# Patient Record
Sex: FEMALE | Race: WHITE | NOT HISPANIC OR LATINO | Employment: OTHER | URBAN - METROPOLITAN AREA
[De-identification: names, ages, dates, MRNs, and addresses within clinical notes are randomized per-mention and may not be internally consistent; named-entity substitution may affect disease eponyms.]

---

## 2017-10-17 ENCOUNTER — TRANSCRIBE ORDERS (OUTPATIENT)
Dept: ADMINISTRATIVE | Facility: HOSPITAL | Age: 62
End: 2017-10-17

## 2017-10-17 DIAGNOSIS — Z12.31 VISIT FOR SCREENING MAMMOGRAM: Primary | ICD-10-CM

## 2017-10-18 ENCOUNTER — HOSPITAL ENCOUNTER (OUTPATIENT)
Dept: MAMMOGRAPHY | Facility: HOSPITAL | Age: 62
Discharge: HOME | End: 2017-10-18
Attending: OBSTETRICS & GYNECOLOGY | Admitting: OBSTETRICS & GYNECOLOGY

## 2017-10-18 DIAGNOSIS — Z12.31 VISIT FOR SCREENING MAMMOGRAM: ICD-10-CM

## 2017-10-18 PROCEDURE — G0202 SCR MAMMO BI INCL CAD: HCPCS

## 2017-10-18 PROCEDURE — 77063 BREAST TOMOSYNTHESIS BI: CPT

## 2017-10-19 PROCEDURE — 77063 BREAST TOMOSYNTHESIS BI: CPT | Performed by: RADIOLOGY

## 2017-10-19 PROCEDURE — 77067 SCR MAMMO BI INCL CAD: CPT | Performed by: RADIOLOGY

## 2017-10-24 ENCOUNTER — HOSPITAL ENCOUNTER (OUTPATIENT)
Dept: MAMMOGRAPHY | Facility: HOSPITAL | Age: 62
Discharge: HOME | End: 2017-10-24

## 2017-10-24 DIAGNOSIS — Z12.31 VISIT FOR SCREENING MAMMOGRAM: ICD-10-CM

## 2017-10-24 PROCEDURE — G0202 SCR MAMMO BI INCL CAD: HCPCS

## 2018-11-15 ENCOUNTER — OFFICE VISIT (OUTPATIENT)
Dept: FAMILY MEDICINE CLINIC | Facility: CLINIC | Age: 63
End: 2018-11-15

## 2018-11-15 VITALS
DIASTOLIC BLOOD PRESSURE: 66 MMHG | WEIGHT: 217 LBS | HEIGHT: 64 IN | SYSTOLIC BLOOD PRESSURE: 138 MMHG | HEART RATE: 82 BPM | TEMPERATURE: 98.2 F | OXYGEN SATURATION: 99 % | BODY MASS INDEX: 37.05 KG/M2

## 2018-11-15 DIAGNOSIS — Z12.11 COLON CANCER SCREENING: ICD-10-CM

## 2018-11-15 DIAGNOSIS — Z00.00 GENERAL MEDICAL EXAM: Primary | ICD-10-CM

## 2018-11-15 DIAGNOSIS — F41.9 ANXIETY: ICD-10-CM

## 2018-11-15 DIAGNOSIS — E61.1 IRON DEFICIENCY: ICD-10-CM

## 2018-11-15 DIAGNOSIS — D50.9 IRON DEFICIENCY ANEMIA, UNSPECIFIED IRON DEFICIENCY ANEMIA TYPE: ICD-10-CM

## 2018-11-15 DIAGNOSIS — E53.8 B12 DEFICIENCY: ICD-10-CM

## 2018-11-15 DIAGNOSIS — J30.9 ALLERGIC RHINITIS, UNSPECIFIED SEASONALITY, UNSPECIFIED TRIGGER: ICD-10-CM

## 2018-11-15 DIAGNOSIS — I10 ESSENTIAL HYPERTENSION: ICD-10-CM

## 2018-11-15 DIAGNOSIS — E03.9 HYPOTHYROIDISM, UNSPECIFIED TYPE: ICD-10-CM

## 2018-11-15 PROCEDURE — 99386 PREV VISIT NEW AGE 40-64: CPT | Performed by: PHYSICIAN ASSISTANT

## 2018-11-15 RX ORDER — METOPROLOL SUCCINATE 50 MG/1
TABLET, EXTENDED RELEASE ORAL
COMMUNITY
End: 2018-11-15 | Stop reason: SDUPTHER

## 2018-11-15 RX ORDER — LORATADINE 10 MG/1
10 TABLET ORAL DAILY
Qty: 30 TABLET | Refills: 11 | Status: SHIPPED | OUTPATIENT
Start: 2018-11-15 | End: 2019-11-15 | Stop reason: SDUPTHER

## 2018-11-15 RX ORDER — LEVOTHYROXINE SODIUM 112 UG/1
112 TABLET ORAL DAILY
Qty: 30 TABLET | Refills: 11 | Status: SHIPPED | OUTPATIENT
Start: 2018-11-15 | End: 2019-12-02 | Stop reason: SDUPTHER

## 2018-11-15 RX ORDER — CYANOCOBALAMIN 1000 UG/ML
INJECTION, SOLUTION INTRAMUSCULAR; SUBCUTANEOUS
Qty: 1 ML | Refills: 11 | Status: SHIPPED | OUTPATIENT
Start: 2018-11-15 | End: 2020-02-18 | Stop reason: SDUPTHER

## 2018-11-15 RX ORDER — FLUOXETINE HYDROCHLORIDE 20 MG/1
CAPSULE ORAL
COMMUNITY
Start: 2018-11-07 | End: 2019-12-02

## 2018-11-15 RX ORDER — METOPROLOL SUCCINATE 50 MG/1
50 TABLET, EXTENDED RELEASE ORAL DAILY
Qty: 30 TABLET | Refills: 5 | Status: SHIPPED | OUTPATIENT
Start: 2018-11-15 | End: 2019-07-02 | Stop reason: SDUPTHER

## 2018-11-15 RX ORDER — ESTRADIOL 0.1 MG/D
FILM, EXTENDED RELEASE TRANSDERMAL
COMMUNITY
End: 2019-12-02 | Stop reason: SDUPTHER

## 2018-11-15 RX ORDER — LEVOTHYROXINE SODIUM 112 UG/1
TABLET ORAL
COMMUNITY
End: 2018-11-15 | Stop reason: SDUPTHER

## 2018-11-15 RX ORDER — LORATADINE 10 MG/1
TABLET ORAL
COMMUNITY
End: 2018-11-15 | Stop reason: SDUPTHER

## 2018-11-15 RX ORDER — ALPRAZOLAM 0.25 MG/1
0.25 TABLET ORAL NIGHTLY PRN
COMMUNITY
End: 2021-11-15 | Stop reason: SDUPTHER

## 2018-11-15 RX ORDER — FERROUS SULFATE 325(65) MG
TABLET ORAL EVERY 12 HOURS SCHEDULED
COMMUNITY
End: 2022-03-08

## 2018-11-15 RX ORDER — CHOLECALCIFEROL (VITAMIN D3) 125 MCG
CAPSULE ORAL
COMMUNITY
End: 2019-12-02 | Stop reason: SDUPTHER

## 2018-11-15 NOTE — PROGRESS NOTES
Subjective   Jeane Martines is a 63 y.o. female  Establish Care (New patient establish care, previous PCP Zara Steward ); Anemia (concerned about anemia, req referral for colonscopy/EGD ); and Annual Exam      History of Present Illness  Patient presents today to establish care as a new patient in our practice and for a preventive medical visit.  Patient is here to determine screening labs and tests that are due and to determine immunization status as well.  Patient will be counseled regarding preventative medicine issues such as regular exercise and  healthy diet as well.    The following portions of the patient's history were reviewed and updated as appropriate: allergies, current medications, past social history and problem list    Review of Systems   Constitutional: Positive for appetite change ( Craving ice lately). Negative for activity change, chills, diaphoresis, fatigue, fever and unexpected weight change.   HENT: Negative.    Eyes: Negative.    Respiratory: Negative.    Cardiovascular: Negative.    Gastrointestinal: Negative.  Negative for anal bleeding, blood in stool and constipation.   Endocrine: Negative.    Genitourinary: Negative.  Negative for hematuria.        Status post hysterectomy with Dr. Ndiaye   Musculoskeletal: Negative.    Skin: Negative.    Allergic/Immunologic: Negative.    Neurological: Negative.  Negative for dizziness.   Hematological: Negative.    Psychiatric/Behavioral: Negative.    All other systems reviewed and are negative.      Objective     Vitals:    11/15/18 1312   BP: 138/66   Pulse: 82   Temp: 98.2 °F (36.8 °C)   SpO2: 99%       Physical Exam   Constitutional: She is oriented to person, place, and time. She appears well-developed and well-nourished.  Non-toxic appearance. She does not have a sickly appearance. She does not appear ill. No distress.   Obesity noted  Patient appears healthy, pleasant, talkative, laughing, friendly   HENT:   Head: Normocephalic and  atraumatic.   Right Ear: External ear normal.   Left Ear: External ear normal.   Nose: Nose normal.   Mouth/Throat: Oropharynx is clear and moist.   Eyes: Conjunctivae and EOM are normal. Pupils are equal, round, and reactive to light. No scleral icterus.   Neck: Normal range of motion. Neck supple. No JVD present. Carotid bruit is not present. No thyromegaly present.   Cardiovascular: Normal rate, regular rhythm, normal heart sounds and intact distal pulses.   No murmur heard.  Pulmonary/Chest: Effort normal and breath sounds normal. No respiratory distress.   Abdominal: Soft. Bowel sounds are normal. She exhibits no mass. There is no tenderness.   Musculoskeletal: Normal range of motion. She exhibits no edema.   Lymphadenopathy:     She has no cervical adenopathy.   Neurological: She is alert and oriented to person, place, and time. She has normal reflexes. She displays normal reflexes. No cranial nerve deficit or sensory deficit. She exhibits normal muscle tone. Coordination normal.   Skin: Skin is warm and dry. No rash noted. She is not diaphoretic. No erythema. No pallor.   Psychiatric: She has a normal mood and affect. Her behavior is normal. Judgment and thought content normal.   Nursing note and vitals reviewed.  Discussed preventative medicine issues with patient including regular exercise, healthy diet, stress reduction, adequate sleep and recommended age-appropriate screening studies.    Assessment/Plan     Diagnoses and all orders for this visit:    General medical exam  -     Ambulatory Referral For Screening Colonoscopy    Allergic rhinitis, unspecified seasonality, unspecified trigger    Essential hypertension    Hypothyroidism, unspecified type    B12 deficiency    Iron deficiency  -     Ambulatory referral for Screening EGD  -     Ambulatory Referral For Screening Colonoscopy    Anxiety    Colon cancer screening  -     Ambulatory Referral For Screening Colonoscopy    Iron deficiency anemia,  "unspecified iron deficiency anemia type  -     Ambulatory referral for Screening EGD  -     Ambulatory Referral For Screening Colonoscopy    Other orders  -     Discontinue: levothyroxine (SYNTHROID, LEVOTHROID) 112 MCG tablet;  Take 1 tablet every day by oral route.  -     Discontinue: metoprolol succinate XL (TOPROL-XL) 50 MG 24 hr tablet; Take 1 tablet every day by oral route for 90 days  -     Discontinue: loratadine (CLARITIN) 10 MG tablet; Take 1 tablet every day by oral route for 90 days.  -     FLUoxetine (PROzac) 20 MG capsule; ONCE DAILY  -     ALPRAZolam (XANAX) 0.25 MG tablet;  Take 1 tablet every day by oral route as needed.  -     estradiol (MINIVELLE) 0.1 MG/24HR patch;  Apply 1 patch twice a week by transderm. route  -     ferrous sulfate 325 (65 FE) MG tablet; Every 12 (Twelve) Hours. Every 12 (Twelve) Hours.  -     Cholecalciferol (VITAMIN D3) 2000 units tablet; Take  by mouth.  -     metoprolol succinate XL (TOPROL-XL) 50 MG 24 hr tablet; Take 1 tablet by mouth Daily. Take 1 tablet every day by oral route for 90 days  -     levothyroxine (SYNTHROID, LEVOTHROID) 112 MCG tablet; Take 1 tablet by mouth Daily.  Take 1 tablet every day by oral route.  -     loratadine (CLARITIN) 10 MG tablet; Take 1 tablet by mouth Daily. Take 1 tablet every day by oral route for 90 days.  -     cyanocobalamin 1000 MCG/ML injection; Administer 1 ML IM monthly for pernicious anemia.  -     Needle, Disp, (B-D DISP NEEDLE 25GX1\") 25G X 1\" misc; 1 mL Every 30 (Thirty) Days.      "

## 2018-12-03 ENCOUNTER — TELEPHONE (OUTPATIENT)
Dept: FAMILY MEDICINE CLINIC | Facility: CLINIC | Age: 63
End: 2018-12-03

## 2018-12-03 DIAGNOSIS — D64.9 ANEMIA, UNSPECIFIED TYPE: Primary | ICD-10-CM

## 2018-12-03 NOTE — TELEPHONE ENCOUNTER
Nona, can you please confirm/make sure she has been scheduled for EGD and colonoscopy for evaluation of iron deficient anemia.  I was awaiting her lab results from her previous doctor she does have a low iron count of 18 and a hemoglobin of 8.4.

## 2018-12-03 NOTE — TELEPHONE ENCOUNTER
Please call patient as her EGD showed some gastritis this may be the cause of her anemia but I want her to repeat her labs within the next week so that I can compare them to the labs that we received from her previous doctor to determine whether further studies are needed.

## 2018-12-11 ENCOUNTER — LAB (OUTPATIENT)
Dept: LAB | Facility: HOSPITAL | Age: 63
End: 2018-12-11

## 2018-12-11 DIAGNOSIS — D64.9 ANEMIA, UNSPECIFIED TYPE: ICD-10-CM

## 2018-12-11 LAB
DEPRECATED RDW RBC AUTO: 60.1 FL (ref 37–54)
ERYTHROCYTE [DISTWIDTH] IN BLOOD BY AUTOMATED COUNT: 21.1 % (ref 11.3–14.5)
FERRITIN SERPL-MCNC: 18 NG/ML (ref 10–291)
HCT VFR BLD AUTO: 40.7 % (ref 34.5–44)
HGB BLD-MCNC: 12.7 G/DL (ref 11.5–15.5)
IRON 24H UR-MRATE: 242 MCG/DL (ref 50–175)
MCH RBC QN AUTO: 24.4 PG (ref 27–31)
MCHC RBC AUTO-ENTMCNC: 31.2 G/DL (ref 32–36)
MCV RBC AUTO: 78.3 FL (ref 80–99)
PLATELET # BLD AUTO: 354 10*3/MM3 (ref 150–450)
PMV BLD AUTO: 10.8 FL (ref 6–12)
RBC # BLD AUTO: 5.2 10*6/MM3 (ref 3.89–5.14)
VIT B12 BLD-MCNC: 309 PG/ML (ref 211–911)
WBC NRBC COR # BLD: 8.11 10*3/MM3 (ref 3.5–10.8)

## 2018-12-11 PROCEDURE — 83540 ASSAY OF IRON: CPT

## 2018-12-11 PROCEDURE — 36415 COLL VENOUS BLD VENIPUNCTURE: CPT

## 2018-12-11 PROCEDURE — 82607 VITAMIN B-12: CPT

## 2018-12-11 PROCEDURE — 85027 COMPLETE CBC AUTOMATED: CPT

## 2018-12-11 PROCEDURE — 82728 ASSAY OF FERRITIN: CPT

## 2019-01-15 ENCOUNTER — TELEPHONE (OUTPATIENT)
Dept: FAMILY MEDICINE CLINIC | Facility: CLINIC | Age: 64
End: 2019-01-15

## 2019-01-15 NOTE — TELEPHONE ENCOUNTER
----- Message from Gail Floyd sent at 1/15/2019 11:16 AM EST -----  Contact: PATIENT  NEEDS A REFILL CALLED IN ON:    (CLARITIN) 10 MG tablet 30 tablet    Sig - Route: Take 1 tablet by mouth Daily. Take 1 tablet every day by oral route for 90 days. - Oral     [facility]     [place] [address] [facility] [phone]  - [phone]

## 2019-04-23 ENCOUNTER — CLINICAL SUPPORT (OUTPATIENT)
Dept: FAMILY MEDICINE CLINIC | Facility: CLINIC | Age: 64
End: 2019-04-23

## 2019-04-23 DIAGNOSIS — Z23 IMMUNIZATION DUE: Primary | ICD-10-CM

## 2019-07-01 ENCOUNTER — TELEPHONE (OUTPATIENT)
Dept: FAMILY MEDICINE CLINIC | Facility: CLINIC | Age: 64
End: 2019-07-01

## 2019-07-01 NOTE — TELEPHONE ENCOUNTER
----- Message from Mary Mota sent at 7/1/2019 11:21 AM EDT -----  Contact: PT.  PT. SEE'S MICKEY.  PT. IS NEEDING A REFILL ON:  metoprolol succinate XL (TOPROL-XL) 50 MG 24 hr tablet 30 tablet 5 11/15/2018    Sig - Route: Take 1 tablet by mouth Daily. Take 1 tablet every day by oral route for 90 days - Oral   Sent to pharmacy as: Metoprolol Succinate ER 50 MG Oral Tablet Extended Release 24 Hour   Notes to Pharmacy: Keep on file for pt     RX=[facility] [place] [address] [place] AT [facility] - [phone]  - [phone] (Phone)  [phone] (Fax)    PT. CAN BE REACHED @ [facility] #.

## 2019-07-01 NOTE — TELEPHONE ENCOUNTER
Patient is due for follow-up in the office for recheck of blood pressure, please call in a 1 month supply of medication and notify her that she is due to come back in before further refills.

## 2019-07-02 RX ORDER — METOPROLOL SUCCINATE 50 MG/1
50 TABLET, EXTENDED RELEASE ORAL DAILY
Qty: 30 TABLET | Refills: 5 | Status: SHIPPED | OUTPATIENT
Start: 2019-07-02 | End: 2019-12-02 | Stop reason: SDUPTHER

## 2019-07-10 ENCOUNTER — APPOINTMENT (OUTPATIENT)
Dept: LAB | Facility: HOSPITAL | Age: 64
End: 2019-07-10

## 2019-07-10 ENCOUNTER — OFFICE VISIT (OUTPATIENT)
Dept: FAMILY MEDICINE CLINIC | Facility: CLINIC | Age: 64
End: 2019-07-10

## 2019-07-10 VITALS
TEMPERATURE: 98.4 F | HEART RATE: 68 BPM | WEIGHT: 224 LBS | OXYGEN SATURATION: 99 % | DIASTOLIC BLOOD PRESSURE: 70 MMHG | BODY MASS INDEX: 38.24 KG/M2 | HEIGHT: 64 IN | SYSTOLIC BLOOD PRESSURE: 122 MMHG

## 2019-07-10 DIAGNOSIS — I10 ESSENTIAL HYPERTENSION: ICD-10-CM

## 2019-07-10 DIAGNOSIS — D64.9 ANEMIA, UNSPECIFIED TYPE: ICD-10-CM

## 2019-07-10 DIAGNOSIS — E03.9 ACQUIRED HYPOTHYROIDISM: Primary | ICD-10-CM

## 2019-07-10 LAB
DEPRECATED RDW RBC AUTO: 45.5 FL (ref 37–54)
ERYTHROCYTE [DISTWIDTH] IN BLOOD BY AUTOMATED COUNT: 14.7 % (ref 12.3–15.4)
HCT VFR BLD AUTO: 40 % (ref 34–46.6)
HGB BLD-MCNC: 12.6 G/DL (ref 12–15.9)
IRON 24H UR-MRATE: 48 MCG/DL (ref 37–145)
MCH RBC QN AUTO: 26.9 PG (ref 26.6–33)
MCHC RBC AUTO-ENTMCNC: 31.5 G/DL (ref 31.5–35.7)
MCV RBC AUTO: 85.3 FL (ref 79–97)
PLATELET # BLD AUTO: 335 10*3/MM3 (ref 140–450)
PMV BLD AUTO: 11.3 FL (ref 6–12)
RBC # BLD AUTO: 4.69 10*6/MM3 (ref 3.77–5.28)
TSH SERPL DL<=0.05 MIU/L-ACNC: 0.53 MIU/ML (ref 0.27–4.2)
WBC NRBC COR # BLD: 6.39 10*3/MM3 (ref 3.4–10.8)

## 2019-07-10 PROCEDURE — 84443 ASSAY THYROID STIM HORMONE: CPT | Performed by: PHYSICIAN ASSISTANT

## 2019-07-10 PROCEDURE — 85027 COMPLETE CBC AUTOMATED: CPT | Performed by: PHYSICIAN ASSISTANT

## 2019-07-10 PROCEDURE — 83540 ASSAY OF IRON: CPT | Performed by: PHYSICIAN ASSISTANT

## 2019-07-10 PROCEDURE — 99213 OFFICE O/P EST LOW 20 MIN: CPT | Performed by: PHYSICIAN ASSISTANT

## 2019-07-10 PROCEDURE — 36415 COLL VENOUS BLD VENIPUNCTURE: CPT | Performed by: PHYSICIAN ASSISTANT

## 2019-07-10 NOTE — PROGRESS NOTES
Subjective   Jeane Martines is a 63 y.o. female  Hypertension (Follow up on blood pressure, doing well on medication )      History of Present Illness  Patient comes in today for follow-up on hypertension and history of anemia.  She is doing well states she is feeling much better, she had a colonoscopy and EGD in November showed some mild gastritis and took a PPI for 3 months.  She states her energy is good now.  She is also due for TSH to be rechecked on her hypothyroidism.  The following portions of the patient's history were reviewed and updated as appropriate: allergies, current medications, past social history and problem list    Review of Systems   Constitutional: Negative for fatigue and unexpected weight change.   Eyes: Negative for visual disturbance.   Respiratory: Negative for cough, chest tightness and shortness of breath.    Cardiovascular: Negative for chest pain, palpitations and leg swelling.   Gastrointestinal: Negative for constipation, diarrhea and nausea.   Endocrine: Negative for cold intolerance and heat intolerance.   Musculoskeletal: Negative for neck pain.   Skin: Negative for color change and rash.   Neurological: Negative for dizziness, tremors, syncope, weakness and headaches.   Psychiatric/Behavioral: Negative for agitation. The patient is not nervous/anxious.        Objective     Vitals:    07/10/19 1518   BP: 122/70   Pulse: 68   Temp: 98.4 °F (36.9 °C)   SpO2: 99%       Physical Exam   Constitutional: She appears well-developed and well-nourished. No distress.   HENT:   Head: Normocephalic.   No Exopthalmos   Neck: No JVD present. No thyromegaly present.   Cardiovascular: Normal rate, regular rhythm, normal heart sounds and intact distal pulses.   No murmur heard.  Pulmonary/Chest: Effort normal and breath sounds normal. No respiratory distress. She exhibits no tenderness.   Abdominal: Soft. She exhibits no distension. There is no tenderness.   Musculoskeletal: She exhibits no  edema.   Lymphadenopathy:     She has no cervical adenopathy.   Skin: Skin is warm and dry. She is not diaphoretic. No erythema. No pallor.   Psychiatric: She has a normal mood and affect.   Nursing note and vitals reviewed.      Assessment/Plan     Diagnoses and all orders for this visit:    Acquired hypothyroidism  -     TSH    Anemia, unspecified type  -     CBC (No Diff)  -     Iron    Essential hypertension    Continue current medications follow-up in 6 months for recheck and for physical at that time.

## 2019-09-23 ENCOUNTER — TELEPHONE (OUTPATIENT)
Dept: FAMILY MEDICINE CLINIC | Facility: CLINIC | Age: 64
End: 2019-09-23

## 2019-09-23 RX ORDER — AMOXICILLIN 875 MG/1
875 TABLET, COATED ORAL 2 TIMES DAILY
Qty: 20 TABLET | Refills: 0 | Status: SHIPPED | OUTPATIENT
Start: 2019-09-23 | End: 2019-12-02

## 2019-09-23 NOTE — TELEPHONE ENCOUNTER
----- Message from Gail Floyd sent at 9/23/2019 10:28 AM EDT -----  Contact: PATIENT  SHE HAS A SINUS INFECTION, NASAL PRESSURE AND STUFFINESS, COUGH X 2 DAYS. HAS BEEN TAKING ROBITUSSIN AND NO RELIEF. WANTED TO KNOW IF SOMETHING CAN BE CALLED IN FOR HER. LAST OFFICE VISIT 7/10/19    NO KNOWN ALLERGIES    [facility] [address] - [phone]  - [phone]  [phone] (Phone)  [phone] (Fax)

## 2019-11-15 RX ORDER — LORATADINE 10 MG/1
10 TABLET ORAL DAILY
Qty: 30 TABLET | Refills: 11 | Status: SHIPPED | OUTPATIENT
Start: 2019-11-15 | End: 2019-12-02 | Stop reason: SDUPTHER

## 2019-11-15 RX ORDER — LORATADINE 10 MG/1
TABLET ORAL
Qty: 30 TABLET | Refills: 10 | OUTPATIENT
Start: 2019-11-15

## 2019-12-02 ENCOUNTER — OFFICE VISIT (OUTPATIENT)
Dept: FAMILY MEDICINE CLINIC | Facility: CLINIC | Age: 64
End: 2019-12-02

## 2019-12-02 VITALS
OXYGEN SATURATION: 99 % | TEMPERATURE: 98.4 F | SYSTOLIC BLOOD PRESSURE: 142 MMHG | BODY MASS INDEX: 38.14 KG/M2 | DIASTOLIC BLOOD PRESSURE: 82 MMHG | WEIGHT: 223.4 LBS | HEIGHT: 64 IN | HEART RATE: 72 BPM

## 2019-12-02 DIAGNOSIS — F41.9 ANXIETY: ICD-10-CM

## 2019-12-02 DIAGNOSIS — G47.00 INSOMNIA, UNSPECIFIED TYPE: ICD-10-CM

## 2019-12-02 DIAGNOSIS — E53.8 B12 DEFICIENCY: ICD-10-CM

## 2019-12-02 DIAGNOSIS — N95.1 MENOPAUSAL AND FEMALE CLIMACTERIC STATES: ICD-10-CM

## 2019-12-02 DIAGNOSIS — Z12.39 BREAST CANCER SCREENING: ICD-10-CM

## 2019-12-02 DIAGNOSIS — E03.9 HYPOTHYROIDISM, UNSPECIFIED TYPE: ICD-10-CM

## 2019-12-02 DIAGNOSIS — Z23 IMMUNIZATION DUE: ICD-10-CM

## 2019-12-02 DIAGNOSIS — Z00.00 GENERAL MEDICAL EXAM: Primary | ICD-10-CM

## 2019-12-02 PROCEDURE — 99396 PREV VISIT EST AGE 40-64: CPT | Performed by: PHYSICIAN ASSISTANT

## 2019-12-02 RX ORDER — METOPROLOL SUCCINATE 50 MG/1
50 TABLET, EXTENDED RELEASE ORAL DAILY
Qty: 30 TABLET | Refills: 11 | Status: SHIPPED | OUTPATIENT
Start: 2019-12-02 | End: 2020-12-02 | Stop reason: SDUPTHER

## 2019-12-02 RX ORDER — LEVOTHYROXINE SODIUM 112 UG/1
112 TABLET ORAL DAILY
Qty: 30 TABLET | Refills: 11 | Status: SHIPPED | OUTPATIENT
Start: 2019-12-02 | End: 2020-12-02 | Stop reason: SDUPTHER

## 2019-12-02 RX ORDER — LORATADINE 10 MG/1
10 TABLET ORAL DAILY
Qty: 30 TABLET | Refills: 11 | Status: SHIPPED | OUTPATIENT
Start: 2019-12-02 | End: 2020-12-02 | Stop reason: SDUPTHER

## 2019-12-02 RX ORDER — CHOLECALCIFEROL (VITAMIN D3) 125 MCG
1 CAPSULE ORAL DAILY
Qty: 30 TABLET | Refills: 11 | Status: SHIPPED | OUTPATIENT
Start: 2019-12-02

## 2019-12-02 RX ORDER — ESTRADIOL 0.1 MG/D
1 FILM, EXTENDED RELEASE TRANSDERMAL 2 TIMES WEEKLY
Qty: 8 PATCH | Refills: 11 | Status: SHIPPED | OUTPATIENT
Start: 2019-12-02 | End: 2020-12-02 | Stop reason: SDUPTHER

## 2019-12-02 NOTE — PROGRESS NOTES
Subjective   Jeane Martines is a 64 y.o. female  Med Refill (Req refills on all medications, Metoprolol, Levothyroxine, Estradiol patch. Iron Supplement,Vitamin b12); Anxiety (req refills on alprazolam for intermittent anxiety and insomnia ); and Annual Exam      History of Present Illness  Patient presents today for a preventive medical visit.  Patient is here to determine screening labs and tests that are due and to determine immunization status as well.  Patient will be counseled regarding preventative medicine issues such as regular exercise and  healthy diet as well.  The following portions of the patient's history were reviewed and updated as appropriate: allergies, current medications, past social history and problem list    Review of Systems   Constitutional: Negative.    HENT: Negative.    Eyes: Negative.    Respiratory: Negative.    Cardiovascular: Negative.    Gastrointestinal: Negative.    Endocrine: Negative.    Genitourinary: Negative.    Musculoskeletal: Negative.    Skin: Negative.    Allergic/Immunologic: Negative.    Neurological: Negative.    Hematological: Negative.    Psychiatric/Behavioral: The patient is nervous/anxious ( +stable on medication).    All other systems reviewed and are negative.      Objective     Vitals:    12/02/19 1039   BP: 142/82   Pulse: 72   Temp: 98.4 °F (36.9 °C)   SpO2: 99%       Physical Exam   Constitutional: She is oriented to person, place, and time. She appears well-developed and well-nourished. No distress.   HENT:   Head: Normocephalic and atraumatic.   Right Ear: External ear normal.   Left Ear: External ear normal.   Nose: Nose normal.   Mouth/Throat: Oropharynx is clear and moist.   Eyes: Conjunctivae and EOM are normal. Pupils are equal, round, and reactive to light.   Neck: Normal range of motion. Neck supple. No JVD present. Carotid bruit is not present. No thyromegaly present.   Cardiovascular: Normal rate, regular rhythm, normal heart sounds and intact  distal pulses.   No murmur heard.  Pulmonary/Chest: Effort normal and breath sounds normal. No respiratory distress.   Abdominal: Soft. Bowel sounds are normal. She exhibits no mass. There is no tenderness.   Musculoskeletal: Normal range of motion. She exhibits no edema.   Lymphadenopathy:     She has no cervical adenopathy.   Neurological: She is alert and oriented to person, place, and time. She has normal reflexes. No cranial nerve deficit. Coordination normal.   Skin: Skin is warm and dry. She is not diaphoretic.   Psychiatric: She has a normal mood and affect. Her behavior is normal. Judgment and thought content normal.   Nursing note and vitals reviewed.    Discussed preventative medicine issues with patient including regular exercise, healthy diet, stress reduction, adequate sleep and recommended age-appropriate screening studies.  Assessment/Plan     Diagnoses and all orders for this visit:    General medical exam  -     Lipid Panel; Future  -     Cancel: TSH; Future  -     Basic Metabolic Panel; Future  -     CBC & Differential    Immunization due  -     influenza vac split quad (FLUZONE,FLUARIX,AFLURIA) injection 0.5 mL    Hypothyroidism, unspecified type  -     Cancel: TSH; Future    Anxiety    B12 deficiency    Insomnia, unspecified type    Menopausal and female climacteric states    Breast cancer screening  -     Mammo Screening Bilateral With CAD; Future    Other orders  -     metoprolol succinate XL (TOPROL-XL) 50 MG 24 hr tablet; Take 1 tablet by mouth Daily.  -     loratadine (CLARITIN) 10 MG tablet; Take 1 tablet by mouth Daily. Take 1 tablet every day by oral route for 90 days.  -     levothyroxine (SYNTHROID, LEVOTHROID) 112 MCG tablet; Take 1 tablet by mouth Daily.  Take 1 tablet every day by oral route.  -     estradiol (MINIVELLE) 0.1 MG/24HR patch; Place 1 patch on the skin as directed by provider 2 (Two) Times a Week.  Apply 1 patch twice a week by transderm. route  -     Cholecalciferol  (VITAMIN D3) 50 MCG (2000 UT) tablet; Take 1 tablet by mouth Daily.    + Xanax 0.25 mg 1 nightly/daily as needed for anxiety #30 with 5 refills discussed abuse potential medication Terrence reviewed, appropriate, follow-up in office in 6 months and as needed.

## 2019-12-09 ENCOUNTER — TRANSCRIBE ORDERS (OUTPATIENT)
Dept: ADMINISTRATIVE | Facility: HOSPITAL | Age: 64
End: 2019-12-09

## 2019-12-09 DIAGNOSIS — Z12.31 VISIT FOR SCREENING MAMMOGRAM: Primary | ICD-10-CM

## 2020-01-06 ENCOUNTER — LAB (OUTPATIENT)
Dept: LAB | Facility: HOSPITAL | Age: 65
End: 2020-01-06

## 2020-01-06 DIAGNOSIS — Z00.00 GENERAL MEDICAL EXAM: ICD-10-CM

## 2020-01-06 LAB
ANION GAP SERPL CALCULATED.3IONS-SCNC: 11.3 MMOL/L (ref 5–15)
BASOPHILS # BLD AUTO: 0.06 10*3/MM3 (ref 0–0.2)
BASOPHILS NFR BLD AUTO: 0.8 % (ref 0–1.5)
BUN BLD-MCNC: 7 MG/DL (ref 8–23)
BUN/CREAT SERPL: 9.3 (ref 7–25)
CALCIUM SPEC-SCNC: 9 MG/DL (ref 8.6–10.5)
CHLORIDE SERPL-SCNC: 104 MMOL/L (ref 98–107)
CHOLEST SERPL-MCNC: 125 MG/DL (ref 0–200)
CO2 SERPL-SCNC: 24.7 MMOL/L (ref 22–29)
CREAT BLD-MCNC: 0.75 MG/DL (ref 0.57–1)
DEPRECATED RDW RBC AUTO: 47.9 FL (ref 37–54)
EOSINOPHIL # BLD AUTO: 0.22 10*3/MM3 (ref 0–0.4)
EOSINOPHIL NFR BLD AUTO: 3.1 % (ref 0.3–6.2)
ERYTHROCYTE [DISTWIDTH] IN BLOOD BY AUTOMATED COUNT: 17.6 % (ref 12.3–15.4)
GFR SERPL CREATININE-BSD FRML MDRD: 78 ML/MIN/1.73
GLUCOSE BLD-MCNC: 95 MG/DL (ref 65–99)
HCT VFR BLD AUTO: 35.8 % (ref 34–46.6)
HDLC SERPL-MCNC: 35 MG/DL (ref 40–60)
HGB BLD-MCNC: 11.4 G/DL (ref 12–15.9)
IMM GRANULOCYTES # BLD AUTO: 0.05 10*3/MM3 (ref 0–0.05)
IMM GRANULOCYTES NFR BLD AUTO: 0.7 % (ref 0–0.5)
LDLC SERPL CALC-MCNC: 66 MG/DL (ref 0–100)
LDLC/HDLC SERPL: 1.88 {RATIO}
LYMPHOCYTES # BLD AUTO: 1.98 10*3/MM3 (ref 0.7–3.1)
LYMPHOCYTES NFR BLD AUTO: 27.9 % (ref 19.6–45.3)
MCH RBC QN AUTO: 24.4 PG (ref 26.6–33)
MCHC RBC AUTO-ENTMCNC: 31.8 G/DL (ref 31.5–35.7)
MCV RBC AUTO: 76.5 FL (ref 79–97)
MONOCYTES # BLD AUTO: 0.6 10*3/MM3 (ref 0.1–0.9)
MONOCYTES NFR BLD AUTO: 8.5 % (ref 5–12)
NEUTROPHILS # BLD AUTO: 4.19 10*3/MM3 (ref 1.7–7)
NEUTROPHILS NFR BLD AUTO: 59 % (ref 42.7–76)
NRBC BLD AUTO-RTO: 0 /100 WBC (ref 0–0.2)
PLATELET # BLD AUTO: 324 10*3/MM3 (ref 140–450)
PMV BLD AUTO: 11 FL (ref 6–12)
POTASSIUM BLD-SCNC: 4.3 MMOL/L (ref 3.5–5.2)
RBC # BLD AUTO: 4.68 10*6/MM3 (ref 3.77–5.28)
SODIUM BLD-SCNC: 140 MMOL/L (ref 136–145)
TRIGL SERPL-MCNC: 121 MG/DL (ref 0–150)
VLDLC SERPL-MCNC: 24.2 MG/DL (ref 5–40)
WBC NRBC COR # BLD: 7.1 10*3/MM3 (ref 3.4–10.8)

## 2020-01-06 PROCEDURE — 80048 BASIC METABOLIC PNL TOTAL CA: CPT

## 2020-01-06 PROCEDURE — 80061 LIPID PANEL: CPT

## 2020-01-06 PROCEDURE — 85025 COMPLETE CBC W/AUTO DIFF WBC: CPT | Performed by: PHYSICIAN ASSISTANT

## 2020-01-06 PROCEDURE — 36415 COLL VENOUS BLD VENIPUNCTURE: CPT

## 2020-02-14 ENCOUNTER — APPOINTMENT (OUTPATIENT)
Dept: MAMMOGRAPHY | Facility: HOSPITAL | Age: 65
End: 2020-02-14

## 2020-02-21 RX ORDER — CYANOCOBALAMIN 1000 UG/ML
INJECTION, SOLUTION INTRAMUSCULAR; SUBCUTANEOUS
Qty: 1 ML | Refills: 11 | Status: SHIPPED | OUTPATIENT
Start: 2020-02-21 | End: 2021-02-24

## 2020-03-10 ENCOUNTER — APPOINTMENT (OUTPATIENT)
Dept: MAMMOGRAPHY | Facility: HOSPITAL | Age: 65
End: 2020-03-10

## 2020-06-02 ENCOUNTER — OFFICE VISIT (OUTPATIENT)
Dept: FAMILY MEDICINE CLINIC | Facility: CLINIC | Age: 65
End: 2020-06-02

## 2020-06-02 VITALS
WEIGHT: 223 LBS | HEIGHT: 64 IN | TEMPERATURE: 98.2 F | SYSTOLIC BLOOD PRESSURE: 121 MMHG | HEART RATE: 76 BPM | OXYGEN SATURATION: 99 % | BODY MASS INDEX: 38.07 KG/M2 | DIASTOLIC BLOOD PRESSURE: 70 MMHG

## 2020-06-02 DIAGNOSIS — Z87.19 HISTORY OF GASTRITIS: ICD-10-CM

## 2020-06-02 DIAGNOSIS — F41.9 ANXIETY: Primary | ICD-10-CM

## 2020-06-02 DIAGNOSIS — G47.00 INSOMNIA, UNSPECIFIED TYPE: ICD-10-CM

## 2020-06-02 DIAGNOSIS — D50.9 IRON DEFICIENCY ANEMIA, UNSPECIFIED IRON DEFICIENCY ANEMIA TYPE: ICD-10-CM

## 2020-06-02 PROCEDURE — 99214 OFFICE O/P EST MOD 30 MIN: CPT | Performed by: PHYSICIAN ASSISTANT

## 2020-06-02 RX ORDER — FLUOXETINE HYDROCHLORIDE 20 MG/1
20 CAPSULE ORAL DAILY
Qty: 30 CAPSULE | Refills: 11 | Status: SHIPPED | OUTPATIENT
Start: 2020-06-02 | End: 2020-06-19 | Stop reason: SINTOL

## 2020-06-02 NOTE — PROGRESS NOTES
Subjective   Jeane Martines is a 64 y.o. female  Insomnia (Follow up on insomnia, refill on Alprazolam) and Anxiety (increased anxiety x2 months, wants to re-try Prozac)      History of Present Illness  Patient is a pleasant 64-year-old white female who presents today for follow-up on chronic insomnia and anxiety.  She is also following up on iron for anemia which is chronic as well.  Patient states her anxiety is been worse that she discontinued her fluoxetine about 6 months ago.  She was nauseous no significant problems from it she is going to see if she come off some of her medications.  She said she did experience some increased indigestion when she took medication was the only thing she noticed.  She does not stress any current GI symptoms she is on Prilosec daily.  She is tried to take her iron supplement daily but states he does not always remember to take it.  Her insomnia stable on Xanax nightly.  She denies any side effects with medication.  She is able to rest well, full sleep and states that both his medication without any daytime grogginess.  The following portions of the patient's history were reviewed and updated as appropriate: allergies, current medications, past social history and problem list    Review of Systems   Constitutional: Negative for appetite change and fatigue.   Respiratory: Negative.  Negative for chest tightness and shortness of breath.    Cardiovascular: Negative.    Gastrointestinal: Negative for abdominal pain, anal bleeding, blood in stool, diarrhea and nausea.   Genitourinary: Negative for hematuria.   Neurological: Negative for dizziness, tremors, weakness, light-headedness and headaches.   Psychiatric/Behavioral: Positive for sleep disturbance ( Stable on medication). Negative for agitation, behavioral problems, confusion, decreased concentration, dysphoric mood and suicidal ideas. The patient is nervous/anxious.        Objective     Vitals:    06/02/20 1313   BP: 121/70    Pulse: 76   Temp: 98.2 °F (36.8 °C)   SpO2: 99%       Physical Exam   Constitutional: She is oriented to person, place, and time. She appears well-developed and well-nourished. No distress.   Neck: No JVD present.   Cardiovascular: Normal rate, regular rhythm, normal heart sounds and intact distal pulses.   No murmur heard.  Pulmonary/Chest: Effort normal and breath sounds normal. No respiratory distress. She exhibits no tenderness.   Abdominal: Soft. She exhibits no distension. There is no tenderness.   Musculoskeletal: She exhibits no edema.   Neurological: She is alert and oriented to person, place, and time. No cranial nerve deficit.   Skin: Skin is warm and dry. She is not diaphoretic. No erythema. No pallor.   Psychiatric: She has a normal mood and affect. Her speech is normal and behavior is normal. Judgment and thought content normal. Cognition and memory are normal. She is attentive.   Nursing note and vitals reviewed.      Assessment/Plan     Jeane was seen today for insomnia and anxiety.    Diagnoses and all orders for this visit:    Anxiety    Insomnia, unspecified type    Iron deficiency anemia, unspecified iron deficiency anemia type  -     CBC & Differential; Future  -     Iron; Future    History of gastritis    Other orders  -     FLUoxetine (PROzac) 20 MG capsule; Take 1 capsule by mouth Daily.    Refill Xanax 0.25 mg 1 nightly for anxiety/insomnia #30 with 5 refills, discussed abuse potential controlled substance as of this medication.  Follow-up in 6 months and as needed.  I will send letter on labs after I review them.

## 2020-06-02 NOTE — PROGRESS NOTES
I have reviewed the notes, assessments, and/or procedures performed by Vijaya Figueroa PA-C, I concur with her documentation of Jeane Martines.

## 2020-06-19 ENCOUNTER — TELEPHONE (OUTPATIENT)
Dept: FAMILY MEDICINE CLINIC | Facility: CLINIC | Age: 65
End: 2020-06-19

## 2020-06-19 RX ORDER — CITALOPRAM 20 MG/1
20 TABLET ORAL DAILY
Qty: 30 TABLET | Refills: 11 | Status: SHIPPED | OUTPATIENT
Start: 2020-06-19 | End: 2021-04-28 | Stop reason: SDUPTHER

## 2020-06-19 NOTE — TELEPHONE ENCOUNTER
----- Message from Jeane Martines sent at 6/19/2020  9:40 AM EDT -----  Regarding: Prescription Question  Contact: [phone]  I am having some side effects from my fluoxetine if I could have it changed Like we discussed I would appreciate it. I am having heartburn and diarrhea. If you have any questions you can call me at [phone] thank you

## 2020-06-19 NOTE — TELEPHONE ENCOUNTER
NEW MEDICATION SENT TO PHARM, [facility]/C [facility]

## 2020-06-19 NOTE — TELEPHONE ENCOUNTER
Please have patient discontinue fluoxetine and change to Celexa 20 mg 1 daily #30 with 11 refills.  Have her follow-up with me for recheck if her GI symptoms do not resolve with this medication change and if her anxiety symptoms do not resolve with medication change after the next 2 weeks.  Please have the pharmacy discontinue her fluoxetine    ----- Message from Jeane Martines sent at 6/19/2020  9:40 AM EDT -----  Regarding: Prescription Question  Contact: [phone]  I am having some side effects from my fluoxetine if I could have it changed Like we discussed I would appreciate it. I am having heartburn and diarrhea. If you have any questions you can call me at [phone] thank you

## 2020-06-30 ENCOUNTER — LAB (OUTPATIENT)
Dept: LAB | Facility: HOSPITAL | Age: 65
End: 2020-06-30

## 2020-06-30 DIAGNOSIS — D50.9 IRON DEFICIENCY ANEMIA, UNSPECIFIED IRON DEFICIENCY ANEMIA TYPE: ICD-10-CM

## 2020-06-30 LAB
BASOPHILS # BLD AUTO: 0.06 10*3/MM3 (ref 0–0.2)
BASOPHILS NFR BLD AUTO: 0.9 % (ref 0–1.5)
DEPRECATED RDW RBC AUTO: 49.5 FL (ref 37–54)
EOSINOPHIL # BLD AUTO: 0.12 10*3/MM3 (ref 0–0.4)
EOSINOPHIL NFR BLD AUTO: 1.8 % (ref 0.3–6.2)
ERYTHROCYTE [DISTWIDTH] IN BLOOD BY AUTOMATED COUNT: 17.2 % (ref 12.3–15.4)
HCT VFR BLD AUTO: 38 % (ref 34–46.6)
HGB BLD-MCNC: 12.2 G/DL (ref 12–15.9)
IMM GRANULOCYTES # BLD AUTO: 0.02 10*3/MM3 (ref 0–0.05)
IMM GRANULOCYTES NFR BLD AUTO: 0.3 % (ref 0–0.5)
LYMPHOCYTES # BLD AUTO: 1.9 10*3/MM3 (ref 0.7–3.1)
LYMPHOCYTES NFR BLD AUTO: 28.6 % (ref 19.6–45.3)
MCH RBC QN AUTO: 25.4 PG (ref 26.6–33)
MCHC RBC AUTO-ENTMCNC: 32.1 G/DL (ref 31.5–35.7)
MCV RBC AUTO: 79.2 FL (ref 79–97)
MONOCYTES # BLD AUTO: 0.6 10*3/MM3 (ref 0.1–0.9)
MONOCYTES NFR BLD AUTO: 9 % (ref 5–12)
NEUTROPHILS NFR BLD AUTO: 3.95 10*3/MM3 (ref 1.7–7)
NEUTROPHILS NFR BLD AUTO: 59.4 % (ref 42.7–76)
NRBC BLD AUTO-RTO: 0 /100 WBC (ref 0–0.2)
PLATELET # BLD AUTO: 347 10*3/MM3 (ref 140–450)
PMV BLD AUTO: 11.4 FL (ref 6–12)
RBC # BLD AUTO: 4.8 10*6/MM3 (ref 3.77–5.28)
WBC # BLD AUTO: 6.65 10*3/MM3 (ref 3.4–10.8)

## 2020-06-30 PROCEDURE — 85025 COMPLETE CBC W/AUTO DIFF WBC: CPT

## 2020-06-30 PROCEDURE — 83540 ASSAY OF IRON: CPT

## 2020-06-30 PROCEDURE — 36415 COLL VENOUS BLD VENIPUNCTURE: CPT

## 2020-07-01 LAB — IRON 24H UR-MRATE: 39 MCG/DL (ref 37–145)

## 2020-10-21 ENCOUNTER — TRANSCRIBE ORDERS (OUTPATIENT)
Dept: ADMINISTRATIVE | Facility: HOSPITAL | Age: 65
End: 2020-10-21

## 2020-10-21 DIAGNOSIS — Z12.31 VISIT FOR SCREENING MAMMOGRAM: Primary | ICD-10-CM

## 2020-11-06 ENCOUNTER — CLINICAL SUPPORT (OUTPATIENT)
Dept: FAMILY MEDICINE CLINIC | Facility: CLINIC | Age: 65
End: 2020-11-06

## 2020-11-06 DIAGNOSIS — Z23 IMMUNIZATION DUE: Primary | ICD-10-CM

## 2020-11-06 PROCEDURE — 90471 IMMUNIZATION ADMIN: CPT | Performed by: FAMILY MEDICINE

## 2020-11-06 PROCEDURE — 90686 IIV4 VACC NO PRSV 0.5 ML IM: CPT | Performed by: FAMILY MEDICINE

## 2020-11-11 ENCOUNTER — HOSPITAL ENCOUNTER (OUTPATIENT)
Dept: MAMMOGRAPHY | Facility: HOSPITAL | Age: 65
Discharge: HOME | End: 2020-11-11
Admitting: PHYSICIAN ASSISTANT

## 2020-11-11 DIAGNOSIS — Z12.31 VISIT FOR SCREENING MAMMOGRAM: ICD-10-CM

## 2020-11-11 PROCEDURE — 77063 BREAST TOMOSYNTHESIS BI: CPT

## 2020-11-11 PROCEDURE — 77063 BREAST TOMOSYNTHESIS BI: CPT | Performed by: RADIOLOGY

## 2020-11-11 PROCEDURE — 77067 SCR MAMMO BI INCL CAD: CPT

## 2020-11-11 PROCEDURE — 77067 SCR MAMMO BI INCL CAD: CPT | Performed by: RADIOLOGY

## 2020-12-02 ENCOUNTER — OFFICE VISIT (OUTPATIENT)
Dept: FAMILY MEDICINE CLINIC | Facility: CLINIC | Age: 65
End: 2020-12-02

## 2020-12-02 VITALS
HEART RATE: 61 BPM | WEIGHT: 200 LBS | BODY MASS INDEX: 34.15 KG/M2 | TEMPERATURE: 97.8 F | SYSTOLIC BLOOD PRESSURE: 130 MMHG | DIASTOLIC BLOOD PRESSURE: 62 MMHG | OXYGEN SATURATION: 99 % | HEIGHT: 64 IN

## 2020-12-02 DIAGNOSIS — I10 ESSENTIAL HYPERTENSION: ICD-10-CM

## 2020-12-02 DIAGNOSIS — E03.9 HYPOTHYROIDISM, UNSPECIFIED TYPE: ICD-10-CM

## 2020-12-02 DIAGNOSIS — G47.00 INSOMNIA, UNSPECIFIED TYPE: Primary | ICD-10-CM

## 2020-12-02 DIAGNOSIS — H92.02 LEFT EAR PAIN: ICD-10-CM

## 2020-12-02 DIAGNOSIS — N95.1 MENOPAUSAL AND FEMALE CLIMACTERIC STATES: ICD-10-CM

## 2020-12-02 PROCEDURE — 99214 OFFICE O/P EST MOD 30 MIN: CPT | Performed by: PHYSICIAN ASSISTANT

## 2020-12-02 RX ORDER — LORATADINE 10 MG/1
10 TABLET ORAL DAILY
Qty: 30 TABLET | Refills: 11 | Status: SHIPPED | OUTPATIENT
Start: 2020-12-02 | End: 2021-11-23 | Stop reason: SDUPTHER

## 2020-12-02 RX ORDER — ESTRADIOL 0.1 MG/D
1 FILM, EXTENDED RELEASE TRANSDERMAL 2 TIMES WEEKLY
Qty: 8 PATCH | Refills: 11 | Status: SHIPPED | OUTPATIENT
Start: 2020-12-03 | End: 2022-01-05 | Stop reason: SDUPTHER

## 2020-12-02 RX ORDER — METOPROLOL SUCCINATE 50 MG/1
50 TABLET, EXTENDED RELEASE ORAL DAILY
Qty: 30 TABLET | Refills: 11 | Status: SHIPPED | OUTPATIENT
Start: 2020-12-02 | End: 2021-11-23 | Stop reason: SDUPTHER

## 2020-12-02 RX ORDER — LEVOTHYROXINE SODIUM 112 UG/1
112 TABLET ORAL DAILY
Qty: 30 TABLET | Refills: 11 | Status: SHIPPED | OUTPATIENT
Start: 2020-12-02 | End: 2021-11-02

## 2020-12-02 NOTE — PROGRESS NOTES
Subjective   Jeane Martines is a 65 y.o. female  Med Refill (req med refill on Estradiol, levothyroxine, loratadine and metoprolol) and Insomnia (follow up on insomnia, req refill on alprazolam )      History of Present Illness  Patient presents today for follow-up on for chronic medical conditions currently stable medication due for refills and for evaluation treatment 1 new medical problem uncontrolled.  Hypertension stable medication, hypothyroidism stable medication, menopausal symptoms of hot flashes stable on medication, insomnia stable medication.  Labs were checked within the year and are stable, mammogram was done within the last 2 months and was normal.  Patient denies any adverse effects of her medications.  Left ear pain is been bothering her for 2 weeks.  She has not seen her dentist this year but does not have any known problems with her teeth.  She does have seasonal allergies and is currently using lidocaine and Flonase over-the-counter.  The following portions of the patient's history were reviewed and updated as appropriate: allergies, current medications, past social history and problem list    Review of Systems   Constitutional: Positive for appetite change and unexpected weight change ( Weight down 23 pounds through weight watchers). Negative for fatigue.   HENT: Positive for ear pain. Negative for congestion, dental problem, ear discharge and hearing loss.    Eyes: Negative for visual disturbance.   Respiratory: Negative for cough, chest tightness and shortness of breath.    Cardiovascular: Negative for chest pain, palpitations and leg swelling.   Gastrointestinal: Negative for constipation, diarrhea and nausea.   Endocrine: Negative for cold intolerance and heat intolerance.   Skin: Negative for color change and rash.   Neurological: Negative for dizziness, tremors, syncope, weakness, light-headedness and headaches.   Hematological: Negative for adenopathy.   Psychiatric/Behavioral: Positive  for sleep disturbance ( Stable on medication). Negative for agitation, behavioral problems, confusion, decreased concentration, dysphoric mood and hallucinations. The patient is not nervous/anxious and is not hyperactive.        Objective     Vitals:    12/02/20 1130   BP: 130/62   Pulse: 61   Temp: 97.8 °F (36.6 °C)   SpO2: 99%       Physical Exam  Vitals signs and nursing note reviewed.   Constitutional:       General: She is not in acute distress.     Appearance: Normal appearance. She is well-developed. She is not ill-appearing, toxic-appearing or diaphoretic.   HENT:      Head: Normocephalic and atraumatic.      Right Ear: Tympanic membrane and ear canal normal.      Left Ear: Tympanic membrane and ear canal normal.   Eyes:      Conjunctiva/sclera: Conjunctivae normal.   Neck:      Thyroid: No thyromegaly.      Vascular: No JVD.   Cardiovascular:      Rate and Rhythm: Normal rate and regular rhythm.      Heart sounds: Normal heart sounds. No murmur.   Pulmonary:      Effort: Pulmonary effort is normal. No respiratory distress.      Breath sounds: Normal breath sounds.   Chest:      Chest wall: No tenderness.   Abdominal:      General: There is no distension.      Palpations: Abdomen is soft.      Tenderness: There is no abdominal tenderness.   Lymphadenopathy:      Cervical: No cervical adenopathy.   Skin:     General: Skin is warm and dry.      Coloration: Skin is not pale.      Findings: No erythema or rash.   Neurological:      Mental Status: She is alert and oriented to person, place, and time.      Coordination: Coordination normal.   Psychiatric:         Attention and Perception: She is attentive.         Mood and Affect: Mood normal.         Behavior: Behavior normal.         Thought Content: Thought content normal.         Judgment: Judgment normal.         Assessment/Plan     Diagnoses and all orders for this visit:    1. Insomnia, unspecified type (Primary)    2. Menopausal and female climacteric  states    3. Hypothyroidism, unspecified type    4. Essential hypertension    5. Left ear pain    Other orders  -     metoprolol succinate XL (TOPROL-XL) 50 MG 24 hr tablet; Take 1 tablet by mouth Daily.  Dispense: 30 tablet; Refill: 11  -     estradiol (Minivelle) 0.1 MG/24HR patch; Place 1 patch on the skin as directed by provider 2 (Two) Times a Week.  Apply 1 patch twice a week by transderm. route  Dispense: 8 patch; Refill: 11  -     levothyroxine (SYNTHROID, LEVOTHROID) 112 MCG tablet; Take 1 tablet by mouth Daily.  Take 1 tablet every day by oral route.  Dispense: 30 tablet; Refill: 11  -     loratadine (Claritin) 10 MG tablet; Take 1 tablet by mouth Daily. Take 1 tablet every day .  Dispense: 30 tablet; Refill: 11    Recommended patient get an appoint with her dentist for further evaluation of the left ear pain and also recommended continuing with Claritin and Flonase and assessing whether she is clenching, possible TMJ dysfunction.  Refill given of Xanax at current dosage 0.25 mg tablets 1 nightly for anxiety and/or insomnia #30 with 5 refills.  Discussed abuse potential and controlled substance as this medication.  Follow-up in 6 months for recheck.  Answers for HPI/ROS submitted by the patient on 12/1/2020   What is the primary reason for your visit?: Other  Please describe your symptoms.: Just a routine checkup and refills  Have you had these symptoms before?: No  How long have you been having these symptoms?: Greater than 2 weeks  Please list any medications you are currently taking for this condition.: N/A  Please describe any probable cause for these symptoms. : N/A

## 2021-02-15 RX ORDER — SYRINGE WITH NEEDLE, 1 ML 25GX5/8"
SYRINGE, EMPTY DISPOSABLE MISCELLANEOUS
Qty: 1 EACH | Refills: 10 | Status: SHIPPED | OUTPATIENT
Start: 2021-02-15 | End: 2022-03-08 | Stop reason: SDUPTHER

## 2021-02-24 RX ORDER — CYANOCOBALAMIN 1000 UG/ML
INJECTION, SOLUTION INTRAMUSCULAR; SUBCUTANEOUS
Qty: 1 ML | Refills: 11 | Status: SHIPPED | OUTPATIENT
Start: 2021-02-24 | End: 2022-03-08 | Stop reason: SDUPTHER

## 2021-04-15 ENCOUNTER — HOSPITAL ENCOUNTER (EMERGENCY)
Facility: HOSPITAL | Age: 66
Discharge: HOME | End: 2021-04-16
Attending: EMERGENCY MEDICINE | Admitting: EMERGENCY MEDICINE

## 2021-04-15 ENCOUNTER — APPOINTMENT (OUTPATIENT)
Dept: GENERAL RADIOLOGY | Facility: HOSPITAL | Age: 66
End: 2021-04-15

## 2021-04-15 ENCOUNTER — APPOINTMENT (OUTPATIENT)
Dept: MRI IMAGING | Facility: HOSPITAL | Age: 66
End: 2021-04-15

## 2021-04-15 ENCOUNTER — APPOINTMENT (OUTPATIENT)
Dept: CT IMAGING | Facility: HOSPITAL | Age: 66
End: 2021-04-15

## 2021-04-15 VITALS
TEMPERATURE: 98.5 F | HEART RATE: 65 BPM | BODY MASS INDEX: 36.68 KG/M2 | RESPIRATION RATE: 18 BRPM | OXYGEN SATURATION: 100 % | HEIGHT: 63 IN | WEIGHT: 207 LBS | SYSTOLIC BLOOD PRESSURE: 165 MMHG | DIASTOLIC BLOOD PRESSURE: 72 MMHG

## 2021-04-15 DIAGNOSIS — R51.9 NONINTRACTABLE HEADACHE, UNSPECIFIED CHRONICITY PATTERN, UNSPECIFIED HEADACHE TYPE: Primary | ICD-10-CM

## 2021-04-15 DIAGNOSIS — H53.9 VISION CHANGES: ICD-10-CM

## 2021-04-15 DIAGNOSIS — D64.9 ANEMIA, UNSPECIFIED TYPE: ICD-10-CM

## 2021-04-15 DIAGNOSIS — R06.00 DYSPNEA, UNSPECIFIED TYPE: ICD-10-CM

## 2021-04-15 LAB
ALBUMIN SERPL-MCNC: 3.9 G/DL (ref 3.5–5.2)
ALBUMIN/GLOB SERPL: 1.3 G/DL
ALP SERPL-CCNC: 77 U/L (ref 39–117)
ALT SERPL W P-5'-P-CCNC: 7 U/L (ref 1–33)
ANION GAP SERPL CALCULATED.3IONS-SCNC: 8 MMOL/L (ref 5–15)
AST SERPL-CCNC: 27 U/L (ref 1–32)
BASOPHILS # BLD AUTO: 0.07 10*3/MM3 (ref 0–0.2)
BASOPHILS NFR BLD AUTO: 0.8 % (ref 0–1.5)
BILIRUB SERPL-MCNC: 0.7 MG/DL (ref 0–1.2)
BUN SERPL-MCNC: 8 MG/DL (ref 8–23)
BUN/CREAT SERPL: 10.5 (ref 7–25)
CALCIUM SPEC-SCNC: 9 MG/DL (ref 8.6–10.5)
CHLORIDE SERPL-SCNC: 108 MMOL/L (ref 98–107)
CLUMPED PLATELETS: PRESENT
CO2 SERPL-SCNC: 26 MMOL/L (ref 22–29)
CREAT SERPL-MCNC: 0.76 MG/DL (ref 0.57–1)
DEPRECATED RDW RBC AUTO: 72.4 FL (ref 37–54)
EOSINOPHIL # BLD AUTO: 0.26 10*3/MM3 (ref 0–0.4)
EOSINOPHIL NFR BLD AUTO: 2.9 % (ref 0.3–6.2)
ERYTHROCYTE [DISTWIDTH] IN BLOOD BY AUTOMATED COUNT: 30 % (ref 12.3–15.4)
GFR SERPL CREATININE-BSD FRML MDRD: 77 ML/MIN/1.73
GLOBULIN UR ELPH-MCNC: 3 GM/DL
GLUCOSE SERPL-MCNC: 95 MG/DL (ref 65–99)
HCT VFR BLD AUTO: 36.3 % (ref 34–46.6)
HGB BLD-MCNC: 9.1 G/DL (ref 12–15.9)
HOLD SPECIMEN: NORMAL
HYPOCHROMIA BLD QL: NORMAL
IMM GRANULOCYTES # BLD AUTO: 0.08 10*3/MM3 (ref 0–0.05)
IMM GRANULOCYTES NFR BLD AUTO: 0.9 % (ref 0–0.5)
LYMPHOCYTES # BLD AUTO: 2.17 10*3/MM3 (ref 0.7–3.1)
LYMPHOCYTES NFR BLD AUTO: 23.8 % (ref 19.6–45.3)
MCH RBC QN AUTO: 18.1 PG (ref 26.6–33)
MCHC RBC AUTO-ENTMCNC: 25.1 G/DL (ref 31.5–35.7)
MCV RBC AUTO: 72 FL (ref 79–97)
MICROCYTES BLD QL: NORMAL
MONOCYTES # BLD AUTO: 0.8 10*3/MM3 (ref 0.1–0.9)
MONOCYTES NFR BLD AUTO: 8.8 % (ref 5–12)
NEUTROPHILS NFR BLD AUTO: 5.72 10*3/MM3 (ref 1.7–7)
NEUTROPHILS NFR BLD AUTO: 62.8 % (ref 42.7–76)
NRBC BLD AUTO-RTO: 0 /100 WBC (ref 0–0.2)
NT-PROBNP SERPL-MCNC: 177.4 PG/ML (ref 0–900)
OVALOCYTES BLD QL SMEAR: NORMAL
PLATELET # BLD AUTO: 358 10*3/MM3 (ref 140–450)
PMV BLD AUTO: 9.7 FL (ref 6–12)
POLYCHROMASIA BLD QL SMEAR: NORMAL
POTASSIUM SERPL-SCNC: 4.2 MMOL/L (ref 3.5–5.2)
PROT SERPL-MCNC: 6.9 G/DL (ref 6–8.5)
RBC # BLD AUTO: 5.04 10*6/MM3 (ref 3.77–5.28)
SMALL PLATELETS BLD QL SMEAR: ADEQUATE
SODIUM SERPL-SCNC: 142 MMOL/L (ref 136–145)
TROPONIN T SERPL-MCNC: <0.01 NG/ML (ref 0–0.03)
WBC # BLD AUTO: 9.1 10*3/MM3 (ref 3.4–10.8)
WBC MORPH BLD: NORMAL
WHOLE BLOOD HOLD SPECIMEN: NORMAL
WHOLE BLOOD HOLD SPECIMEN: NORMAL

## 2021-04-15 PROCEDURE — 93005 ELECTROCARDIOGRAM TRACING: CPT | Performed by: EMERGENCY MEDICINE

## 2021-04-15 PROCEDURE — 80053 COMPREHEN METABOLIC PANEL: CPT

## 2021-04-15 PROCEDURE — 83880 ASSAY OF NATRIURETIC PEPTIDE: CPT

## 2021-04-15 PROCEDURE — 84484 ASSAY OF TROPONIN QUANT: CPT

## 2021-04-15 PROCEDURE — 71045 X-RAY EXAM CHEST 1 VIEW: CPT

## 2021-04-15 PROCEDURE — 85007 BL SMEAR W/DIFF WBC COUNT: CPT

## 2021-04-15 PROCEDURE — 85025 COMPLETE CBC W/AUTO DIFF WBC: CPT

## 2021-04-15 PROCEDURE — 70450 CT HEAD/BRAIN W/O DYE: CPT

## 2021-04-15 PROCEDURE — 71275 CT ANGIOGRAPHY CHEST: CPT

## 2021-04-15 PROCEDURE — 93005 ELECTROCARDIOGRAM TRACING: CPT

## 2021-04-15 PROCEDURE — 70551 MRI BRAIN STEM W/O DYE: CPT

## 2021-04-15 PROCEDURE — 0 IOPAMIDOL PER 1 ML: Performed by: EMERGENCY MEDICINE

## 2021-04-15 PROCEDURE — 99284 EMERGENCY DEPT VISIT MOD MDM: CPT

## 2021-04-15 RX ORDER — SODIUM CHLORIDE 0.9 % (FLUSH) 0.9 %
10 SYRINGE (ML) INJECTION AS NEEDED
Status: DISCONTINUED | OUTPATIENT
Start: 2021-04-15 | End: 2021-04-16 | Stop reason: HOSPADM

## 2021-04-15 RX ADMIN — IOPAMIDOL 80 ML: 755 INJECTION, SOLUTION INTRAVENOUS at 20:56

## 2021-04-16 NOTE — ED PROVIDER NOTES
Subjective   Ms. Jeane Martines is a 65 y.o. female who presents to the ED with c/o shortness of breath. She reports for the past 3 weeks she has been experiencing shortness of breath which is worse with exertion. She denies swelling, cough, congestion, fever, or chills. She notes she has allergies and gets lung infections on an annual basis. Last night while watching TV she noticed grey blotches in her left field of vision. She also had frontal headache at that time. She did not have focal weakness, slurred speech, or any other neurologic symptoms at that time. She went to sleep and this was still present today. She spoke with her ophthalmologist who she last saw 1 month ago. She was told everything was normal at that time and they could see her tomorrow. She has a history of anemia and does not take her iron supplement regularly, but she has been since she has been short of breath. She receives vitamin B12 injections monthly. She has a history of hypertension but no cardiac or lung problems. She has received COVID immunization and denies known COVID contact. There are no other acute symptoms at this time.      History provided by:  Patient  Shortness of Breath  Severity:  Moderate  Onset quality:  Gradual  Duration:  3 weeks  Timing:  Constant  Progression:  Unchanged  Chronicity:  New  Relieved by:  Nothing  Worsened by:  Exertion  Associated symptoms: headaches    Associated symptoms: no cough, no fever and no vomiting        Review of Systems   Constitutional: Negative for chills and fever.   HENT: Negative for congestion.    Eyes: Positive for visual disturbance.   Respiratory: Positive for shortness of breath. Negative for cough.    Cardiovascular: Negative for leg swelling.   Gastrointestinal: Negative for nausea and vomiting.   Genitourinary: Negative for difficulty urinating and urgency.   Neurological: Positive for headaches. Negative for speech difficulty and weakness.   All other systems reviewed and  are negative.      Past Medical History:   Diagnosis Date   • Allergic 3 yrs   • Anemia    • Depression    • Hypertension    • Thyroid disease        No Known Allergies    Past Surgical History:   Procedure Laterality Date   • COLONOSCOPY     • HYSTERECTOMY     • OOPHORECTOMY     • TUBAL ABDOMINAL LIGATION         Family History   Problem Relation Age of Onset   • Breast cancer Paternal Aunt         IN HER 30'S   • Heart attack Mother    • Hypertension Mother    • Thyroid disease Father    • Hypertension Father    • Hypertension Sister    • Breast cancer Sister 78   • Heart attack Brother    • Diabetes Maternal Grandmother    • Ovarian cancer Neg Hx        Social History     Socioeconomic History   • Marital status:      Spouse name: Not on file   • Number of children: Not on file   • Years of education: Not on file   • Highest education level: Not on file   Tobacco Use   • Smoking status: Never Smoker   • Smokeless tobacco: Never Used   Substance and Sexual Activity   • Alcohol use: No   • Drug use: No   • Sexual activity: Yes     Partners: Male     Birth control/protection: Post-menopausal         Objective   Physical Exam  Vitals and nursing note reviewed.   Constitutional:       General: She is not in acute distress.     Appearance: Normal appearance. She is well-developed. She is not ill-appearing or toxic-appearing.   HENT:      Head: Normocephalic and atraumatic.      Mouth/Throat:      Mouth: Mucous membranes are moist.   Eyes:      General: Lids are normal.      Extraocular Movements: Extraocular movements intact.      Conjunctiva/sclera: Conjunctivae normal.      Pupils: Pupils are equal, round, and reactive to light.   Neck:      Trachea: Trachea normal.   Cardiovascular:      Rate and Rhythm: Normal rate and regular rhythm.      Pulses: Normal pulses.      Heart sounds: Normal heart sounds.   Pulmonary:      Effort: Pulmonary effort is normal. No respiratory distress.      Breath sounds: Normal  breath sounds. No decreased breath sounds, wheezing, rhonchi or rales.   Abdominal:      General: Bowel sounds are normal.      Palpations: Abdomen is soft.      Tenderness: There is no abdominal tenderness.   Musculoskeletal:         General: Normal range of motion.      Cervical back: Full passive range of motion without pain and normal range of motion.   Skin:     General: Skin is warm and dry.      Findings: No rash.   Neurological:      Mental Status: She is alert and oriented to person, place, and time.      Cranial Nerves: No cranial nerve deficit.      Motor: No weakness.   Psychiatric:         Speech: Speech normal.         Behavior: Behavior normal. Behavior is cooperative.         Procedures         ED Course  ED Course as of Apr 18 0419   Thu Apr 15, 2021   6261 Results are discussed with patient at this time.  Patient will be discharged home.  Patient to follow-up with ophthalmology as planned.  Patient to return to the ED as needed.  Patient agrees and verbalized understanding.    [KG]      ED Course User Index  [KG] Ani Levine, APRN     No results found for this or any previous visit (from the past 24 hour(s)).  Note: In addition to lab results from this visit, the labs listed above may include labs taken at another facility or during a different encounter within the last 24 hours. Please correlate lab times with ED admission and discharge times for further clarification of the services performed during this visit.    MRI Brain Without Contrast   Final Result      1. No evidence for recent infarct.   2. Mild generalized atrophy.   3. No obvious abnormality left orbit on routine MRI brain. This examination is insensitive for diagnosis of papilledema or optic neuritis. Please evaluate further clinically and if more imaging information is needed, recommend correlation with a   dedicated MRI of the orbits with and without contrast.      Signer Name: Judith Muniz MD    Signed: 4/15/2021 11:39 PM     Workstation Name: [facility]     Radiology [facility]      CT Angiogram Chest   Final Result   1. Negative for pulmonary embolus.      2. No acute findings in the chest.      Signer Name: Kindra Jack MD    Signed: 4/15/2021 9:10 PM    Workstation Name: [id number]     Radiology [facility]      CT Head Without Contrast   Final Result   Minor probable sequelae of small vessel disease, otherwise negative noncontrast head CT. If there is clinical concern for acute CVA, follow-up imaging is recommended.      Signer Name: Judith Muniz MD    Signed: 4/15/2021 9:04 PM    Workstation Name: [facility]     Radiology [facility]      XR Chest 1 View   Final Result   No acute cardiopulmonary findings.      Signer Name: Judith Muniz MD    Signed: 4/15/2021 7:25 PM    Workstation Name: [facility]        Vitals:    04/15/21 2000 04/15/21 2100 04/15/21 2130 04/15/21 2200   BP: 126/87 148/68 144/67 165/72   BP Location:       Patient Position:       Pulse: 70 68 68 65   Resp:       Temp:       TempSrc:       SpO2: 99% 99% 98% 100%   Weight:       Height:         Medications   iopamidol (ISOVUE-370) 76 % injection 100 mL (80 mL Intravenous Given 4/15/21 2056)     ECG/EMG Results (last 24 hours)     Procedure Component Value Units Date/Time    ECG 12 Lead [[id number]] Collected: 04/15/21 1859     Updated: 04/15/21 1859        ECG 12 Lead                                                    MDM    Final diagnoses:   Nonintractable headache, unspecified chronicity pattern, unspecified headache type   Vision changes   Dyspnea, unspecified type   Anemia, unspecified type       Documentation assistance provided by rekha Buck.  Information recorded by the scribe was done at my direction and has been verified and validated by me.     Asher Buck  04/15/21 7461       Ani Levine, OKSANA  04/18/21 3987

## 2021-04-21 LAB
QT INTERVAL: 374 MS
QTC INTERVAL: 412 MS

## 2021-04-28 ENCOUNTER — OFFICE VISIT (OUTPATIENT)
Dept: FAMILY MEDICINE CLINIC | Facility: CLINIC | Age: 66
End: 2021-04-28

## 2021-04-28 VITALS
SYSTOLIC BLOOD PRESSURE: 136 MMHG | HEART RATE: 78 BPM | DIASTOLIC BLOOD PRESSURE: 72 MMHG | TEMPERATURE: 97.2 F | HEIGHT: 63 IN | BODY MASS INDEX: 37.03 KG/M2 | OXYGEN SATURATION: 100 % | WEIGHT: 209 LBS

## 2021-04-28 DIAGNOSIS — I10 ESSENTIAL HYPERTENSION: ICD-10-CM

## 2021-04-28 DIAGNOSIS — R06.09 DYSPNEA ON EXERTION: ICD-10-CM

## 2021-04-28 DIAGNOSIS — D64.9 CHRONIC ANEMIA: Primary | ICD-10-CM

## 2021-04-28 DIAGNOSIS — F41.1 GAD (GENERALIZED ANXIETY DISORDER): ICD-10-CM

## 2021-04-28 DIAGNOSIS — H35.62 RETINAL HEMORRHAGE OF LEFT EYE: ICD-10-CM

## 2021-04-28 PROCEDURE — 99214 OFFICE O/P EST MOD 30 MIN: CPT | Performed by: PHYSICIAN ASSISTANT

## 2021-04-28 RX ORDER — FLUTICASONE PROPIONATE 50 MCG
2 SPRAY, SUSPENSION (ML) NASAL DAILY
COMMUNITY

## 2021-04-28 RX ORDER — CITALOPRAM 20 MG/1
20 TABLET ORAL DAILY
Qty: 30 TABLET | Refills: 11 | Status: SHIPPED | OUTPATIENT
Start: 2021-04-28 | End: 2022-03-08 | Stop reason: SDUPTHER

## 2021-04-28 NOTE — PROGRESS NOTES
Subjective   Jeane Martines is a 65 y.o. female  Hospital Follow Up Visit (Follow up from  ED for headache/SOB and blurry vision, all has resolved except blurry vision) and Insomnia (Follow up on insomnia, req refills on alprazolam )      History of Present Illness  Patient is a pleasant 65-year-old white female comes in today for follow-up on worsening anemia has history of B12 anemia and chronic iron deficient anemia, she seen the ER recently presented with shortness of breath and visual changes.  She is also following up in generalized anxiety disorder hypertension.  She has been seen by retinal specialist and for ER visit was diagnosed with a small retinal hemorrhage in the left eye scheduled to follow-up with them couple of weeks.  She denies any shortness of breath at this time or chest pain at this time.  Patient has been evaluated in the past she had an EGD and colonoscopy in 2018 which were both normal, hemoglobin and hematocrit normalized the last couple of years and was normal as recently as last year.  Hemoglobin/hematocrit in ER this month was seen to be 9.1/36.3 with MCV of 72.  Anxiety currently stable combination of Celexa and Xanax she is due for refills.  Denies any adverse effects of this medication.  Hypertension currently stable on medication.  The following portions of the patient's history were reviewed and updated as appropriate: allergies, current medications, past social history and problem list    Review of Systems   Constitutional: Positive for fatigue. Negative for appetite change, diaphoresis and unexpected weight change.   HENT: Negative for postnasal drip and sinus pressure.    Eyes: Positive for visual disturbance.   Respiratory: Positive for shortness of breath. Negative for cough, choking, chest tightness and wheezing.    Cardiovascular: Negative for chest pain and leg swelling.   Gastrointestinal: Negative.  Negative for blood in stool.   Genitourinary: Negative for hematuria.    Neurological: Positive for light-headedness.   Psychiatric/Behavioral: The patient is not nervous/anxious.         Anxiety currently stable on medications       Objective     Vitals:    04/28/21 1500   BP: 136/72   Pulse: 78   Temp: 97.2 °F (36.2 °C)   SpO2: 100%       Physical Exam  Vitals and nursing note reviewed.   Constitutional:       General: She is not in acute distress.     Appearance: Normal appearance. She is well-developed. She is obese. She is not ill-appearing, toxic-appearing or diaphoretic.   Neck:      Vascular: No JVD.   Cardiovascular:      Rate and Rhythm: Normal rate and regular rhythm.      Heart sounds: Normal heart sounds. No murmur heard.     Pulmonary:      Effort: Pulmonary effort is normal. No respiratory distress.      Breath sounds: Normal breath sounds.   Chest:      Chest wall: No tenderness.   Abdominal:      General: There is no distension.      Palpations: Abdomen is soft.      Tenderness: There is no abdominal tenderness.   Skin:     General: Skin is warm and dry.      Coloration: Skin is not pale.      Findings: No erythema.   Neurological:      Mental Status: She is alert.   Psychiatric:         Mood and Affect: Mood normal.         Behavior: Behavior normal.         Thought Content: Thought content normal.         Judgment: Judgment normal.     BMI 37    Assessment/Plan     Diagnoses and all orders for this visit:    1. Chronic anemia (Primary)  -     Ambulatory Referral to Hematology    2. Dyspnea on exertion    3. Essential hypertension    4. INO (generalized anxiety disorder)    5. Retinal hemorrhage of left eye    Other orders  -     citalopram (CeleXA) 20 MG tablet; Take 1 tablet by mouth Daily.  Dispense: 30 tablet; Refill: 11       Answers for HPI/ROS submitted by the patient on 4/21/2021  Please describe your symptoms.: Anemic  Have you had these symptoms before?: Yes  How long have you been having these symptoms?: Greater than 2 weeks  What is the primary reason for  your visit?: Other  Discussed with patient my recommendation we refer her to hematology due to chronic worsening anemia with negative GI work-up thus far.  She is postmenopausal.  Instructed her to take over-the-counter iron supplement twice daily dosing by hematology.  She will follow up with her retinal specialist regarding her retinal hemorrhage, refilled Celexa and also given refill for 6-month supply of current dosage of Xanax 0.25 mg daily as needed for anxiety #30 with 5 refills today.  Discussed abuse potential and controlled substance status of this medication.  Discussed possible need to refer back to GI for further evaluation of anemia to evaluate small bowel.

## 2021-05-11 ENCOUNTER — TELEPHONE (OUTPATIENT)
Dept: ONCOLOGY | Facility: CLINIC | Age: 66
End: 2021-05-11

## 2021-05-11 ENCOUNTER — LAB (OUTPATIENT)
Dept: LAB | Facility: HOSPITAL | Age: 66
End: 2021-05-11

## 2021-05-11 ENCOUNTER — CONSULT (OUTPATIENT)
Dept: ONCOLOGY | Facility: CLINIC | Age: 66
End: 2021-05-11

## 2021-05-11 VITALS
HEIGHT: 63 IN | OXYGEN SATURATION: 99 % | TEMPERATURE: 97.3 F | WEIGHT: 211 LBS | RESPIRATION RATE: 16 BRPM | BODY MASS INDEX: 37.39 KG/M2 | DIASTOLIC BLOOD PRESSURE: 77 MMHG | HEART RATE: 68 BPM | SYSTOLIC BLOOD PRESSURE: 191 MMHG

## 2021-05-11 DIAGNOSIS — D50.9 MICROCYTIC ANEMIA: ICD-10-CM

## 2021-05-11 DIAGNOSIS — D50.9 MICROCYTIC ANEMIA: Primary | ICD-10-CM

## 2021-05-11 DIAGNOSIS — D50.9 IRON DEFICIENCY ANEMIA, UNSPECIFIED IRON DEFICIENCY ANEMIA TYPE: Primary | ICD-10-CM

## 2021-05-11 DIAGNOSIS — K90.9 IRON MALABSORPTION: ICD-10-CM

## 2021-05-11 PROCEDURE — 85025 COMPLETE CBC W/AUTO DIFF WBC: CPT

## 2021-05-11 PROCEDURE — 99204 OFFICE O/P NEW MOD 45 MIN: CPT | Performed by: INTERNAL MEDICINE

## 2021-05-11 PROCEDURE — 85045 AUTOMATED RETICULOCYTE COUNT: CPT

## 2021-05-11 PROCEDURE — 83540 ASSAY OF IRON: CPT

## 2021-05-11 PROCEDURE — 82607 VITAMIN B-12: CPT

## 2021-05-11 PROCEDURE — 82746 ASSAY OF FOLIC ACID SERUM: CPT

## 2021-05-11 PROCEDURE — 82728 ASSAY OF FERRITIN: CPT

## 2021-05-11 PROCEDURE — 84466 ASSAY OF TRANSFERRIN: CPT

## 2021-05-11 PROCEDURE — 36415 COLL VENOUS BLD VENIPUNCTURE: CPT

## 2021-05-11 RX ORDER — FAMOTIDINE 10 MG/ML
20 INJECTION, SOLUTION INTRAVENOUS ONCE
Status: CANCELLED | OUTPATIENT
Start: 2021-05-18

## 2021-05-11 RX ORDER — SODIUM CHLORIDE 9 MG/ML
250 INJECTION, SOLUTION INTRAVENOUS ONCE
Status: CANCELLED | OUTPATIENT
Start: 2021-05-25

## 2021-05-11 RX ORDER — DIPHENHYDRAMINE HCL 25 MG
25 CAPSULE ORAL ONCE
Status: CANCELLED | OUTPATIENT
Start: 2021-05-25

## 2021-05-11 RX ORDER — DIPHENHYDRAMINE HCL 25 MG
25 CAPSULE ORAL ONCE
Status: CANCELLED | OUTPATIENT
Start: 2021-05-18

## 2021-05-11 RX ORDER — SODIUM CHLORIDE 9 MG/ML
250 INJECTION, SOLUTION INTRAVENOUS ONCE
Status: CANCELLED | OUTPATIENT
Start: 2021-05-18

## 2021-05-11 RX ORDER — FAMOTIDINE 10 MG/ML
20 INJECTION, SOLUTION INTRAVENOUS ONCE
Status: CANCELLED | OUTPATIENT
Start: 2021-05-25

## 2021-05-11 NOTE — PROGRESS NOTES
DATE OF CONSULTATION: 5/11/2021    REFERRING PHYSICIAN: Vijaya Figueroa PA-C    Dear Vijaya Franco PA-C  Thank you for asking for my medical advice on this patient. I saw her in the  [place] office on 5/11/2021    REASON FOR CONSULTATION: Microcytic anemia    HISTORY OF PRESENT ILLNESS: The patient is a very pleasant 65 y.o.  female   who was in her usual state of health until April 2021.  Patient presented with generalized fatigue and weakness.  This has been getting gradually worse.  Associated with blurry vision.  She lost vision in her left eye.  The patient was concerned and went to local emergency room at [facility].  MRI brain was negative for stroke however her labs revealed anemia with hemoglobin of 9.2.  The patient was referred to me for further recommendations.    SUBJECTIVE: When I saw the patient today she is here by herself.  She has been anemic all her life.  She has been on oral iron placement on and off.  She could not tolerate secondary to multiple GI side effects including constipation and nausea.  She is an active bleeding.  She had EGD and colonoscopy done by Dr. Hernandez 2018 that were negative.    Review of Systems   Constitutional: Positive for fatigue. Negative for activity change, appetite change, chills, fever and unexpected weight change.   HENT: Negative for hearing loss, mouth sores, nosebleeds, sore throat and trouble swallowing.    Eyes: Negative for visual disturbance.   Respiratory: Negative for cough, chest tightness, shortness of breath and wheezing.    Cardiovascular: Negative for chest pain, palpitations and leg swelling.   Gastrointestinal: Negative for abdominal distention, abdominal pain, blood in stool, constipation, diarrhea, nausea, rectal pain and vomiting.   Endocrine: Negative for cold intolerance and heat intolerance.   Genitourinary: Negative for difficulty urinating, dysuria, frequency and urgency.   Musculoskeletal: Negative for  "arthralgias, back pain, gait problem, joint swelling and myalgias.   Skin: Negative for rash.   Neurological: Negative for dizziness, tremors, syncope, weakness, light-headedness, numbness and headaches.   Hematological: Negative for adenopathy. Does not bruise/bleed easily.   Psychiatric/Behavioral: Negative for confusion, sleep disturbance and suicidal ideas. The patient is not nervous/anxious.        Past Medical History:   Diagnosis Date   • Allergic 3 yrs   • Anemia    • Depression    • Hypertension    • Thyroid disease        Social History     Socioeconomic History   • Marital status:      Spouse name: Not on file   • Number of children: Not on file   • Years of education: Not on file   • Highest education level: Not on file   Tobacco Use   • Smoking status: Never Smoker   • Smokeless tobacco: Never Used   Substance and Sexual Activity   • Alcohol use: No   • Drug use: No   • Sexual activity: Yes     Partners: Male     Birth control/protection: Post-menopausal       Family History   Problem Relation Age of Onset   • Breast cancer Paternal Aunt         IN HER 30'S   • Heart attack Mother    • Hypertension Mother    • Thyroid disease Father    • Hypertension Father    • Hypertension Sister    • Breast cancer Sister 78   • Heart attack Brother    • Diabetes Maternal Grandmother    • Ovarian cancer Neg Hx        Past Surgical History:   Procedure Laterality Date   • COLONOSCOPY     • HYSTERECTOMY     • OOPHORECTOMY     • TUBAL ABDOMINAL LIGATION         No Known Allergies       Current Outpatient Medications:   •  ALPRAZolam (XANAX) 0.25 MG tablet, 0.25 mg At Night As Needed.  Take 1 tablet every day by oral route as needed., Disp: , Rfl:   •  B-D 3CC LUER-LEXX SYR 25GX1\" 25G X 1\" 3 ML misc, USE TO INJECT CYANOCOBALAMIN EVERY 30 DAYS, Disp: 1 each, Rfl: 10  •  Cholecalciferol (VITAMIN D3) 50 MCG (2000 UT) tablet, Take 1 tablet by mouth Daily., Disp: 30 tablet, Rfl: 11  •  citalopram (CeleXA) 20 MG tablet, " "Take 1 tablet by mouth Daily., Disp: 30 tablet, Rfl: 11  •  cyanocobalamin 1000 MCG/ML injection, ADMINISTER 1 MILLILITER INTRAMUSCULARLY ONCE MONTHLY FOR PERNICIOUS ANEMIA, Disp: 1 mL, Rfl: 11  •  estradiol (Minivelle) 0.1 MG/24HR patch, Place 1 patch on the skin as directed by provider 2 (Two) Times a Week.  Apply 1 patch twice a week by transderm. route, Disp: 8 patch, Rfl: 11  •  ferrous sulfate 325 (65 FE) MG tablet, Every 12 (Twelve) Hours. Every 12 (Twelve) Hours., Disp: , Rfl:   •  fluticasone (FLONASE) 50 MCG/ACT nasal spray, 2 sprays into the nostril(s) as directed by provider Daily., Disp: , Rfl:   •  levothyroxine (SYNTHROID, LEVOTHROID) 112 MCG tablet, Take 1 tablet by mouth Daily.  Take 1 tablet every day by oral route., Disp: 30 tablet, Rfl: 11  •  loratadine (Claritin) 10 MG tablet, Take 1 tablet by mouth Daily. Take 1 tablet every day ., Disp: 30 tablet, Rfl: 11  •  metoprolol succinate XL (TOPROL-XL) 50 MG 24 hr tablet, Take 1 tablet by mouth Daily., Disp: 30 tablet, Rfl: 11    PHYSICAL EXAMINATION:   BP (!) 191/77   Pulse 68   Temp 97.3 °F (36.3 °C) (Temporal)   Resp 16   Ht 160 cm (62.99\")   Wt 95.7 kg (211 lb)   SpO2 99%   BMI 37.39 kg/m²   Pain Score    05/11/21 1346   PainSc: 0-No pain                   ECOG Performance Status: 1 - Symptomatic but completely ambulatory  General Appearance:  alert, cooperative, no apparent distress and appears stated age   Neurologic/Psychiatric: A&O x 3, gait steady, appropriate affect, strength 5/5 in all muscle groups   HEENT:  Normocephalic, without obvious abnormality, mucous membranes moist   Neck: Supple, symmetrical, trachea midline, no adenopathy;  No thyromegaly, masses, or tenderness   Lungs:   Clear to auscultation bilaterally; respirations regular, even, and unlabored bilaterally   Heart:  Regular rate and rhythm, no murmurs appreciated   Abdomen:   Soft, non-tender, non-distended and no organomegaly   Lymph nodes: No cervical, " supraclavicular, inguinal or axillary adenopathy noted   Extremities: Normal, atraumatic; no clubbing, cyanosis, or edema    Skin: No rashes, ulcers, or suspicious lesions noted       Lab on 05/11/2021   Component Date Value Ref Range Status   • WBC 05/11/2021 6.90  3.40 - 10.80 10*3/mm3 Final   • RBC 05/11/2021 4.73  3.77 - 5.28 10*6/mm3 Final   • Hemoglobin 05/11/2021 10.7* 12.0 - 15.9 g/dL Final   • Hematocrit 05/11/2021 35.2  34.0 - 46.6 % Final   • RDW 05/11/2021 31.0* 12.3 - 15.4 % Final   • MCV 05/11/2021 74.5* 79.0 - 97.0 fL Final   • MCH 05/11/2021 22.7* 26.6 - 33.0 pg Final   • MCHC 05/11/2021 30.5* 31.5 - 35.7 g/dL Final   • MPV 05/11/2021 8.2  6.0 - 12.0 fL Final   • Platelets 05/11/2021 338  140 - 450 10*3/mm3 Final   • Neutrophil % 05/11/2021 59.7  42.7 - 76.0 % Final   • Lymphocyte % 05/11/2021 31.8  19.6 - 45.3 % Final   • Monocyte % 05/11/2021 8.5  5.0 - 12.0 % Final   • Neutrophils, Absolute 05/11/2021 4.10  1.70 - 7.00 10*3/mm3 Final   • Lymphocytes, Absolute 05/11/2021 2.20  0.70 - 3.10 10*3/mm3 Final   • Monocytes, Absolute 05/11/2021 0.60  0.10 - 0.90 10*3/mm3 Final        CT Head Without Contrast    Result Date: 4/15/2021  Narrative: HISTORY: Headache and visual disturbance left eye. Short of air. ER evaluation. TECHNIQUE: CT head without contrast. Radiation dose reduction techniques included automated exposure control or exposure modulation based on body size. Radiation audit for number of CT and nuclear cardiology exams performed in the last year: 0.  COMPARISON: None FINDINGS: There is no displaced calvarial fracture. The visualized paranasal sinuses and mastoid air cells are clear. There is no evidence for acute intracranial hemorrhage or extra-axial fluid collection. There is minimal periventricular white matter low-attenuation that is nonspecific but likely due to small vessel disease and within the range of typically seen in age group. There is no acute cortical infarct suspected. There  is no intracranial mass effect.     Impression: Minor probable sequelae of small vessel disease, otherwise negative noncontrast head CT. If there is clinical concern for acute CVA, follow-up imaging is recommended. Signer Name: Judith Muniz MD  Signed: 4/15/2021 9:04 PM  Workstation Name: [email or web address]  [facility]    MRI Brain Without Contrast    Result Date: 4/15/2021  Narrative: INDICATION:  Stroke follow-up. Complains of blurriness left eye onset last night. TECHNIQUE: MRI of the brain without contrast. COMPARISON:  Earlier noncontrast head CT. FINDINGS: There is no evidence for a recent infarct on the diffusion series. The midline structures are normal. There is no extra-axial fluid collection. The major arterial intracranial flow voids are maintained. The ventricles are normal in size and configuration. Mild generalized volume loss and there are tiny lacuna and/or prominent perivascular spaces in the bilateral basal ganglia. Otherwise the gray-white junction is relatively well maintained for age group. The globes are intact and the lenses are located. No retrobulbar mass is suspected on routine brain MRI. Superior ophthalmic veins are symmetric. If there is clinical concern for optic neuritis or papilledema, please correlate further clinically since routine brain MRI is not sensitive for these possibilities. If more imaging information becomes necessary a dedicated MRI of the orbits with and without contrast could be obtained. The basilar cisterns are patent. There is no evidence for acute intracranial hemorrhage or intracranial mass effect.     Impression: 1. No evidence for recent infarct. 2. Mild generalized atrophy. 3. No obvious abnormality left orbit on routine MRI brain. This examination is insensitive for diagnosis of papilledema or optic neuritis. Please evaluate further clinically and if more imaging information is needed, recommend correlation with a dedicated MRI of the orbits  with and without contrast. Signer Name: Judith Muniz MD  Signed: 4/15/2021 11:39 PM  Workstation Name: [email or web address]  [facility]    XR Chest 1 View    Result Date: 4/15/2021  Narrative: CR Chest 1 Vw INDICATION: Short of air for a couple of months with blurred vision in left eye since last night. History of hypertension. COMPARISON:  None available. FINDINGS: Single portable AP view(s) of the chest.  Cardiac silhouette is top normal in size. There is no acute-appearing parenchymal infiltrate or acute congestive failure. There is no pleural effusion or pneumothorax.     Impression: No acute cardiopulmonary findings. Signer Name: uJdith Muniz MD  Signed: 4/15/2021 7:25 PM  Workstation Name: [facility]  [facility]    CT Angiogram Chest    Result Date: 4/15/2021  Narrative: CTA Chest INDICATION: Shortness of breath, headache, visual changes TECHNIQUE: CT angiogram of the chest with IV contrast. 3-D reconstructions were obtained and reviewed.   Radiation dose reduction techniques included automated exposure control or exposure modulation based on body size. Count of known CT and cardiac nuc med studies performed in previous 12 months: 0. COMPARISON: None available. FINDINGS: There is no evidence of pulmonary embolism. There is a prominent gallstone. The lung fields are grossly clear. Upper abdominal structures otherwise appear within normal limits. Cardiac structures are unremarkable. No acute osseous abnormality.     Impression: 1. Negative for pulmonary embolus. 2. No acute findings in the chest. Signer Name: Kindra Jack MD  Signed: 4/15/2021 9:10 PM  Workstation Name: [id number]  Radiology [facility]        DIAGNOSTIC DATA:   1. Radiology: As above  2. Mrs. Figueroa's note reviewed by me and documented in the  chart.   3. Pathology report:   Collected: 5/11/2012   Received: 5/12/2012   Reported: 5/14/2012     Clinical Diagnosis and History   The working history  is ALFREDO, and persistent cervical dysplasia.       Final Diagnosis   CERVIX:        Chronic cystic cervicitis and squamous metaplasia.     DGD/sk     4. Laboratory data: As above    ASSESSMENT: The patient is a very pleasant 65 y.o.  female  with microcytic anemia    PROBLEM LIST:   1.  Anemia  2.  Microcytosis:  A.  Negative EGD and colonoscopy done by Dr. Andrade November 2018  3.  Hypothyroid  4.  Hypertension  5.  Depression    PLAN:   1. I had a long discussion today with the patient about her  new diagnosis of anemia. I did go over the final pathology report in  detail with both of them. I reviewed the patient's documents including refereing provider's notes, lab results, imaging, and path report.   2.  I explained to the patient there are 2 possibilities for her anemia either underproduction versus increased peripheral destruction.  In order to narrow down the differential diagnosis I will check the following work-up: CBC with differential, iron profile, serum ferritin, folic acid, vitamin B12, reticulocyte count, and BMP.  3.  With her microcytosis this could be induced by iron deficiency.  The patient had completely normal colonoscopy as well as endoscopy done by Dr. Andrade November 2018 and she will be due to have another one in 10 years.  If she does have evidence of iron deficiency I will refer her back to GI for upper endoscopy and possibly capsule enteroscopy.  4.  The patient will be set up for 2 dose of IV iron if she has evidence of iron deficiency including ferritin less than 50 or saturation less than 15%.  5.  She will follow with me in 6-month with repeated labs.      France Aden MD  5/11/2021

## 2021-05-11 NOTE — TELEPHONE ENCOUNTER
Called to let patient know her iron studies and need for iv iron.  Advised she will hear from our  in regards to starting next week after getting insurance approval.  Patient verbalized understanding.

## 2021-05-17 ENCOUNTER — TELEPHONE (OUTPATIENT)
Dept: ONCOLOGY | Facility: CLINIC | Age: 66
End: 2021-05-17

## 2021-05-17 DIAGNOSIS — D50.9 IRON DEFICIENCY ANEMIA, UNSPECIFIED IRON DEFICIENCY ANEMIA TYPE: Primary | ICD-10-CM

## 2021-05-17 DIAGNOSIS — K90.9 IRON MALABSORPTION: ICD-10-CM

## 2021-05-17 RX ORDER — SODIUM CHLORIDE 9 MG/ML
250 INJECTION, SOLUTION INTRAVENOUS ONCE
Status: CANCELLED | OUTPATIENT
Start: 2021-06-22

## 2021-05-17 RX ORDER — SODIUM CHLORIDE 9 MG/ML
250 INJECTION, SOLUTION INTRAVENOUS ONCE
Status: CANCELLED | OUTPATIENT
Start: 2021-05-25

## 2021-05-17 RX ORDER — FAMOTIDINE 10 MG/ML
20 INJECTION, SOLUTION INTRAVENOUS ONCE
Status: CANCELLED | OUTPATIENT
Start: 2021-06-22

## 2021-05-17 RX ORDER — SODIUM CHLORIDE 9 MG/ML
250 INJECTION, SOLUTION INTRAVENOUS ONCE
Status: CANCELLED | OUTPATIENT
Start: 2021-06-08

## 2021-05-17 RX ORDER — FAMOTIDINE 10 MG/ML
20 INJECTION, SOLUTION INTRAVENOUS ONCE
Status: CANCELLED | OUTPATIENT
Start: 2021-06-15

## 2021-05-17 RX ORDER — SODIUM CHLORIDE 9 MG/ML
250 INJECTION, SOLUTION INTRAVENOUS ONCE
Status: CANCELLED | OUTPATIENT
Start: 2021-06-29

## 2021-05-17 RX ORDER — FAMOTIDINE 10 MG/ML
20 INJECTION, SOLUTION INTRAVENOUS ONCE
Status: CANCELLED | OUTPATIENT
Start: 2021-05-25

## 2021-05-17 RX ORDER — SODIUM CHLORIDE 9 MG/ML
250 INJECTION, SOLUTION INTRAVENOUS ONCE
Status: CANCELLED | OUTPATIENT
Start: 2021-05-18

## 2021-05-17 RX ORDER — FAMOTIDINE 10 MG/ML
20 INJECTION, SOLUTION INTRAVENOUS ONCE
Status: CANCELLED | OUTPATIENT
Start: 2021-06-29

## 2021-05-17 RX ORDER — ACETAMINOPHEN 325 MG/1
650 TABLET ORAL ONCE
Status: CANCELLED | OUTPATIENT
Start: 2021-06-15

## 2021-05-17 RX ORDER — ACETAMINOPHEN 325 MG/1
650 TABLET ORAL ONCE
Status: CANCELLED | OUTPATIENT
Start: 2021-06-22

## 2021-05-17 RX ORDER — ACETAMINOPHEN 325 MG/1
650 TABLET ORAL ONCE
Status: CANCELLED | OUTPATIENT
Start: 2021-05-18

## 2021-05-17 RX ORDER — FAMOTIDINE 10 MG/ML
20 INJECTION, SOLUTION INTRAVENOUS ONCE
Status: CANCELLED | OUTPATIENT
Start: 2021-05-18

## 2021-05-17 RX ORDER — FAMOTIDINE 10 MG/ML
20 INJECTION, SOLUTION INTRAVENOUS ONCE
Status: CANCELLED | OUTPATIENT
Start: 2021-06-08

## 2021-05-17 RX ORDER — ACETAMINOPHEN 325 MG/1
650 TABLET ORAL ONCE
Status: CANCELLED | OUTPATIENT
Start: 2021-06-29

## 2021-05-17 RX ORDER — ACETAMINOPHEN 325 MG/1
650 TABLET ORAL ONCE
Status: CANCELLED | OUTPATIENT
Start: 2021-06-08

## 2021-05-17 RX ORDER — ACETAMINOPHEN 325 MG/1
650 TABLET ORAL ONCE
Status: CANCELLED | OUTPATIENT
Start: 2021-05-25

## 2021-05-17 RX ORDER — SODIUM CHLORIDE 9 MG/ML
250 INJECTION, SOLUTION INTRAVENOUS ONCE
Status: CANCELLED | OUTPATIENT
Start: 2021-06-15

## 2021-05-17 NOTE — TELEPHONE ENCOUNTER
----- Message from Susan Greene sent at 5/17/2021  1:47 PM EDT -----  Injectafer denied due to preferred drugs being ferrlecit, infed, venofer

## 2021-05-18 ENCOUNTER — HOSPITAL ENCOUNTER (OUTPATIENT)
Dept: ONCOLOGY | Facility: HOSPITAL | Age: 66
Setting detail: INFUSION SERIES
Discharge: HOME | End: 2021-05-18

## 2021-05-18 ENCOUNTER — TELEPHONE (OUTPATIENT)
Dept: ONCOLOGY | Facility: CLINIC | Age: 66
End: 2021-05-18

## 2021-05-18 VITALS
BODY MASS INDEX: 38.64 KG/M2 | HEIGHT: 62 IN | HEART RATE: 52 BPM | TEMPERATURE: 97.1 F | DIASTOLIC BLOOD PRESSURE: 69 MMHG | WEIGHT: 210 LBS | SYSTOLIC BLOOD PRESSURE: 151 MMHG | RESPIRATION RATE: 20 BRPM

## 2021-05-18 DIAGNOSIS — K90.9 IRON MALABSORPTION: ICD-10-CM

## 2021-05-18 DIAGNOSIS — D50.9 IRON DEFICIENCY ANEMIA, UNSPECIFIED IRON DEFICIENCY ANEMIA TYPE: Primary | ICD-10-CM

## 2021-05-18 PROCEDURE — 96375 TX/PRO/DX INJ NEW DRUG ADDON: CPT

## 2021-05-18 PROCEDURE — 96365 THER/PROPH/DIAG IV INF INIT: CPT

## 2021-05-18 PROCEDURE — 25010000002 DIPHENHYDRAMINE PER 50 MG: Performed by: INTERNAL MEDICINE

## 2021-05-18 PROCEDURE — 25010000002 NA FERRIC GLUC CPLX PER 12.5 MG: Performed by: INTERNAL MEDICINE

## 2021-05-18 RX ORDER — FAMOTIDINE 10 MG/ML
20 INJECTION, SOLUTION INTRAVENOUS ONCE
Status: COMPLETED | OUTPATIENT
Start: 2021-05-18 | End: 2021-05-18

## 2021-05-18 RX ORDER — ACETAMINOPHEN 325 MG/1
650 TABLET ORAL ONCE
Status: COMPLETED | OUTPATIENT
Start: 2021-05-18 | End: 2021-05-18

## 2021-05-18 RX ADMIN — DIPHENHYDRAMINE HYDROCHLORIDE 25 MG: 50 INJECTION INTRAMUSCULAR; INTRAVENOUS at 13:47

## 2021-05-18 RX ADMIN — SODIUM CHLORIDE 125 MG: 9 INJECTION, SOLUTION INTRAVENOUS at 14:04

## 2021-05-18 RX ADMIN — FAMOTIDINE 20 MG: 10 INJECTION, SOLUTION INTRAVENOUS at 13:47

## 2021-05-18 RX ADMIN — ACETAMINOPHEN 650 MG: 325 TABLET ORAL at 13:36

## 2021-05-25 ENCOUNTER — APPOINTMENT (OUTPATIENT)
Dept: ONCOLOGY | Facility: HOSPITAL | Age: 66
End: 2021-05-25

## 2021-06-01 ENCOUNTER — HOSPITAL ENCOUNTER (OUTPATIENT)
Dept: ONCOLOGY | Facility: HOSPITAL | Age: 66
Setting detail: INFUSION SERIES
Discharge: HOME | End: 2021-06-01

## 2021-06-01 ENCOUNTER — TELEPHONE (OUTPATIENT)
Dept: ONCOLOGY | Facility: CLINIC | Age: 66
End: 2021-06-01

## 2021-06-01 VITALS
TEMPERATURE: 97 F | HEART RATE: 60 BPM | RESPIRATION RATE: 20 BRPM | SYSTOLIC BLOOD PRESSURE: 151 MMHG | WEIGHT: 210 LBS | HEIGHT: 62 IN | BODY MASS INDEX: 38.64 KG/M2 | DIASTOLIC BLOOD PRESSURE: 61 MMHG

## 2021-06-01 DIAGNOSIS — D50.9 IRON DEFICIENCY ANEMIA, UNSPECIFIED IRON DEFICIENCY ANEMIA TYPE: Primary | ICD-10-CM

## 2021-06-01 DIAGNOSIS — K90.9 IRON MALABSORPTION: ICD-10-CM

## 2021-06-01 PROCEDURE — 96365 THER/PROPH/DIAG IV INF INIT: CPT

## 2021-06-01 PROCEDURE — 25010000002 NA FERRIC GLUC CPLX PER 12.5 MG: Performed by: INTERNAL MEDICINE

## 2021-06-01 PROCEDURE — 96375 TX/PRO/DX INJ NEW DRUG ADDON: CPT

## 2021-06-01 PROCEDURE — 25010000002 DIPHENHYDRAMINE PER 50 MG: Performed by: INTERNAL MEDICINE

## 2021-06-01 RX ORDER — ACETAMINOPHEN 325 MG/1
650 TABLET ORAL ONCE
Status: COMPLETED | OUTPATIENT
Start: 2021-06-01 | End: 2021-06-01

## 2021-06-01 RX ORDER — SODIUM CHLORIDE 9 MG/ML
250 INJECTION, SOLUTION INTRAVENOUS ONCE
Status: COMPLETED | OUTPATIENT
Start: 2021-06-01 | End: 2021-06-01

## 2021-06-01 RX ORDER — FAMOTIDINE 10 MG/ML
20 INJECTION, SOLUTION INTRAVENOUS ONCE
Status: COMPLETED | OUTPATIENT
Start: 2021-06-01 | End: 2021-06-01

## 2021-06-01 RX ADMIN — ACETAMINOPHEN 650 MG: 325 TABLET ORAL at 14:12

## 2021-06-01 RX ADMIN — SODIUM CHLORIDE 250 ML: 9 INJECTION, SOLUTION INTRAVENOUS at 14:12

## 2021-06-01 RX ADMIN — SODIUM CHLORIDE 125 MG: 9 INJECTION, SOLUTION INTRAVENOUS at 14:39

## 2021-06-01 RX ADMIN — DIPHENHYDRAMINE HYDROCHLORIDE 25 MG: 50 INJECTION INTRAMUSCULAR; INTRAVENOUS at 14:15

## 2021-06-01 RX ADMIN — FAMOTIDINE 20 MG: 10 INJECTION, SOLUTION INTRAVENOUS at 14:15

## 2021-06-08 ENCOUNTER — HOSPITAL ENCOUNTER (OUTPATIENT)
Dept: ONCOLOGY | Facility: HOSPITAL | Age: 66
Setting detail: INFUSION SERIES
Discharge: HOME | End: 2021-06-08

## 2021-06-08 VITALS
WEIGHT: 210 LBS | SYSTOLIC BLOOD PRESSURE: 155 MMHG | TEMPERATURE: 97.7 F | BODY MASS INDEX: 38.64 KG/M2 | HEIGHT: 62 IN | RESPIRATION RATE: 18 BRPM | HEART RATE: 79 BPM | DIASTOLIC BLOOD PRESSURE: 66 MMHG

## 2021-06-08 DIAGNOSIS — D50.9 IRON DEFICIENCY ANEMIA, UNSPECIFIED IRON DEFICIENCY ANEMIA TYPE: Primary | ICD-10-CM

## 2021-06-08 DIAGNOSIS — K90.9 IRON MALABSORPTION: ICD-10-CM

## 2021-06-08 PROCEDURE — 96375 TX/PRO/DX INJ NEW DRUG ADDON: CPT

## 2021-06-08 PROCEDURE — 96365 THER/PROPH/DIAG IV INF INIT: CPT

## 2021-06-08 PROCEDURE — 25010000002 NA FERRIC GLUC CPLX PER 12.5 MG: Performed by: INTERNAL MEDICINE

## 2021-06-08 PROCEDURE — 25010000002 DIPHENHYDRAMINE PER 50 MG: Performed by: INTERNAL MEDICINE

## 2021-06-08 RX ORDER — SODIUM CHLORIDE 9 MG/ML
250 INJECTION, SOLUTION INTRAVENOUS ONCE
Status: DISCONTINUED | OUTPATIENT
Start: 2021-06-08 | End: 2021-06-09 | Stop reason: HOSPADM

## 2021-06-08 RX ORDER — FAMOTIDINE 10 MG/ML
20 INJECTION, SOLUTION INTRAVENOUS ONCE
Status: COMPLETED | OUTPATIENT
Start: 2021-06-08 | End: 2021-06-08

## 2021-06-08 RX ORDER — ACETAMINOPHEN 325 MG/1
650 TABLET ORAL ONCE
Status: COMPLETED | OUTPATIENT
Start: 2021-06-08 | End: 2021-06-08

## 2021-06-08 RX ADMIN — ACETAMINOPHEN 650 MG: 325 TABLET ORAL at 13:30

## 2021-06-08 RX ADMIN — SODIUM CHLORIDE 125 MG: 9 INJECTION, SOLUTION INTRAVENOUS at 13:36

## 2021-06-08 RX ADMIN — FAMOTIDINE 20 MG: 10 INJECTION INTRAVENOUS at 13:30

## 2021-06-08 RX ADMIN — DIPHENHYDRAMINE HYDROCHLORIDE 25 MG: 50 INJECTION INTRAMUSCULAR; INTRAVENOUS at 13:29

## 2021-06-15 ENCOUNTER — HOSPITAL ENCOUNTER (OUTPATIENT)
Dept: ONCOLOGY | Facility: HOSPITAL | Age: 66
Setting detail: INFUSION SERIES
Discharge: HOME | End: 2021-06-15

## 2021-06-15 VITALS
SYSTOLIC BLOOD PRESSURE: 134 MMHG | DIASTOLIC BLOOD PRESSURE: 74 MMHG | WEIGHT: 208 LBS | TEMPERATURE: 97.6 F | RESPIRATION RATE: 16 BRPM | HEIGHT: 62 IN | HEART RATE: 50 BPM | BODY MASS INDEX: 38.28 KG/M2

## 2021-06-15 DIAGNOSIS — K90.9 IRON MALABSORPTION: ICD-10-CM

## 2021-06-15 DIAGNOSIS — D50.9 IRON DEFICIENCY ANEMIA, UNSPECIFIED IRON DEFICIENCY ANEMIA TYPE: Primary | ICD-10-CM

## 2021-06-15 PROCEDURE — 25010000002 NA FERRIC GLUC CPLX PER 12.5 MG: Performed by: INTERNAL MEDICINE

## 2021-06-15 PROCEDURE — 25010000002 DIPHENHYDRAMINE PER 50 MG: Performed by: INTERNAL MEDICINE

## 2021-06-15 PROCEDURE — 96375 TX/PRO/DX INJ NEW DRUG ADDON: CPT

## 2021-06-15 PROCEDURE — 96365 THER/PROPH/DIAG IV INF INIT: CPT

## 2021-06-15 RX ORDER — FAMOTIDINE 10 MG/ML
20 INJECTION, SOLUTION INTRAVENOUS ONCE
Status: COMPLETED | OUTPATIENT
Start: 2021-06-15 | End: 2021-06-15

## 2021-06-15 RX ORDER — SODIUM CHLORIDE 9 MG/ML
250 INJECTION, SOLUTION INTRAVENOUS ONCE
Status: COMPLETED | OUTPATIENT
Start: 2021-06-15 | End: 2021-06-15

## 2021-06-15 RX ORDER — ACETAMINOPHEN 325 MG/1
650 TABLET ORAL ONCE
Status: COMPLETED | OUTPATIENT
Start: 2021-06-15 | End: 2021-06-15

## 2021-06-15 RX ADMIN — ACETAMINOPHEN 650 MG: 325 TABLET ORAL at 13:58

## 2021-06-15 RX ADMIN — SODIUM CHLORIDE 125 MG: 9 INJECTION, SOLUTION INTRAVENOUS at 14:29

## 2021-06-15 RX ADMIN — FAMOTIDINE 20 MG: 10 INJECTION, SOLUTION INTRAVENOUS at 13:58

## 2021-06-15 RX ADMIN — DIPHENHYDRAMINE HYDROCHLORIDE 25 MG: 50 INJECTION INTRAMUSCULAR; INTRAVENOUS at 13:59

## 2021-06-15 RX ADMIN — SODIUM CHLORIDE 250 ML: 9 INJECTION, SOLUTION INTRAVENOUS at 13:58

## 2021-06-22 ENCOUNTER — HOSPITAL ENCOUNTER (OUTPATIENT)
Dept: ONCOLOGY | Facility: HOSPITAL | Age: 66
Setting detail: INFUSION SERIES
Discharge: HOME | End: 2021-06-22

## 2021-06-22 VITALS
SYSTOLIC BLOOD PRESSURE: 170 MMHG | DIASTOLIC BLOOD PRESSURE: 71 MMHG | HEIGHT: 62 IN | TEMPERATURE: 97.5 F | WEIGHT: 211 LBS | BODY MASS INDEX: 38.83 KG/M2 | HEART RATE: 50 BPM | RESPIRATION RATE: 16 BRPM

## 2021-06-22 DIAGNOSIS — D50.9 IRON DEFICIENCY ANEMIA, UNSPECIFIED IRON DEFICIENCY ANEMIA TYPE: Primary | ICD-10-CM

## 2021-06-22 DIAGNOSIS — K90.9 IRON MALABSORPTION: ICD-10-CM

## 2021-06-22 PROCEDURE — 25010000002 NA FERRIC GLUC CPLX PER 12.5 MG: Performed by: INTERNAL MEDICINE

## 2021-06-22 PROCEDURE — 96365 THER/PROPH/DIAG IV INF INIT: CPT

## 2021-06-22 PROCEDURE — 96375 TX/PRO/DX INJ NEW DRUG ADDON: CPT

## 2021-06-22 PROCEDURE — 25010000002 DIPHENHYDRAMINE PER 50 MG: Performed by: INTERNAL MEDICINE

## 2021-06-22 RX ORDER — FAMOTIDINE 10 MG/ML
20 INJECTION, SOLUTION INTRAVENOUS ONCE
Status: COMPLETED | OUTPATIENT
Start: 2021-06-22 | End: 2021-06-22

## 2021-06-22 RX ORDER — SODIUM CHLORIDE 9 MG/ML
250 INJECTION, SOLUTION INTRAVENOUS ONCE
Status: COMPLETED | OUTPATIENT
Start: 2021-06-22 | End: 2021-06-22

## 2021-06-22 RX ORDER — ACETAMINOPHEN 325 MG/1
650 TABLET ORAL ONCE
Status: COMPLETED | OUTPATIENT
Start: 2021-06-22 | End: 2021-06-22

## 2021-06-22 RX ADMIN — SODIUM CHLORIDE 250 ML: 9 INJECTION, SOLUTION INTRAVENOUS at 13:37

## 2021-06-22 RX ADMIN — ACETAMINOPHEN 650 MG: 325 TABLET ORAL at 13:37

## 2021-06-22 RX ADMIN — SODIUM CHLORIDE 125 MG: 9 INJECTION, SOLUTION INTRAVENOUS at 13:48

## 2021-06-22 RX ADMIN — DIPHENHYDRAMINE HYDROCHLORIDE 25 MG: 50 INJECTION INTRAMUSCULAR; INTRAVENOUS at 13:37

## 2021-06-22 RX ADMIN — FAMOTIDINE 20 MG: 10 INJECTION, SOLUTION INTRAVENOUS at 13:38

## 2021-06-29 ENCOUNTER — HOSPITAL ENCOUNTER (OUTPATIENT)
Dept: ONCOLOGY | Facility: HOSPITAL | Age: 66
Setting detail: INFUSION SERIES
Discharge: HOME | End: 2021-06-29

## 2021-06-29 VITALS
BODY MASS INDEX: 38.46 KG/M2 | WEIGHT: 209 LBS | HEIGHT: 62 IN | HEART RATE: 58 BPM | SYSTOLIC BLOOD PRESSURE: 162 MMHG | RESPIRATION RATE: 16 BRPM | TEMPERATURE: 97.2 F | DIASTOLIC BLOOD PRESSURE: 58 MMHG

## 2021-06-29 DIAGNOSIS — K90.9 IRON MALABSORPTION: ICD-10-CM

## 2021-06-29 DIAGNOSIS — D50.9 IRON DEFICIENCY ANEMIA, UNSPECIFIED IRON DEFICIENCY ANEMIA TYPE: Primary | ICD-10-CM

## 2021-06-29 PROCEDURE — 96366 THER/PROPH/DIAG IV INF ADDON: CPT

## 2021-06-29 PROCEDURE — 96375 TX/PRO/DX INJ NEW DRUG ADDON: CPT

## 2021-06-29 PROCEDURE — 25010000002 DIPHENHYDRAMINE PER 50 MG: Performed by: INTERNAL MEDICINE

## 2021-06-29 PROCEDURE — 25010000002 NA FERRIC GLUC CPLX PER 12.5 MG: Performed by: INTERNAL MEDICINE

## 2021-06-29 PROCEDURE — 96376 TX/PRO/DX INJ SAME DRUG ADON: CPT

## 2021-06-29 PROCEDURE — 96365 THER/PROPH/DIAG IV INF INIT: CPT

## 2021-06-29 RX ORDER — SODIUM CHLORIDE 9 MG/ML
250 INJECTION, SOLUTION INTRAVENOUS ONCE
Status: COMPLETED | OUTPATIENT
Start: 2021-06-29 | End: 2021-06-29

## 2021-06-29 RX ORDER — FAMOTIDINE 10 MG/ML
20 INJECTION, SOLUTION INTRAVENOUS ONCE
Status: COMPLETED | OUTPATIENT
Start: 2021-06-29 | End: 2021-06-29

## 2021-06-29 RX ORDER — ACETAMINOPHEN 325 MG/1
650 TABLET ORAL ONCE
Status: COMPLETED | OUTPATIENT
Start: 2021-06-29 | End: 2021-06-29

## 2021-06-29 RX ADMIN — ACETAMINOPHEN 650 MG: 325 TABLET ORAL at 13:51

## 2021-06-29 RX ADMIN — SODIUM CHLORIDE 250 ML: 9 INJECTION, SOLUTION INTRAVENOUS at 13:55

## 2021-06-29 RX ADMIN — SODIUM CHLORIDE 125 MG: 9 INJECTION, SOLUTION INTRAVENOUS at 14:19

## 2021-06-29 RX ADMIN — DIPHENHYDRAMINE HYDROCHLORIDE 25 MG: 50 INJECTION INTRAMUSCULAR; INTRAVENOUS at 13:55

## 2021-06-29 RX ADMIN — FAMOTIDINE 20 MG: 10 INJECTION, SOLUTION INTRAVENOUS at 13:51

## 2021-11-02 RX ORDER — LEVOTHYROXINE SODIUM 112 UG/1
TABLET ORAL
Qty: 90 TABLET | Refills: 0 | Status: SHIPPED | OUTPATIENT
Start: 2021-11-02 | End: 2022-02-02

## 2021-11-10 ENCOUNTER — OFFICE VISIT (OUTPATIENT)
Dept: ONCOLOGY | Facility: CLINIC | Age: 66
End: 2021-11-10

## 2021-11-10 ENCOUNTER — LAB (OUTPATIENT)
Dept: LAB | Facility: HOSPITAL | Age: 66
End: 2021-11-10

## 2021-11-10 VITALS
HEART RATE: 54 BPM | RESPIRATION RATE: 18 BRPM | BODY MASS INDEX: 38.34 KG/M2 | DIASTOLIC BLOOD PRESSURE: 79 MMHG | WEIGHT: 216.4 LBS | SYSTOLIC BLOOD PRESSURE: 166 MMHG | HEIGHT: 63 IN | OXYGEN SATURATION: 98 % | TEMPERATURE: 98.4 F

## 2021-11-10 DIAGNOSIS — D50.9 MICROCYTIC ANEMIA: ICD-10-CM

## 2021-11-10 DIAGNOSIS — D50.9 IRON DEFICIENCY ANEMIA, UNSPECIFIED IRON DEFICIENCY ANEMIA TYPE: Primary | ICD-10-CM

## 2021-11-10 LAB
ERYTHROCYTE [DISTWIDTH] IN BLOOD BY AUTOMATED COUNT: 19.6 % (ref 12.3–15.4)
FERRITIN SERPL-MCNC: 24.47 NG/ML (ref 13–150)
HCT VFR BLD AUTO: 37.6 % (ref 34–46.6)
HGB BLD-MCNC: 11.6 G/DL (ref 12–15.9)
IRON 24H UR-MRATE: 21 MCG/DL (ref 37–145)
IRON SATN MFR SERPL: 5 % (ref 20–50)
LYMPHOCYTES # BLD AUTO: 1.9 10*3/MM3 (ref 0.7–3.1)
LYMPHOCYTES NFR BLD AUTO: 27.3 % (ref 19.6–45.3)
MCH RBC QN AUTO: 23.8 PG (ref 26.6–33)
MCHC RBC AUTO-ENTMCNC: 30.9 G/DL (ref 31.5–35.7)
MCV RBC AUTO: 77.1 FL (ref 79–97)
MONOCYTES # BLD AUTO: 0.6 10*3/MM3 (ref 0.1–0.9)
MONOCYTES NFR BLD AUTO: 9 % (ref 5–12)
NEUTROPHILS NFR BLD AUTO: 4.5 10*3/MM3 (ref 1.7–7)
NEUTROPHILS NFR BLD AUTO: 63.7 % (ref 42.7–76)
PLATELET # BLD AUTO: 324 10*3/MM3 (ref 140–450)
PMV BLD AUTO: 8.2 FL (ref 6–12)
RBC # BLD AUTO: 4.88 10*6/MM3 (ref 3.77–5.28)
TIBC SERPL-MCNC: 398 MCG/DL (ref 298–536)
TRANSFERRIN SERPL-MCNC: 267 MG/DL (ref 200–360)
WBC # BLD AUTO: 7 10*3/MM3 (ref 3.4–10.8)

## 2021-11-10 PROCEDURE — 83540 ASSAY OF IRON: CPT

## 2021-11-10 PROCEDURE — 36415 COLL VENOUS BLD VENIPUNCTURE: CPT

## 2021-11-10 PROCEDURE — 84466 ASSAY OF TRANSFERRIN: CPT

## 2021-11-10 PROCEDURE — 85025 COMPLETE CBC W/AUTO DIFF WBC: CPT

## 2021-11-10 PROCEDURE — 99213 OFFICE O/P EST LOW 20 MIN: CPT | Performed by: NURSE PRACTITIONER

## 2021-11-10 PROCEDURE — 82728 ASSAY OF FERRITIN: CPT

## 2021-11-10 NOTE — PROGRESS NOTES
DATE OF VISIT: 11/10/2021    REASON FOR VISIT: Followup for microcytic anemia     HISTORY OF PRESENT ILLNESS: The patient is a very pleasant 66 y.o. female  with past medical history significant for microcytic anemia with iron deficiency diagnosed May 2021. She presented with fatigue, shortness of breath, and blurry vision. Her hemoglobin was found to be 9.2 with MCV 72. The patient received 6 doses of IV iron replacement using Ferrlecit. The patient is here today for scheduled follow up visit with labs.     SUBJECTIVE: The patient is here today by herself. She has been feeling better, but still has some mild fatigue. Her shortness of breath has resolved. She has some mild acid reflux, but denies changes in stool color or pattern. She reports having some GI upset with IV iron infusions including nausea and upset stomach.     PAST MEDICAL HISTORY/SOCIAL HISTORY/FAMILY HISTORY: Reviewed by me and unchanged from my documentation done on 11/10/21.    Review of Systems   Constitutional: Positive for fatigue. Negative for activity change, appetite change, chills, fever and unexpected weight change.   HENT: Negative for hearing loss, mouth sores, nosebleeds, sore throat and trouble swallowing.    Eyes: Negative for visual disturbance.   Respiratory: Negative for cough, chest tightness, shortness of breath and wheezing.    Cardiovascular: Negative for chest pain, palpitations and leg swelling.   Gastrointestinal: Negative for abdominal distention, abdominal pain, blood in stool, constipation, diarrhea, nausea, rectal pain and vomiting.        Acid reflux   Endocrine: Negative for cold intolerance and heat intolerance.   Genitourinary: Negative for difficulty urinating, dysuria, frequency and urgency.   Musculoskeletal: Negative for arthralgias, back pain, gait problem, joint swelling and myalgias.   Skin: Negative for rash.   Neurological: Negative for dizziness, tremors, syncope, weakness, light-headedness, numbness and  "headaches.   Hematological: Negative for adenopathy. Does not bruise/bleed easily.   Psychiatric/Behavioral: Negative for confusion, sleep disturbance and suicidal ideas. The patient is not nervous/anxious.          Current Outpatient Medications:   •  ALPRAZolam (XANAX) 0.25 MG tablet, 0.25 mg At Night As Needed.  Take 1 tablet every day by oral route as needed., Disp: , Rfl:   •  B-D 3CC LUER-LEXX SYR 25GX1\" 25G X 1\" 3 ML misc, USE TO INJECT CYANOCOBALAMIN EVERY 30 DAYS, Disp: 1 each, Rfl: 10  •  Cholecalciferol (VITAMIN D3) 50 MCG (2000 UT) tablet, Take 1 tablet by mouth Daily., Disp: 30 tablet, Rfl: 11  •  citalopram (CeleXA) 20 MG tablet, Take 1 tablet by mouth Daily., Disp: 30 tablet, Rfl: 11  •  cyanocobalamin 1000 MCG/ML injection, ADMINISTER 1 MILLILITER INTRAMUSCULARLY ONCE MONTHLY FOR PERNICIOUS ANEMIA, Disp: 1 mL, Rfl: 11  •  estradiol (Minivelle) 0.1 MG/24HR patch, Place 1 patch on the skin as directed by provider 2 (Two) Times a Week.  Apply 1 patch twice a week by transderm. route, Disp: 8 patch, Rfl: 11  •  ferrous sulfate 325 (65 FE) MG tablet, Every 12 (Twelve) Hours. Every 12 (Twelve) Hours., Disp: , Rfl:   •  fluticasone (FLONASE) 50 MCG/ACT nasal spray, 2 sprays into the nostril(s) as directed by provider Daily., Disp: , Rfl:   •  levothyroxine (SYNTHROID, LEVOTHROID) 112 MCG tablet, TAKE ONE TABLET BY MOUTH DAILY, Disp: 90 tablet, Rfl: 0  •  loratadine (Claritin) 10 MG tablet, Take 1 tablet by mouth Daily. Take 1 tablet every day ., Disp: 30 tablet, Rfl: 11  •  metoprolol succinate XL (TOPROL-XL) 50 MG 24 hr tablet, Take 1 tablet by mouth Daily., Disp: 30 tablet, Rfl: 11    PHYSICAL EXAMINATION:   /79   Pulse 54   Temp 98.4 °F (36.9 °C)   Resp 18   Ht 160 cm (63\")   Wt 98.2 kg (216 lb 6.4 oz)   SpO2 98%   BMI 38.33 kg/m²    Pain Score    11/10/21 1321   PainSc: 0-No pain        ECOG score: 0        ECOG Performance Status: 1 - Symptomatic but completely ambulatory  General " Appearance:  alert, cooperative, no apparent distress and appears stated age   Neurologic/Psychiatric: A&O x 3, gait steady, appropriate affect, strength 5/5 in all muscle groups   HEENT:  Normocephalic, without obvious abnormality, mucous membranes moist   Neck: Supple, symmetrical, trachea midline, no adenopathy;  No thyromegaly, masses, or tenderness   Lungs:   Clear to auscultation bilaterally; respirations regular, even, and unlabored bilaterally   Heart:  Regular rate and rhythm, no murmurs appreciated   Abdomen:   Soft, non-tender, non-distended and no organomegaly   Lymph nodes: No cervical, supraclavicular, inguinal or axillary adenopathy noted   Extremities: Normal, atraumatic; no clubbing, cyanosis, or edema    Skin: No rashes, ulcers, or suspicious lesions noted     Lab on 11/10/2021   Component Date Value Ref Range Status   • WBC 11/10/2021 7.00  3.40 - 10.80 10*3/mm3 Final   • RBC 11/10/2021 4.88  3.77 - 5.28 10*6/mm3 Final   • Hemoglobin 11/10/2021 11.6* 12.0 - 15.9 g/dL Final   • Hematocrit 11/10/2021 37.6  34.0 - 46.6 % Final   • RDW 11/10/2021 19.6* 12.3 - 15.4 % Final   • MCV 11/10/2021 77.1* 79.0 - 97.0 fL Final   • MCH 11/10/2021 23.8* 26.6 - 33.0 pg Final   • MCHC 11/10/2021 30.9* 31.5 - 35.7 g/dL Final   • MPV 11/10/2021 8.2  6.0 - 12.0 fL Final   • Platelets 11/10/2021 324  140 - 450 10*3/mm3 Final   • Neutrophil % 11/10/2021 63.7  42.7 - 76.0 % Final   • Lymphocyte % 11/10/2021 27.3  19.6 - 45.3 % Final   • Monocyte % 11/10/2021 9.0  5.0 - 12.0 % Final   • Neutrophils, Absolute 11/10/2021 4.50  1.70 - 7.00 10*3/mm3 Final   • Lymphocytes, Absolute 11/10/2021 1.90  0.70 - 3.10 10*3/mm3 Final   • Monocytes, Absolute 11/10/2021 0.60  0.10 - 0.90 10*3/mm3 Final        No results found.    ASSESSMENT: The patient is a very pleasant 66 y.o. female  with microcytic anemia.     PROBLEM LIST:  1.  Anemia  2.  Microcytosis:  A.  Negative EGD and colonoscopy done by Dr. Andrade November 2018  3.   Hypothyroid  4.  Hypertension  5.  Depression     PLAN:  1. I reviewed the lab results from today. The patient still has mild anemia with hemoglobin of 11.6, with ongoing microcytosis with MCV of 77. Her WBC and platelet count are within normal limits. Her iron studies are pending.   2. We will go ahead and submit for authorization for Injectafer to be given x 2 doses as the patient has nausea with Ferrlecit infusions with persistent iron deficiency. We plan to treat her next week.   3. Due to the patient's persistent microcytic anemia with acid reflux, we will go ahead and refer her back to Dr. Andrade for further GI evaluation including EGD and possible pill cam. Her last colonoscopy was in 2018 and was normal.   4. We will see the patient back in 6 months with repeat labs including CBC, ferritin, and iron profile.   5. She will continue levothyroxine 112 mcg daily for hypothyroidism.   6. She will continue citalopram 20 mg daily for depression.   7. She will continue follow up with FL[facility] Roldan, for primary care.      Yuliana Wood, APRN  11/10/2021

## 2021-11-15 ENCOUNTER — TRANSCRIBE ORDERS (OUTPATIENT)
Dept: ADMINISTRATIVE | Facility: HOSPITAL | Age: 66
End: 2021-11-15

## 2021-11-15 DIAGNOSIS — F41.9 ANXIETY: Primary | ICD-10-CM

## 2021-11-15 DIAGNOSIS — Z12.31 VISIT FOR SCREENING MAMMOGRAM: Primary | ICD-10-CM

## 2021-11-15 RX ORDER — ALPRAZOLAM 0.25 MG/1
0.25 TABLET ORAL NIGHTLY PRN
Qty: 14 TABLET | Refills: 0 | Status: SHIPPED | OUTPATIENT
Start: 2021-11-15 | End: 2022-10-11 | Stop reason: SDUPTHER

## 2021-11-15 RX ORDER — ALPRAZOLAM 0.25 MG/1
0.25 TABLET ORAL NIGHTLY PRN
Status: CANCELLED | OUTPATIENT
Start: 2021-11-15

## 2021-11-15 NOTE — TELEPHONE ENCOUNTER
Caller: Jeane Martines ROXANA    Relationship: Self    Best call back number:     Requested Prescriptions:   Requested Prescriptions     Pending Prescriptions Disp Refills   • ALPRAZolam (Xanax) 0.25 MG tablet       Si tablet At Night As Needed.  Take 1 tablet every day by oral route as needed.        Pharmacy where request should be sent:  [facility]    Additional details provided by patient: PATIENT HAS AN APPT [id number] AND IS OUT OF MEDICATION    Does the patient have less than a 3 day supply:  [x] Yes  [] No    Sumi Alanis Rep   11/15/21 11:16 EST

## 2021-11-16 DIAGNOSIS — D50.9 IRON DEFICIENCY ANEMIA, UNSPECIFIED IRON DEFICIENCY ANEMIA TYPE: Primary | ICD-10-CM

## 2021-11-16 DIAGNOSIS — K90.9 IRON MALABSORPTION: ICD-10-CM

## 2021-11-16 RX ORDER — SODIUM CHLORIDE 9 MG/ML
250 INJECTION, SOLUTION INTRAVENOUS ONCE
Status: CANCELLED | OUTPATIENT
Start: 2021-11-24

## 2021-11-16 RX ORDER — DIPHENHYDRAMINE HCL 25 MG
25 CAPSULE ORAL ONCE
Status: CANCELLED | OUTPATIENT
Start: 2021-11-24

## 2021-11-16 RX ORDER — FAMOTIDINE 10 MG/ML
20 INJECTION, SOLUTION INTRAVENOUS ONCE
Status: CANCELLED | OUTPATIENT
Start: 2021-11-24

## 2021-11-16 RX ORDER — DIPHENHYDRAMINE HCL 25 MG
25 CAPSULE ORAL ONCE
Status: CANCELLED | OUTPATIENT
Start: 2021-11-17

## 2021-11-16 RX ORDER — FAMOTIDINE 10 MG/ML
20 INJECTION, SOLUTION INTRAVENOUS ONCE
Status: CANCELLED | OUTPATIENT
Start: 2021-11-17

## 2021-11-16 RX ORDER — SODIUM CHLORIDE 9 MG/ML
250 INJECTION, SOLUTION INTRAVENOUS ONCE
Status: CANCELLED | OUTPATIENT
Start: 2021-11-17

## 2021-11-17 ENCOUNTER — HOSPITAL ENCOUNTER (OUTPATIENT)
Dept: ONCOLOGY | Facility: HOSPITAL | Age: 66
Setting detail: INFUSION SERIES
Discharge: HOME | End: 2021-11-17

## 2021-11-17 VITALS
HEIGHT: 63 IN | DIASTOLIC BLOOD PRESSURE: 75 MMHG | WEIGHT: 216 LBS | BODY MASS INDEX: 38.27 KG/M2 | TEMPERATURE: 97.9 F | RESPIRATION RATE: 18 BRPM | HEART RATE: 65 BPM | SYSTOLIC BLOOD PRESSURE: 179 MMHG

## 2021-11-17 DIAGNOSIS — K90.9 IRON MALABSORPTION: ICD-10-CM

## 2021-11-17 DIAGNOSIS — D50.9 IRON DEFICIENCY ANEMIA, UNSPECIFIED IRON DEFICIENCY ANEMIA TYPE: Primary | ICD-10-CM

## 2021-11-17 PROCEDURE — 63710000001 DIPHENHYDRAMINE PER 50 MG: Performed by: NURSE PRACTITIONER

## 2021-11-17 PROCEDURE — 96365 THER/PROPH/DIAG IV INF INIT: CPT

## 2021-11-17 PROCEDURE — 96374 THER/PROPH/DIAG INJ IV PUSH: CPT

## 2021-11-17 PROCEDURE — 96375 TX/PRO/DX INJ NEW DRUG ADDON: CPT

## 2021-11-17 PROCEDURE — 25010000002 FERRIC CARBOXYMALTOSE 750 MG/15ML SOLUTION 15 ML VIAL: Performed by: NURSE PRACTITIONER

## 2021-11-17 RX ORDER — SODIUM CHLORIDE 9 MG/ML
250 INJECTION, SOLUTION INTRAVENOUS ONCE
Status: COMPLETED | OUTPATIENT
Start: 2021-11-17 | End: 2021-11-17

## 2021-11-17 RX ORDER — FAMOTIDINE 10 MG/ML
20 INJECTION, SOLUTION INTRAVENOUS ONCE
Status: COMPLETED | OUTPATIENT
Start: 2021-11-17 | End: 2021-11-17

## 2021-11-17 RX ORDER — DIPHENHYDRAMINE HCL 25 MG
25 CAPSULE ORAL ONCE
Status: COMPLETED | OUTPATIENT
Start: 2021-11-17 | End: 2021-11-17

## 2021-11-17 RX ADMIN — SODIUM CHLORIDE 250 ML: 9 INJECTION, SOLUTION INTRAVENOUS at 15:04

## 2021-11-17 RX ADMIN — FERRIC CARBOXYMALTOSE INJECTION 750 MG: 50 INJECTION, SOLUTION INTRAVENOUS at 15:08

## 2021-11-17 RX ADMIN — DIPHENHYDRAMINE HYDROCHLORIDE 25 MG: 25 CAPSULE ORAL at 15:01

## 2021-11-17 RX ADMIN — FAMOTIDINE 20 MG: 10 INJECTION, SOLUTION INTRAVENOUS at 15:03

## 2021-11-17 NOTE — ADDENDUM NOTE
Encounter addended by: Kristi De Guzman RN on: 11/17/2021 4:16 PM   Actions taken: Charge Capture section accepted, Flowsheet accepted

## 2021-11-23 ENCOUNTER — OFFICE VISIT (OUTPATIENT)
Dept: FAMILY MEDICINE CLINIC | Facility: CLINIC | Age: 66
End: 2021-11-23

## 2021-11-23 ENCOUNTER — HOSPITAL ENCOUNTER (OUTPATIENT)
Dept: MAMMOGRAPHY | Facility: HOSPITAL | Age: 66
Discharge: HOME | End: 2021-11-23
Admitting: PHYSICIAN ASSISTANT

## 2021-11-23 VITALS
SYSTOLIC BLOOD PRESSURE: 134 MMHG | OXYGEN SATURATION: 99 % | WEIGHT: 215 LBS | HEART RATE: 83 BPM | HEIGHT: 63 IN | TEMPERATURE: 97.2 F | DIASTOLIC BLOOD PRESSURE: 70 MMHG | BODY MASS INDEX: 38.09 KG/M2

## 2021-11-23 DIAGNOSIS — I10 ESSENTIAL HYPERTENSION: Primary | ICD-10-CM

## 2021-11-23 DIAGNOSIS — Z12.31 VISIT FOR SCREENING MAMMOGRAM: ICD-10-CM

## 2021-11-23 DIAGNOSIS — F41.1 GAD (GENERALIZED ANXIETY DISORDER): ICD-10-CM

## 2021-11-23 DIAGNOSIS — J30.9 ALLERGIC RHINITIS, UNSPECIFIED SEASONALITY, UNSPECIFIED TRIGGER: ICD-10-CM

## 2021-11-23 DIAGNOSIS — Z23 IMMUNIZATION DUE: ICD-10-CM

## 2021-11-23 PROCEDURE — 77067 SCR MAMMO BI INCL CAD: CPT | Performed by: RADIOLOGY

## 2021-11-23 PROCEDURE — 77063 BREAST TOMOSYNTHESIS BI: CPT | Performed by: RADIOLOGY

## 2021-11-23 PROCEDURE — 77067 SCR MAMMO BI INCL CAD: CPT

## 2021-11-23 PROCEDURE — 99213 OFFICE O/P EST LOW 20 MIN: CPT | Performed by: PHYSICIAN ASSISTANT

## 2021-11-23 PROCEDURE — 77063 BREAST TOMOSYNTHESIS BI: CPT

## 2021-11-23 RX ORDER — METOPROLOL SUCCINATE 50 MG/1
50 TABLET, EXTENDED RELEASE ORAL DAILY
Qty: 30 TABLET | Refills: 11 | Status: SHIPPED | OUTPATIENT
Start: 2021-11-23 | End: 2022-10-11 | Stop reason: SDUPTHER

## 2021-11-23 RX ORDER — LORATADINE 10 MG/1
10 TABLET ORAL DAILY
Qty: 30 TABLET | Refills: 11 | Status: SHIPPED | OUTPATIENT
Start: 2021-11-23 | End: 2022-02-24 | Stop reason: SDUPTHER

## 2021-11-23 NOTE — PROGRESS NOTES
Subjective   Jeane Martines is a 66 y.o. female  Anxiety (follow up on anxiety, req refill on alprazolam ) and Med Refill (med refill on claritan and metoprolol)      History of Present Illness  Patient is a pleasant 66-year-old female who is seen today evaluated for follow-up on generalized anxiety disorder, allergic rhinitis, seasonal and hypertension.  All 3 medical conditions are currently stable medication due for refills.  No adverse effects noted from current medications.  The following portions of the patient's history were reviewed and updated as appropriate: allergies, current medications, past social history and problem list    Review of Systems   Constitutional: Negative for appetite change, fatigue and unexpected weight change.   HENT: Positive for congestion ( Stable on medication).    Respiratory: Negative for cough, chest tightness and shortness of breath.    Cardiovascular: Negative for chest pain, palpitations and leg swelling.   Gastrointestinal: Negative for abdominal pain, diarrhea and nausea.   Skin: Negative for color change and rash.   Neurological: Negative for dizziness, tremors, syncope, weakness, light-headedness and headaches.   Psychiatric/Behavioral: Positive for sleep disturbance. Negative for agitation, behavioral problems, confusion, decreased concentration, dysphoric mood, hallucinations, self-injury and suicidal ideas. The patient is nervous/anxious. The patient is not hyperactive.         Anxiety and insomnia stable on medication       Objective     Vitals:    11/23/21 1335   BP: 134/70   Pulse: 83   Temp: 97.2 °F (36.2 °C)   SpO2: 99%       Physical Exam  Vitals and nursing note reviewed.   Constitutional:       General: She is not in acute distress.     Appearance: Normal appearance. She is well-developed. She is not ill-appearing, toxic-appearing or diaphoretic.   HENT:      Head: Normocephalic and atraumatic.   Neck:      Vascular: No JVD.   Cardiovascular:      Rate and  Rhythm: Normal rate and regular rhythm.      Heart sounds: Normal heart sounds. No murmur heard.      Pulmonary:      Effort: Pulmonary effort is normal. No respiratory distress.      Breath sounds: Normal breath sounds.   Chest:      Chest wall: No tenderness.   Abdominal:      General: There is no distension.      Palpations: Abdomen is soft.      Tenderness: There is no abdominal tenderness.   Skin:     General: Skin is warm and dry.      Coloration: Skin is not pale.      Findings: No erythema.   Neurological:      Mental Status: She is alert and oriented to person, place, and time.   Psychiatric:         Mood and Affect: Mood normal.         Behavior: Behavior normal.         Thought Content: Thought content normal.         Judgment: Judgment normal.         Assessment/Plan     Diagnoses and all orders for this visit:    1. Essential hypertension (Primary)    2. Allergic rhinitis, unspecified seasonality, unspecified trigger    3. INO (generalized anxiety disorder)    4. Immunization due    Other orders  -     metoprolol succinate XL (TOPROL-XL) 50 MG 24 hr tablet; Take 1 tablet by mouth Daily.  Dispense: 30 tablet; Refill: 11  -     loratadine (Claritin) 10 MG tablet; Take 1 tablet by mouth Daily. Take 1 tablet every day .  Dispense: 30 tablet; Refill: 11    Refill given of Xanax 0.25 mg 1 nightly #30 with 5 refills.  Continue on Celexa at current dosage.  Follow-up in 6 months for recheck    As part of this patient's treatment plan, patient will be prescribed controlled substances. The patient has been made aware of appropriate use of such medications, including potential risk of somnolence, limited ability to drive and /or work safely, and potential for dependence or overdose. It has also been made clear that these medications are for use by this patient only, without concomitant use of alcohol or other substances unless prescribed.Controlled substance status of medication discussed with patient, discussed  risks of medication including abuse potential and diversion potential and need to follow up for reevaluation appointment in order to receive further refills.    Part of this note may be an electronic transcription/translation of spoken language to printed text using the Dragon Dictation System.

## 2021-11-24 ENCOUNTER — HOSPITAL ENCOUNTER (OUTPATIENT)
Dept: ONCOLOGY | Facility: HOSPITAL | Age: 66
Setting detail: INFUSION SERIES
Discharge: HOME | End: 2021-11-24

## 2021-11-24 VITALS
HEART RATE: 63 BPM | BODY MASS INDEX: 38.09 KG/M2 | DIASTOLIC BLOOD PRESSURE: 74 MMHG | RESPIRATION RATE: 16 BRPM | TEMPERATURE: 97.8 F | HEIGHT: 63 IN | SYSTOLIC BLOOD PRESSURE: 137 MMHG | WEIGHT: 215 LBS

## 2021-11-24 DIAGNOSIS — D50.9 IRON DEFICIENCY ANEMIA, UNSPECIFIED IRON DEFICIENCY ANEMIA TYPE: Primary | ICD-10-CM

## 2021-11-24 DIAGNOSIS — K90.9 IRON MALABSORPTION: ICD-10-CM

## 2021-11-24 PROCEDURE — 96375 TX/PRO/DX INJ NEW DRUG ADDON: CPT

## 2021-11-24 PROCEDURE — 96374 THER/PROPH/DIAG INJ IV PUSH: CPT

## 2021-11-24 PROCEDURE — 63710000001 DIPHENHYDRAMINE PER 50 MG: Performed by: NURSE PRACTITIONER

## 2021-11-24 PROCEDURE — 25010000002 ONDANSETRON PER 1 MG: Performed by: INTERNAL MEDICINE

## 2021-11-24 PROCEDURE — 25010000002 FERRIC CARBOXYMALTOSE 750 MG/15ML SOLUTION 15 ML VIAL: Performed by: NURSE PRACTITIONER

## 2021-11-24 RX ORDER — FAMOTIDINE 10 MG/ML
20 INJECTION, SOLUTION INTRAVENOUS ONCE
Status: COMPLETED | OUTPATIENT
Start: 2021-11-24 | End: 2021-11-24

## 2021-11-24 RX ORDER — DIPHENHYDRAMINE HCL 25 MG
25 CAPSULE ORAL ONCE
Status: COMPLETED | OUTPATIENT
Start: 2021-11-24 | End: 2021-11-24

## 2021-11-24 RX ORDER — ONDANSETRON 2 MG/ML
8 INJECTION INTRAMUSCULAR; INTRAVENOUS ONCE
Status: COMPLETED | OUTPATIENT
Start: 2021-11-24 | End: 2021-11-24

## 2021-11-24 RX ORDER — SODIUM CHLORIDE 9 MG/ML
250 INJECTION, SOLUTION INTRAVENOUS ONCE
Status: COMPLETED | OUTPATIENT
Start: 2021-11-24 | End: 2021-11-24

## 2021-11-24 RX ADMIN — FERRIC CARBOXYMALTOSE INJECTION 750 MG: 50 INJECTION, SOLUTION INTRAVENOUS at 15:09

## 2021-11-24 RX ADMIN — ONDANSETRON 8 MG: 2 INJECTION INTRAMUSCULAR; INTRAVENOUS at 16:07

## 2021-11-24 RX ADMIN — FAMOTIDINE 20 MG: 10 INJECTION, SOLUTION INTRAVENOUS at 14:46

## 2021-11-24 RX ADMIN — SODIUM CHLORIDE 250 ML: 9 INJECTION, SOLUTION INTRAVENOUS at 14:46

## 2021-11-24 RX ADMIN — DIPHENHYDRAMINE HYDROCHLORIDE 25 MG: 25 CAPSULE ORAL at 14:45

## 2022-01-05 RX ORDER — ESTRADIOL 0.1 MG/D
1 FILM, EXTENDED RELEASE TRANSDERMAL 2 TIMES WEEKLY
Qty: 8 PATCH | Refills: 0 | Status: SHIPPED | OUTPATIENT
Start: 2022-01-06 | End: 2022-03-08 | Stop reason: SDUPTHER

## 2022-02-02 RX ORDER — LEVOTHYROXINE SODIUM 112 UG/1
TABLET ORAL
Qty: 90 TABLET | Refills: 0 | Status: SHIPPED | OUTPATIENT
Start: 2022-02-02 | End: 2022-03-08 | Stop reason: SDUPTHER

## 2022-02-16 NOTE — TELEPHONE ENCOUNTER
----- Message from Mable Lamar RN sent at 6/1/2021  2:35 PM EDT -----  Regarding: DR MENARD-PTS TREATMENT PLAN  Here for charo today.  Today's dose is number 2.  She missed her appointment on 5/25/2021.  Can you please check the plan and change the future dates for her treatment.  The one I released today was the one for 5/25/2021.  She has an appt on 6/8, 6/15, 6/22, and 6/29.    Thank  you  Mable   [id number]     Anticoagulation Therapy Entered On:  5/17/2019 5:20 PM CDT    Performed On:  5/17/2019 5:18 PM CDT by Mackenzie Quintana RN               Warfarin Management   Week 1 Sunday Dose :   3    Week 1 Monday Dose :   1.5    Week 1 Tuesday Dose :   3    Week 1 Wednesday Dose :   1.5    Week 1 Thursday Dose :   3    Week 1 Friday Dose :   1.5    Week 1 Saturday Dose :   3    Week 1 Dose Total :   16.5    Week 2 Sunday Dose :   3    Week 2 Monday Dose :   1.5    Week 2 Tuesday Dose :   3    Week 2 Wednesday Dose :   1.5    Week 2 Thursday Dose :   3    Week 2 Friday Dose :   1.5    Week 2 Saturday Dose :   3    Week 2 Dose Total :   16.5    Anticoagulation Goal :    Lab Test performed by:  [facility]  [facility]  [place] [address]   Phone # [phone]  Fax # [phone]  Lab Draw   Planned Duration :   Indefinite   Indication :   Atrial fibrillation   International Normalization Ratio :   2.0    INR Range :   2.0 - 3.0   INR Therapeutic Range :   Yes   Anticoagulation Recheck :   4 weeks   Warfarin Physician :   Shravan Berrios MD Special Instructions :   INR = 2.0 per [facility] on 5/17/19. Patient is to take  1.5mg warfarin on Mon, Wed, and Fri and 3mg the rest of the days of the week recheck INR in 4 weeks per [facility] Patient advised via call to home with message left.   Mackenzie Quintana RN - 5/17/2019 5:18 PM CDT

## 2022-02-25 RX ORDER — LORATADINE 10 MG/1
10 TABLET ORAL DAILY
Qty: 30 TABLET | Refills: 11 | Status: SHIPPED | OUTPATIENT
Start: 2022-02-25

## 2022-03-08 ENCOUNTER — OFFICE VISIT (OUTPATIENT)
Dept: FAMILY MEDICINE CLINIC | Facility: CLINIC | Age: 67
End: 2022-03-08

## 2022-03-08 VITALS
HEIGHT: 63 IN | DIASTOLIC BLOOD PRESSURE: 88 MMHG | OXYGEN SATURATION: 99 % | TEMPERATURE: 97.2 F | SYSTOLIC BLOOD PRESSURE: 138 MMHG | WEIGHT: 216 LBS | HEART RATE: 79 BPM | BODY MASS INDEX: 38.27 KG/M2

## 2022-03-08 DIAGNOSIS — I10 ESSENTIAL HYPERTENSION: ICD-10-CM

## 2022-03-08 DIAGNOSIS — E03.9 ACQUIRED HYPOTHYROIDISM: ICD-10-CM

## 2022-03-08 DIAGNOSIS — E53.8 B12 DEFICIENCY: ICD-10-CM

## 2022-03-08 DIAGNOSIS — Z23 IMMUNIZATION DUE: ICD-10-CM

## 2022-03-08 DIAGNOSIS — Z00.00 MEDICARE ANNUAL WELLNESS VISIT, SUBSEQUENT: Primary | ICD-10-CM

## 2022-03-08 DIAGNOSIS — D50.9 MICROCYTIC ANEMIA: ICD-10-CM

## 2022-03-08 DIAGNOSIS — F41.1 GAD (GENERALIZED ANXIETY DISORDER): ICD-10-CM

## 2022-03-08 DIAGNOSIS — E55.9 VITAMIN D DEFICIENCY: ICD-10-CM

## 2022-03-08 PROCEDURE — G0439 PPPS, SUBSEQ VISIT: HCPCS | Performed by: PHYSICIAN ASSISTANT

## 2022-03-08 PROCEDURE — 1126F AMNT PAIN NOTED NONE PRSNT: CPT | Performed by: PHYSICIAN ASSISTANT

## 2022-03-08 PROCEDURE — 1170F FXNL STATUS ASSESSED: CPT | Performed by: PHYSICIAN ASSISTANT

## 2022-03-08 PROCEDURE — 1159F MED LIST DOCD IN RCRD: CPT | Performed by: PHYSICIAN ASSISTANT

## 2022-03-08 PROCEDURE — 96160 PT-FOCUSED HLTH RISK ASSMT: CPT | Performed by: PHYSICIAN ASSISTANT

## 2022-03-08 RX ORDER — SYRINGE WITH NEEDLE, 1 ML 25GX5/8"
SYRINGE, EMPTY DISPOSABLE MISCELLANEOUS
Qty: 1 EACH | Refills: 11 | Status: SHIPPED | OUTPATIENT
Start: 2022-03-08

## 2022-03-08 RX ORDER — CYANOCOBALAMIN 1000 UG/ML
INJECTION, SOLUTION INTRAMUSCULAR; SUBCUTANEOUS
Qty: 1 ML | Refills: 11 | Status: SHIPPED | OUTPATIENT
Start: 2022-03-08

## 2022-03-08 RX ORDER — CITALOPRAM 20 MG/1
20 TABLET ORAL DAILY
Qty: 30 TABLET | Refills: 11 | Status: SHIPPED | OUTPATIENT
Start: 2022-03-08 | End: 2022-03-08 | Stop reason: SDUPTHER

## 2022-03-08 RX ORDER — ESTRADIOL 0.1 MG/D
1 FILM, EXTENDED RELEASE TRANSDERMAL 2 TIMES WEEKLY
Qty: 24 PATCH | Refills: 3 | Status: SHIPPED | OUTPATIENT
Start: 2022-03-10 | End: 2022-10-11

## 2022-03-08 RX ORDER — LEVOTHYROXINE SODIUM 112 UG/1
112 TABLET ORAL DAILY
Qty: 90 TABLET | Refills: 3 | Status: SHIPPED | OUTPATIENT
Start: 2022-03-08 | End: 2022-03-11 | Stop reason: DRUGHIGH

## 2022-03-08 RX ORDER — CITALOPRAM 20 MG/1
20 TABLET ORAL DAILY
Qty: 90 TABLET | Refills: 3 | Status: SHIPPED | OUTPATIENT
Start: 2022-03-08 | End: 2023-03-08

## 2022-03-08 NOTE — PROGRESS NOTES
"The ABCs of the Annual Wellness Visit  Subsequent Medicare Wellness Visit    Chief Complaint   Patient presents with   • Welcome To Medicare     Welcome to Medicare Wellness visit    • Med Refill     Med refill on alprazolam, estradiol, levothyroxine, b12 with syringes and celexa      Subjective   History of Present Illness:  Jeane Martines is a 66 y.o. female who presents for a Subsequent Medicare Wellness Visit.    The patient consents to being recorded using [employer] services.     Patient is a very pleasant 66-year-old female comes in today for annual wellness and follow-up on hypothyroidism, B12 deficiency associated with pernicious anemia, hormonal replacement therapy secondary to menopause and generalized anxiety disorder.           The patient reports she is tolerating her medications well and does not need any doses adjusted. She is able to sleep well at night and is handling stress well. She \"keeps herself busy\" and picks her granddaughter up from school. She contracted COVID-29 after Marg[place] and states she recovered well. The patient obtained her mammogram 11/2021 and revealed normal results.     The patient reports she continues to see her hematologist Dr. Aden for IV infusions. The patient reports she received a new kind of iron infusion at her last visit and experienced an adverse reaction and passed out. The patient states she \"feels much better\" because of the infusions. She has a follow-up appointment to see her hematologist on 05/2022. The patient is scheduled to obtain a repeat colonoscopy and an EGD with Dr. Crystal. Her breathing has improved and she no longer experiences dizziness or lightheadedness. The patient plans on exercising. She denies bone fractures and other complications related. Patient will not obtain DEXA scan at this time. She does not have an advance directive or living will and declined additional information. She continues to take her thyroid medication. Her bowels and bladder " "function are normal. She denies abnormal moles or rashes on her skin. There are no complications with her feet.     The patient reports she is currently unemployed.     The patient has received her COVID-19 vaccination and received the booster after she contracted COVID-19. She experienced side effects from the booster and states it felt like \"she had COVDI-19 all over again\".     The following portions of the patient's history were reviewed and   updated as appropriate: allergies, current medications, past medical history, past social history, past surgical history and problem list.    Compared to one year ago, the patient feels her physical   health is the same.    Compared to one year ago, the patient feels her mental   health is the same.    Recent Hospitalizations:  She was not admitted to the hospital during the last year.       Current Medical Providers:  Patient Care Team:  Vijaya Figueroa PA-C as PCP - General (Family Medicine)    Outpatient Medications Prior to Visit   Medication Sig Dispense Refill   • ALPRAZolam (Xanax) 0.25 MG tablet Take 1 tablet by mouth At Night As Needed for Anxiety or Sleep.  Take 1 tablet every day by oral route as needed. 14 tablet 0   • Cholecalciferol (VITAMIN D3) 50 MCG (2000 UT) tablet Take 1 tablet by mouth Daily. 30 tablet 11   • fluticasone (FLONASE) 50 MCG/ACT nasal spray 2 sprays into the nostril(s) as directed by provider Daily.     • loratadine (Claritin) 10 MG tablet Take 1 tablet by mouth Daily. Take 1 tablet every day . 30 tablet 11   • metoprolol succinate XL (TOPROL-XL) 50 MG 24 hr tablet Take 1 tablet by mouth Daily. 30 tablet 11   • B-D 3CC LUER-LEXX SYR 25GX1\" 25G X 1\" 3 ML misc USE TO INJECT CYANOCOBALAMIN EVERY 30 DAYS 1 each 10   • citalopram (CeleXA) 20 MG tablet Take 1 tablet by mouth Daily. 30 tablet 11   • cyanocobalamin 1000 MCG/ML injection ADMINISTER 1 MILLILITER INTRAMUSCULARLY ONCE MONTHLY FOR PERNICIOUS ANEMIA 1 mL 11   • levothyroxine " "(SYNTHROID, LEVOTHROID) 112 MCG tablet TAKE ONE TABLET BY MOUTH DAILY 90 tablet 0   • estradiol (Minivelle) 0.1 MG/24HR patch Place 1 patch on the skin as directed by provider 2 (Two) Times a Week for 30 days.  Apply 1 patch twice a week by transderm. route 8 patch 0   • ferrous sulfate 325 (65 FE) MG tablet Every 12 (Twelve) Hours. Every 12 (Twelve) Hours.       No facility-administered medications prior to visit.       No opioid medication identified on active medication list. I have reviewed chart for other potential  high risk medication/s and harmful drug interactions in the elderly.          Aspirin is not on active medication list.  Aspirin use is not indicated based on review of current medical condition/s. Risk of harm outweighs potential benefits.  .    Patient Active Problem List   Diagnosis   • Essential hypertension   • Hypothyroidism   • Allergic rhinitis   • Anxiety   • B12 deficiency   • Iron deficiency   • Microcytic anemia   • Iron deficiency anemia   • Iron malabsorption     Advance Care Planning  has NO advanced directive - not interested in additional information    Review of Systems   Constitutional: Negative for fatigue and unexpected weight change.   Respiratory: Negative for cough, chest tightness and shortness of breath.    Cardiovascular: Negative for chest pain, palpitations and leg swelling.   Gastrointestinal: Negative for nausea.   Skin: Negative for color change and rash.        Hair loss   Neurological: Negative for dizziness, syncope, weakness and headaches.   Psychiatric/Behavioral: The patient is nervous/anxious ( stable on meds).          Objective      Vitals:    03/08/22 1416   BP: 138/88   Pulse: 79   Temp: 97.2 °F (36.2 °C)   SpO2: 99%   Weight: 98 kg (216 lb)   Height: 160 cm (63\")   PainSc: 0-No pain     BMI Readings from Last 1 Encounters:   03/08/22 38.26 kg/m²   BMI is above normal parameters. Recommendations include: exercise counseling    Does the patient have evidence " of cognitive impairment? No    Physical Exam  Vitals and nursing note reviewed.   Constitutional:       General: She is not in acute distress.     Appearance: Normal appearance. She is well-developed. She is not ill-appearing, toxic-appearing or diaphoretic.   Neck:      Vascular: No carotid bruit or JVD.   Cardiovascular:      Rate and Rhythm: Normal rate and regular rhythm.      Pulses: Normal pulses.      Heart sounds: Normal heart sounds. No murmur heard.  Pulmonary:      Effort: Pulmonary effort is normal. No respiratory distress.      Breath sounds: Normal breath sounds.   Abdominal:      Palpations: Abdomen is soft.      Tenderness: There is no abdominal tenderness.   Skin:     General: Skin is warm and dry.   Neurological:      Mental Status: She is alert.                 HEALTH RISK ASSESSMENT    Smoking Status:  Social History     Tobacco Use   Smoking Status Never Smoker   Smokeless Tobacco Never Used     Alcohol Consumption:  Social History     Substance and Sexual Activity   Alcohol Use No     Fall Risk Screen:    STEADI Fall Risk Assessment was completed, and patient is at LOW risk for falls.Assessment completed on:3/8/2022    Depression Screening:  PHQ-2/PHQ-9 Depression Screening 3/8/2022   Little Interest or Pleasure in Doing Things 0-->not at all   Feeling Down, Depressed or Hopeless 0-->not at all   PHQ-9: Brief Depression Severity Measure Score 0       Health Habits and Functional and Cognitive Screening:  Functional & Cognitive Status 3/8/2022   Do you have difficulty preparing food and eating? No   Do you have difficulty bathing yourself, getting dressed or grooming yourself? No   Do you have difficulty using the toilet? No   Do you have difficulty moving around from place to place? No   Do you have trouble with steps or getting out of a bed or a chair? No   Current Diet Well Balanced Diet   Dental Exam Up to date   Eye Exam Up to date   Exercise (times per week) 0 times per week   Current  Exercises Include No Regular Exercise   Do you need help using the phone?  No   Are you deaf or do you have serious difficulty hearing?  No   Do you need help with transportation? No   Do you need help shopping? No   Do you need help preparing meals?  No   Do you need help with housework?  No   Do you need help with laundry? No   Do you need help taking your medications? No   Do you need help managing money? No   Do you ever drive or ride in a car without wearing a seat belt? No   Have you felt unusual stress, anger or loneliness in the last month? No   Who do you live with? Spouse   If you need help, do you have trouble finding someone available to you? No   Have you been bothered in the last four weeks by sexual problems? No   Do you have difficulty concentrating, remembering or making decisions? No       Age-appropriate Screening Schedule:  Refer to the list below for future screening recommendations based on patient's age, sex and/or medical conditions. Orders for these recommended tests are listed in the plan section. The patient has been provided with a written plan.    Health Maintenance   Topic Date Due   • DXA SCAN  03/08/2022 (Originally 1955)   • ZOSTER VACCINE (1 of 2) 03/08/2022 (Originally 10/1/2006)   • MAMMOGRAM  11/23/2023   • TDAP/TD VACCINES (3 - Td or Tdap) 12/21/2026   • INFLUENZA VACCINE  Completed              Assessment/Plan     CMS Preventative Services Quick Reference  Risk Factors Identified During Encounter  Immunizations Discussed/Encouraged (specific Immunizations; Pneumococcal 23  The above risks/problems have been discussed with the patient.  Follow up actions/plans if indicated are seen below in the Assessment/Plan Section.  Pertinent information has been shared with the patient in the After Visit Summary.    Diagnoses and all orders for this visit:    1. Medicare annual wellness visit, subsequent (Primary)    2. INO (generalized anxiety disorder)    3. Microcytic anemia    4.  "Essential hypertension  -     Lipid Panel; Future  -     Comprehensive metabolic panel; Future    5. Acquired hypothyroidism  -     TSH; Future    6. B12 deficiency  -     Vitamin B12 & Folate; Future    7. Vitamin D deficiency  -     Vitamin D 25 Hydroxy; Future    Other orders  -     Discontinue: citalopram (CeleXA) 20 MG tablet; Take 1 tablet by mouth Daily.  Dispense: 30 tablet; Refill: 11  -     cyanocobalamin 1000 MCG/ML injection; ADMINISTER 1 MILLILITER INTRAMUSCULARLY ONCE MONTHLY FOR PERNICIOUS ANEMIA  Dispense: 1 mL; Refill: 11  -     Syringe/Needle, Disp, (B-D 3CC LUER-LEXX SYR 25GX1\") 25G X 1\" 3 ML misc; Use with B12 solution monthly  Dispense: 1 each; Refill: 11  -     levothyroxine (SYNTHROID, LEVOTHROID) 112 MCG tablet; Take 1 tablet by mouth Daily.  Dispense: 90 tablet; Refill: 3  -     estradiol (Minivelle) 0.1 MG/24HR patch; Place 1 patch on the skin as directed by provider 2 (Two) Times a Week for 30 days.  Apply 1 patch twice a week by transderm. route  Dispense: 24 patch; Refill: 3  -     citalopram (CeleXA) 20 MG tablet; Take 1 tablet by mouth Daily.  Dispense: 90 tablet; Refill: 3      1. Medicare annual wellness visit, subsequent (Primary)        - The patient is up to date on her eye exams and mammograms.        - She is due for a pneumonia vaccine and will receive this today.        - I advised the patient to stay hydrated and avoid sweets.     2. INO (generalized anxiety disorder)     3. Microcytic anemia     4. Essential hypertension        - Labs will be obtained (Lipid Panel; Future & Comprehensive metabolic panel; Future)     5. Acquired hypothyroidism        - Labs will be obtained (TSH; Future)     6. B12 deficiency        - Labs will be obtained (Vitamin B12 & Folate; Future)     7. Vitamin D deficiency        - Labs will be obtained (Vitamin D 25 Hydroxy; Future)    Follow Up:  Return in about 6 months (around 9/8/2022) for refill meds/follow up on anxiety meds.     An After Visit " Summary and PPPS were given to the patient.          Prescription written for 6-month refill on current dosage of Xanax, follow-up in 6 months for recheck.    As part of this patient's treatment plan, patient will be prescribed controlled substances. The patient has been made aware of appropriate use of such medications, including potential risk of somnolence, limited ability to drive and /or work safely, and potential for dependence or overdose. It has also been made clear that these medications are for use by this patient only, without concomitant use of alcohol or other substances unless prescribed.Controlled substance status of medication discussed with patient, discussed risks of medication including abuse potential and diversion potential and need to follow up for reevaluation appointment in order to receive further refills.    Part of this note may be an electronic transcription/translation of spoken language to printed text using the Dragon Dictation System.      Transcribed from ambient dictation for Vijaya Figueroa PA-C by Sky Beavers.  03/08/22   18:29 EST    Patient verbalized consent to the visit recording.  I have personally performed the services described in this document as transcribed by the above individual, and it is both accurate and complete.  Vijaya Figueroa PA-C  3/9/2022  13:07 EST

## 2022-03-10 ENCOUNTER — LAB (OUTPATIENT)
Dept: LAB | Facility: HOSPITAL | Age: 67
End: 2022-03-10

## 2022-03-10 DIAGNOSIS — I10 ESSENTIAL HYPERTENSION: ICD-10-CM

## 2022-03-10 DIAGNOSIS — E03.9 ACQUIRED HYPOTHYROIDISM: ICD-10-CM

## 2022-03-10 DIAGNOSIS — E55.9 VITAMIN D DEFICIENCY: ICD-10-CM

## 2022-03-10 DIAGNOSIS — E53.8 B12 DEFICIENCY: ICD-10-CM

## 2022-03-10 LAB
25(OH)D3 SERPL-MCNC: 36.1 NG/ML (ref 30–100)
FOLATE SERPL-MCNC: 4.84 NG/ML (ref 4.78–24.2)
VIT B12 BLD-MCNC: 263 PG/ML (ref 211–946)

## 2022-03-10 PROCEDURE — 36415 COLL VENOUS BLD VENIPUNCTURE: CPT

## 2022-03-10 PROCEDURE — 80053 COMPREHEN METABOLIC PANEL: CPT

## 2022-03-10 PROCEDURE — 82306 VITAMIN D 25 HYDROXY: CPT

## 2022-03-10 PROCEDURE — 82746 ASSAY OF FOLIC ACID SERUM: CPT

## 2022-03-10 PROCEDURE — 80061 LIPID PANEL: CPT

## 2022-03-10 PROCEDURE — 82607 VITAMIN B-12: CPT

## 2022-03-10 PROCEDURE — 84443 ASSAY THYROID STIM HORMONE: CPT

## 2022-03-11 LAB
ALBUMIN SERPL-MCNC: 3.6 G/DL (ref 3.5–5.2)
ALBUMIN/GLOB SERPL: 1.2 G/DL
ALP SERPL-CCNC: 91 U/L (ref 39–117)
ALT SERPL W P-5'-P-CCNC: 13 U/L (ref 1–33)
ANION GAP SERPL CALCULATED.3IONS-SCNC: 8.8 MMOL/L (ref 5–15)
AST SERPL-CCNC: 15 U/L (ref 1–32)
BILIRUB SERPL-MCNC: 0.9 MG/DL (ref 0–1.2)
BUN SERPL-MCNC: 11 MG/DL (ref 8–23)
BUN/CREAT SERPL: 16.2 (ref 7–25)
CALCIUM SPEC-SCNC: 9.4 MG/DL (ref 8.6–10.5)
CHLORIDE SERPL-SCNC: 106 MMOL/L (ref 98–107)
CHOLEST SERPL-MCNC: 149 MG/DL (ref 0–200)
CO2 SERPL-SCNC: 25.2 MMOL/L (ref 22–29)
CREAT SERPL-MCNC: 0.68 MG/DL (ref 0.57–1)
EGFRCR SERPLBLD CKD-EPI 2021: 96.8 ML/MIN/1.73
GLOBULIN UR ELPH-MCNC: 3.1 GM/DL
GLUCOSE SERPL-MCNC: 95 MG/DL (ref 65–99)
HDLC SERPL-MCNC: 51 MG/DL (ref 40–60)
LDLC SERPL CALC-MCNC: 81 MG/DL (ref 0–100)
LDLC/HDLC SERPL: 1.58 {RATIO}
POTASSIUM SERPL-SCNC: 4.2 MMOL/L (ref 3.5–5.2)
PROT SERPL-MCNC: 6.7 G/DL (ref 6–8.5)
SODIUM SERPL-SCNC: 140 MMOL/L (ref 136–145)
TRIGL SERPL-MCNC: 88 MG/DL (ref 0–150)
TSH SERPL DL<=0.05 MIU/L-ACNC: 0.17 UIU/ML (ref 0.27–4.2)
VLDLC SERPL-MCNC: 17 MG/DL (ref 5–40)

## 2022-03-11 RX ORDER — LEVOTHYROXINE SODIUM 0.1 MG/1
100 TABLET ORAL
Qty: 90 TABLET | Refills: 3 | Status: SHIPPED | OUTPATIENT
Start: 2022-03-11 | End: 2022-10-28 | Stop reason: SDUPTHER

## 2022-05-03 ENCOUNTER — TELEPHONE (OUTPATIENT)
Dept: INTERNAL MEDICINE | Facility: CLINIC | Age: 67
End: 2022-05-03

## 2022-05-03 DIAGNOSIS — G47.39 SLEEP APNEA-LIKE BEHAVIOR: Primary | ICD-10-CM

## 2022-05-03 NOTE — TELEPHONE ENCOUNTER
----- Message from Jeane Martines sent at 5/3/2022 11:57 AM EDT -----  Regarding: Sleep test  I was needing a referral for a sleep test and I would like to go to [facility] or somewhere my insurance will cover. Thank you

## 2022-05-04 ENCOUNTER — TELEPHONE (OUTPATIENT)
Dept: FAMILY MEDICINE CLINIC | Facility: CLINIC | Age: 67
End: 2022-05-04

## 2022-05-04 NOTE — TELEPHONE ENCOUNTER
Per pt:  No I have not been diagnosed but have discussed it with Dr Figueroa and she stated I should have it checked when I was ready.  I snore really bad and my  states I quit breathing at times. Just  concerned and decided I probably should have it checked out.

## 2022-05-04 NOTE — TELEPHONE ENCOUNTER
----- Message from Jeane Martines sent at 5/3/2022  9:36 PM EDT -----  Regarding: Sleep test  No I have not been diagnosed but have discussed it with Dr Figueroa and she stated I should have it checked when I was ready.  I snore really bad and my  states I quit breathing at times. Just  concerned and decided I probably should have it checked out.

## 2022-05-10 ENCOUNTER — LAB (OUTPATIENT)
Dept: LAB | Facility: HOSPITAL | Age: 67
End: 2022-05-10

## 2022-05-10 ENCOUNTER — OFFICE VISIT (OUTPATIENT)
Dept: ONCOLOGY | Facility: CLINIC | Age: 67
End: 2022-05-10

## 2022-05-10 ENCOUNTER — TELEPHONE (OUTPATIENT)
Dept: ONCOLOGY | Facility: CLINIC | Age: 67
End: 2022-05-10

## 2022-05-10 VITALS
OXYGEN SATURATION: 97 % | BODY MASS INDEX: 37.21 KG/M2 | HEART RATE: 61 BPM | SYSTOLIC BLOOD PRESSURE: 147 MMHG | DIASTOLIC BLOOD PRESSURE: 67 MMHG | TEMPERATURE: 97.3 F | RESPIRATION RATE: 16 BRPM | HEIGHT: 63 IN | WEIGHT: 210 LBS

## 2022-05-10 DIAGNOSIS — K90.9 IRON MALABSORPTION: ICD-10-CM

## 2022-05-10 DIAGNOSIS — D50.9 IRON DEFICIENCY ANEMIA, UNSPECIFIED IRON DEFICIENCY ANEMIA TYPE: Primary | ICD-10-CM

## 2022-05-10 DIAGNOSIS — D50.9 MICROCYTIC ANEMIA: ICD-10-CM

## 2022-05-10 DIAGNOSIS — D50.9 IRON DEFICIENCY ANEMIA, UNSPECIFIED IRON DEFICIENCY ANEMIA TYPE: ICD-10-CM

## 2022-05-10 LAB
ERYTHROCYTE [DISTWIDTH] IN BLOOD BY AUTOMATED COUNT: 13.4 % (ref 12.3–15.4)
FERRITIN SERPL-MCNC: 18.56 NG/ML (ref 13–150)
HCT VFR BLD AUTO: 44.1 % (ref 34–46.6)
HGB BLD-MCNC: 13.8 G/DL (ref 12–15.9)
IRON 24H UR-MRATE: 50 MCG/DL (ref 37–145)
IRON SATN MFR SERPL: 11 % (ref 20–50)
LYMPHOCYTES # BLD AUTO: 2.1 10*3/MM3 (ref 0.7–3.1)
LYMPHOCYTES NFR BLD AUTO: 27 % (ref 19.6–45.3)
MCH RBC QN AUTO: 26.3 PG (ref 26.6–33)
MCHC RBC AUTO-ENTMCNC: 31.2 G/DL (ref 31.5–35.7)
MCV RBC AUTO: 84.3 FL (ref 79–97)
MONOCYTES # BLD AUTO: 0.5 10*3/MM3 (ref 0.1–0.9)
MONOCYTES NFR BLD AUTO: 6.6 % (ref 5–12)
NEUTROPHILS NFR BLD AUTO: 5.1 10*3/MM3 (ref 1.7–7)
NEUTROPHILS NFR BLD AUTO: 66.4 % (ref 42.7–76)
PLATELET # BLD AUTO: 309 10*3/MM3 (ref 140–450)
PMV BLD AUTO: 8.3 FL (ref 6–12)
RBC # BLD AUTO: 5.23 10*6/MM3 (ref 3.77–5.28)
TIBC SERPL-MCNC: 448 MCG/DL (ref 298–536)
TRANSFERRIN SERPL-MCNC: 301 MG/DL (ref 200–360)
WBC NRBC COR # BLD: 7.7 10*3/MM3 (ref 3.4–10.8)

## 2022-05-10 PROCEDURE — 85025 COMPLETE CBC W/AUTO DIFF WBC: CPT

## 2022-05-10 PROCEDURE — 36415 COLL VENOUS BLD VENIPUNCTURE: CPT

## 2022-05-10 PROCEDURE — 82728 ASSAY OF FERRITIN: CPT

## 2022-05-10 PROCEDURE — 84466 ASSAY OF TRANSFERRIN: CPT

## 2022-05-10 PROCEDURE — 83540 ASSAY OF IRON: CPT

## 2022-05-10 PROCEDURE — 99214 OFFICE O/P EST MOD 30 MIN: CPT | Performed by: INTERNAL MEDICINE

## 2022-05-10 RX ORDER — DIPHENHYDRAMINE HCL 25 MG
25 CAPSULE ORAL ONCE
Status: CANCELLED | OUTPATIENT
Start: 2022-05-24

## 2022-05-10 RX ORDER — FAMOTIDINE 10 MG/ML
20 INJECTION, SOLUTION INTRAVENOUS ONCE
Status: CANCELLED | OUTPATIENT
Start: 2022-05-17

## 2022-05-10 RX ORDER — PANTOPRAZOLE SODIUM 40 MG/1
TABLET, DELAYED RELEASE ORAL
COMMUNITY
Start: 2022-04-14 | End: 2022-09-22

## 2022-05-10 RX ORDER — TIMOLOL MALEATE 6.8 MG/ML
SOLUTION/ DROPS OPHTHALMIC
COMMUNITY
Start: 2022-04-27

## 2022-05-10 RX ORDER — SODIUM CHLORIDE 9 MG/ML
250 INJECTION, SOLUTION INTRAVENOUS ONCE
Status: CANCELLED | OUTPATIENT
Start: 2022-05-24

## 2022-05-10 RX ORDER — FAMOTIDINE 10 MG/ML
20 INJECTION, SOLUTION INTRAVENOUS ONCE
Status: CANCELLED | OUTPATIENT
Start: 2022-05-24

## 2022-05-10 RX ORDER — DIPHENHYDRAMINE HCL 25 MG
25 CAPSULE ORAL ONCE
Status: CANCELLED | OUTPATIENT
Start: 2022-05-17

## 2022-05-10 RX ORDER — SODIUM CHLORIDE 9 MG/ML
250 INJECTION, SOLUTION INTRAVENOUS ONCE
Status: CANCELLED | OUTPATIENT
Start: 2022-05-17

## 2022-05-10 NOTE — PROGRESS NOTES
DATE OF VISIT: 5/10/2022    REASON FOR VISIT: Followup for microcytic anemia     PROBLEM LIST:  1.  Anemia  2.  Microcytosis:  A.  Negative EGD and colonoscopy done by Dr. Andrade April 12, 2022  3.  Hypothyroid  4.  Hypertension  5.  Depression    HISTORY OF PRESENT ILLNESS: The patient is a very pleasant 66 y.o. female  with past medical history significant for microcytic anemia with iron deficiency diagnosed May 2021. She presented with fatigue, shortness of breath, and blurry vision. Her hemoglobin was found to be 9.2 with MCV 72. The patient received 6 doses of IV iron replacement using Ferrlecit. The patient is here today for scheduled follow up visit with labs.     SUBJECTIVE: The patient is here today by herself.  She is feeling better.  Denies any bleeding no dark stools.    Past History:  Medical History: has a past medical history of Allergic (3 yrs), Anemia, Depression, Hypertension, and Thyroid disease.   Surgical History: has a past surgical history that includes Oophorectomy; Hysterectomy; Tubal ligation; and Colonoscopy.   Family History: family history includes Breast cancer in her paternal aunt; Breast cancer (age of onset: 78) in her sister; Diabetes in her maternal grandmother; Heart attack in her brother and mother; Hypertension in her father, mother, and sister; Thyroid disease in her father.   Social History: reports that she has never smoked. She has never used smokeless tobacco. She reports that she does not drink alcohol and does not use drugs.    (Not in a hospital admission)     Allergies: Patient has no known allergies.    Review of Systems   Constitutional: Positive for fatigue. Negative for activity change, appetite change, chills, fever and unexpected weight change.   HENT: Negative for hearing loss, mouth sores, nosebleeds, sore throat and trouble swallowing.    Eyes: Negative for visual disturbance.   Respiratory: Negative for cough, chest tightness, shortness of breath and  wheezing.    Cardiovascular: Negative for chest pain, palpitations and leg swelling.   Gastrointestinal: Negative for abdominal distention, abdominal pain, blood in stool, constipation, diarrhea, nausea, rectal pain and vomiting.        Acid reflux   Endocrine: Negative for cold intolerance and heat intolerance.   Genitourinary: Negative for difficulty urinating, dysuria, frequency and urgency.   Musculoskeletal: Negative for arthralgias, back pain, gait problem, joint swelling and myalgias.   Skin: Negative for rash.   Neurological: Negative for dizziness, tremors, syncope, weakness, light-headedness, numbness and headaches.   Hematological: Negative for adenopathy. Does not bruise/bleed easily.   Psychiatric/Behavioral: Negative for confusion, sleep disturbance and suicidal ideas. The patient is not nervous/anxious.          Current Outpatient Medications:   •  ALPRAZolam (Xanax) 0.25 MG tablet, Take 1 tablet by mouth At Night As Needed for Anxiety or Sleep.  Take 1 tablet every day by oral route as needed., Disp: 14 tablet, Rfl: 0  •  Cholecalciferol (VITAMIN D3) 50 MCG (2000 UT) tablet, Take 1 tablet by mouth Daily., Disp: 30 tablet, Rfl: 11  •  citalopram (CeleXA) 20 MG tablet, Take 1 tablet by mouth Daily., Disp: 90 tablet, Rfl: 3  •  cyanocobalamin 1000 MCG/ML injection, ADMINISTER 1 MILLILITER INTRAMUSCULARLY ONCE MONTHLY FOR PERNICIOUS ANEMIA, Disp: 1 mL, Rfl: 11  •  estradiol (Minivelle) 0.1 MG/24HR patch, Place 1 patch on the skin as directed by provider 2 (Two) Times a Week for 30 days.  Apply 1 patch twice a week by transderm. route, Disp: 24 patch, Rfl: 3  •  fluticasone (FLONASE) 50 MCG/ACT nasal spray, 2 sprays into the nostril(s) as directed by provider Daily., Disp: , Rfl:   •  levothyroxine (Synthroid) 100 MCG tablet, Take 1 tablet by mouth Every Morning. New dose, repeat TSH in June, Disp: 90 tablet, Rfl: 3  •  loratadine (Claritin) 10 MG tablet, Take 1 tablet by mouth Daily. Take 1 tablet every  "day ., Disp: 30 tablet, Rfl: 11  •  metoprolol succinate XL (TOPROL-XL) 50 MG 24 hr tablet, Take 1 tablet by mouth Daily., Disp: 30 tablet, Rfl: 11  •  pantoprazole (PROTONIX) 40 MG EC tablet, , Disp: , Rfl:   •  Syringe/Needle, Disp, (B-D 3CC LUER-LEXX SYR 25GX1\") 25G X 1\" 3 ML misc, Use with B12 solution monthly, Disp: 1 each, Rfl: 11  •  Timolol Maleate, Once-Daily, 0.5 % solution, , Disp: , Rfl:     PHYSICAL EXAMINATION:   /67   Pulse 61   Temp 97.3 °F (36.3 °C) (Temporal)   Resp 16   Ht 160 cm (62.99\")   Wt 95.3 kg (210 lb)   SpO2 97%   BMI 37.21 kg/m²    Pain Score    05/10/22 1351   PainSc: 0-No pain        ECOG score: 0        ECOG Performance Status: 1 - Symptomatic but completely ambulatory  General Appearance:  alert, cooperative, no apparent distress and appears stated age   Neurologic/Psychiatric: A&O x 3, gait steady, appropriate affect, strength 5/5 in all muscle groups   HEENT:  Normocephalic, without obvious abnormality, mucous membranes moist   Neck: Supple, symmetrical, trachea midline, no adenopathy;  No thyromegaly, masses, or tenderness   Lungs:   Clear to auscultation bilaterally; respirations regular, even, and unlabored bilaterally   Heart:  Regular rate and rhythm, no murmurs appreciated   Abdomen:   Soft, non-tender, non-distended and no organomegaly   Lymph nodes: No cervical, supraclavicular, inguinal or axillary adenopathy noted   Extremities: Normal, atraumatic; no clubbing, cyanosis, or edema    Skin: No rashes, ulcers, or suspicious lesions noted     Lab on 05/10/2022   Component Date Value Ref Range Status   • WBC 05/10/2022 7.70  3.40 - 10.80 10*3/mm3 Final   • RBC 05/10/2022 5.23  3.77 - 5.28 10*6/mm3 Final   • Hemoglobin 05/10/2022 13.8  12.0 - 15.9 g/dL Final   • Hematocrit 05/10/2022 44.1  34.0 - 46.6 % Final   • RDW 05/10/2022 13.4  12.3 - 15.4 % Final   • MCV 05/10/2022 84.3  79.0 - 97.0 fL Final   • MCH 05/10/2022 26.3 (A) 26.6 - 33.0 pg Final   • MCHC 05/10/2022 " 31.2 (A) 31.5 - 35.7 g/dL Final   • MPV 05/10/2022 8.3  6.0 - 12.0 fL Final   • Platelets 05/10/2022 309  140 - 450 10*3/mm3 Final   • Neutrophil % 05/10/2022 66.4  42.7 - 76.0 % Final   • Lymphocyte % 05/10/2022 27.0  19.6 - 45.3 % Final   • Monocyte % 05/10/2022 6.6  5.0 - 12.0 % Final   • Neutrophils, Absolute 05/10/2022 5.10  1.70 - 7.00 10*3/mm3 Final   • Lymphocytes, Absolute 05/10/2022 2.10  0.70 - 3.10 10*3/mm3 Final   • Monocytes, Absolute 05/10/2022 0.50  0.10 - 0.90 10*3/mm3 Final        No results found.    ASSESSMENT: The patient is a very pleasant 66 y.o. female  with microcytic anemia.        PLAN:  1.  Anemia:  A.  I did go over the CBC result from today and reassured the patient her hemoglobin as well as MCV are both back to normal.  B.  She will follow me in 6 months with CBC.    2.  Iron deficiency:  A.  I will follow-up on her iron profile from today.  B.  I will continue as needed IV iron replacement for ferritin less than 30 or saturation is than 10%.  C.  She will continue follow-up with GI.  I explained to the patient that if her iron profile from today shows relapse iron deficiency she will benefit from capsule enteroscopy.  She is scheduled see Dr. Andrade in 4 weeks and she will discuss it further with him.    3.  Hypothyroid:  A.  She will continue levothyroxine 112 mcg daily for hypothyroidism.     4.  Depression:  A.  She will continue citalopram 20 mg daily for depression.     FOLLOW UP: Next month CBC and iron profile.    France Aden MD  5/10/2022

## 2022-05-10 NOTE — TELEPHONE ENCOUNTER
Let patient know she needs iv iron.  I advised if insurance declines Injectafer we will have to do another form that is for 6 doses.  I advised that Dr. Aden wants her to see Dr. Andrade and ask him about a capsule enteroscopy.  She said she sees him in a month and will ask him about it then.

## 2022-05-16 ENCOUNTER — TELEPHONE (OUTPATIENT)
Dept: ONCOLOGY | Facility: CLINIC | Age: 67
End: 2022-05-16

## 2022-05-16 NOTE — TELEPHONE ENCOUNTER
Caller: Jeane Martines    Relationship: Self    Best call back number: [phone]    What is the best time to reach you: ASAP    Who are you requesting to speak with (clinical staff, provider,  specific staff member):     Do you know the name of the person who called:     What was the call regarding: PT WANTS TO R/S APPT    Do you require a callback: YES

## 2022-05-17 ENCOUNTER — HOSPITAL ENCOUNTER (OUTPATIENT)
Dept: ONCOLOGY | Facility: HOSPITAL | Age: 67
Setting detail: INFUSION SERIES
Discharge: HOME | End: 2022-05-17

## 2022-05-17 VITALS
RESPIRATION RATE: 18 BRPM | SYSTOLIC BLOOD PRESSURE: 169 MMHG | WEIGHT: 216 LBS | HEART RATE: 60 BPM | HEIGHT: 62 IN | DIASTOLIC BLOOD PRESSURE: 57 MMHG | TEMPERATURE: 96.9 F | BODY MASS INDEX: 39.75 KG/M2

## 2022-05-17 DIAGNOSIS — D50.9 IRON DEFICIENCY ANEMIA, UNSPECIFIED IRON DEFICIENCY ANEMIA TYPE: Primary | ICD-10-CM

## 2022-05-17 DIAGNOSIS — K90.9 IRON MALABSORPTION: ICD-10-CM

## 2022-05-17 PROCEDURE — 96375 TX/PRO/DX INJ NEW DRUG ADDON: CPT

## 2022-05-17 PROCEDURE — 96374 THER/PROPH/DIAG INJ IV PUSH: CPT

## 2022-05-17 PROCEDURE — 63710000001 DIPHENHYDRAMINE PER 50 MG: Performed by: INTERNAL MEDICINE

## 2022-05-17 PROCEDURE — 25010000002 FERRIC CARBOXYMALTOSE 750 MG/15ML SOLUTION 15 ML VIAL: Performed by: INTERNAL MEDICINE

## 2022-05-17 RX ORDER — SODIUM CHLORIDE 9 MG/ML
250 INJECTION, SOLUTION INTRAVENOUS ONCE
Status: COMPLETED | OUTPATIENT
Start: 2022-05-17 | End: 2022-05-17

## 2022-05-17 RX ORDER — DIPHENHYDRAMINE HCL 25 MG
25 CAPSULE ORAL ONCE
Status: COMPLETED | OUTPATIENT
Start: 2022-05-17 | End: 2022-05-17

## 2022-05-17 RX ORDER — FAMOTIDINE 10 MG/ML
20 INJECTION, SOLUTION INTRAVENOUS ONCE
Status: COMPLETED | OUTPATIENT
Start: 2022-05-17 | End: 2022-05-17

## 2022-05-17 RX ADMIN — FERRIC CARBOXYMALTOSE INJECTION 750 MG: 50 INJECTION, SOLUTION INTRAVENOUS at 14:20

## 2022-05-17 RX ADMIN — DIPHENHYDRAMINE HYDROCHLORIDE 25 MG: 25 CAPSULE ORAL at 13:37

## 2022-05-17 RX ADMIN — SODIUM CHLORIDE 250 ML: 9 INJECTION, SOLUTION INTRAVENOUS at 13:37

## 2022-05-17 RX ADMIN — FAMOTIDINE 20 MG: 10 INJECTION INTRAVENOUS at 13:38

## 2022-05-24 ENCOUNTER — HOSPITAL ENCOUNTER (OUTPATIENT)
Dept: ONCOLOGY | Facility: HOSPITAL | Age: 67
Setting detail: INFUSION SERIES
Discharge: HOME | End: 2022-05-24

## 2022-05-24 VITALS
HEIGHT: 62 IN | WEIGHT: 216 LBS | SYSTOLIC BLOOD PRESSURE: 162 MMHG | TEMPERATURE: 97.4 F | RESPIRATION RATE: 18 BRPM | DIASTOLIC BLOOD PRESSURE: 60 MMHG | BODY MASS INDEX: 39.75 KG/M2 | HEART RATE: 59 BPM

## 2022-05-24 DIAGNOSIS — D50.9 IRON DEFICIENCY ANEMIA, UNSPECIFIED IRON DEFICIENCY ANEMIA TYPE: Primary | ICD-10-CM

## 2022-05-24 DIAGNOSIS — K90.9 IRON MALABSORPTION: ICD-10-CM

## 2022-05-24 PROCEDURE — 96374 THER/PROPH/DIAG INJ IV PUSH: CPT

## 2022-05-24 PROCEDURE — 25010000002 FERRIC CARBOXYMALTOSE 750 MG/15ML SOLUTION 15 ML VIAL: Performed by: INTERNAL MEDICINE

## 2022-05-24 PROCEDURE — 96365 THER/PROPH/DIAG IV INF INIT: CPT

## 2022-05-24 PROCEDURE — 96413 CHEMO IV INFUSION 1 HR: CPT

## 2022-05-24 PROCEDURE — 63710000001 DIPHENHYDRAMINE PER 50 MG: Performed by: INTERNAL MEDICINE

## 2022-05-24 RX ORDER — FAMOTIDINE 10 MG/ML
20 INJECTION, SOLUTION INTRAVENOUS ONCE
Status: DISCONTINUED | OUTPATIENT
Start: 2022-05-24 | End: 2022-05-24

## 2022-05-24 RX ORDER — SODIUM CHLORIDE 9 MG/ML
250 INJECTION, SOLUTION INTRAVENOUS ONCE
Status: COMPLETED | OUTPATIENT
Start: 2022-05-24 | End: 2022-05-24

## 2022-05-24 RX ORDER — DIPHENHYDRAMINE HCL 25 MG
25 CAPSULE ORAL ONCE
Status: COMPLETED | OUTPATIENT
Start: 2022-05-24 | End: 2022-05-24

## 2022-05-24 RX ORDER — FAMOTIDINE 20 MG/1
20 TABLET, FILM COATED ORAL ONCE
Status: COMPLETED | OUTPATIENT
Start: 2022-05-24 | End: 2022-05-24

## 2022-05-24 RX ADMIN — FAMOTIDINE 20 MG: 20 TABLET ORAL at 13:17

## 2022-05-24 RX ADMIN — FERRIC CARBOXYMALTOSE INJECTION 750 MG: 50 INJECTION, SOLUTION INTRAVENOUS at 13:50

## 2022-05-24 RX ADMIN — SODIUM CHLORIDE 250 ML: 9 INJECTION, SOLUTION INTRAVENOUS at 13:50

## 2022-05-24 RX ADMIN — DIPHENHYDRAMINE HYDROCHLORIDE 25 MG: 25 CAPSULE ORAL at 13:17

## 2022-05-24 NOTE — ADDENDUM NOTE
Encounter addended by: Mayra Rosenberg RN on: 5/24/2022 3:10 PM   Actions taken: Charge Capture section accepted

## 2022-05-31 ENCOUNTER — OFFICE VISIT (OUTPATIENT)
Dept: SLEEP MEDICINE | Facility: HOSPITAL | Age: 67
End: 2022-05-31

## 2022-05-31 VITALS
BODY MASS INDEX: 36.98 KG/M2 | SYSTOLIC BLOOD PRESSURE: 141 MMHG | DIASTOLIC BLOOD PRESSURE: 59 MMHG | HEIGHT: 64 IN | HEART RATE: 55 BPM | OXYGEN SATURATION: 97 % | WEIGHT: 216.6 LBS

## 2022-05-31 DIAGNOSIS — G47.33 OBSTRUCTIVE SLEEP APNEA, ADULT: ICD-10-CM

## 2022-05-31 DIAGNOSIS — E66.09 CLASS 2 OBESITY DUE TO EXCESS CALORIES WITHOUT SERIOUS COMORBIDITY WITH BODY MASS INDEX (BMI) OF 39.0 TO 39.9 IN ADULT: ICD-10-CM

## 2022-05-31 DIAGNOSIS — R06.83 SNORING: Primary | ICD-10-CM

## 2022-05-31 PROCEDURE — 99203 OFFICE O/P NEW LOW 30 MIN: CPT | Performed by: INTERNAL MEDICINE

## 2022-06-16 NOTE — PROGRESS NOTES
Chief Complaint  Snoring and nonrestorative sleep    Subjective        Jeane Martines presents to [facility] for the evaluation of snoring and nonrestorative sleep.  Her primary care provider is Vijaya RIOS.  She is seen in person in the sleep clinic.  History of Present Illness  Patient says she has been noted by her  to have snoring and apneas.  She has had both noted for about 2 years.  She denies any recent changes in her weight.  She denies awakening gasping for breath.  She is not rested on arising in the morning.  She denies having morning headaches.  She has been found to have retinal hemorrhage.    She has awaken with a dry mouth.  She denies ever breaking her nose or having trouble breathing through her nose.  She denies kicking or jerking her legs at night.  She denies having chronic pain that keeps her awake.  She will have a fall asleep to sitting quietly during the day.  She denies having problems driving.    She goes to bed about 11:30 PM.  She will fall asleep in 30 minutes if she takes her alprazolam.  It takes her 2 hours to fall asleep if she does not use medications.  She awakens 4-5 times during the night.  She gets about 8 hours of sleep but is not rested.  She has had hypertension known for 6 years.  She denies any history of diabetes or coronary artery disease.  She does have a history of anemia.    Past medical history:    Allergies: She has seasonal environmental allergies    Habits: Smoking: Without    Ethanol: Without    Caffeine: She has 1 cup of coffee per day.  She has an occasional tea.  She has 2 grant per week.    Medical illnesses: She has a history of hypertension    Medications:ALPRAZolam (Xanax) 0.25 MG tablet    Cholecalciferol (VITAMIN D3) 50 MCG (2000 UT) tablet    citalopram (CeleXA) 20 MG tablet    cyanocobalamin 1000 MCG/ML injection    fluticasone (FLONASE) 50 MCG/ACT nasal spray    levothyroxine (Synthroid) 100 MCG  "tablet    loratadine (Claritin) 10 MG tablet    metoprolol succinate XL (TOPROL-XL) 50 MG 24 hr tablet    pantoprazole (PROTONIX) 40 MG EC tablet    Syringe/Needle, Disp, (B-D 3CC LUER-LEXX SYR 25GX1\") 25G X 1\" 3 ML misc    Timolol Maleate, Once-Daily, 0.5 % solution    estradiol (Minivelle) 0.1 MG/24HR patch ()      Surgeries: She had wisdom teeth extraction hysterectomy    Family history: Positive for heart disease, hypertension, stroke, obesity, thyroid disease, cancer.    Review of systems: Positives include fatigue, visual disturbance, environmental allergies.  Other systems reviewed and reported as negative.    [place] score is 13/24  Objective   Vital Signs:  /59 (BP Location: Left arm, Patient Position: Sitting, Cuff Size: Adult)   Pulse 55   Ht 162.6 cm (64\")   Wt 98.2 kg (216 lb 9.6 oz)   SpO2 97%   BMI 37.18 kg/m²   Estimated body mass index is 37.18 kg/m² as calculated from the following:    Height as of this encounter: 162.6 cm (64\").    Weight as of this encounter: 98.2 kg (216 lb 9.6 oz).          Physical Exam patient appears to be awake and alert.  She does not appear to be in acute respiratory distress.    She is normocephalic.  She has Mallampati class III anatomy.    Lungs are clear.    Cardiac exam revealed normal S1-S2.    Extremities showed 1+ pedal edema.  Result Review :         Assessment and Plan   Diagnoses and all orders for this visit:    1. Snoring (Primary)  -     Home Sleep Study; Future    2. Obstructive sleep apnea, adult  -     Home Sleep Study; Future    3. Class 2 obesity due to excess calories without serious comorbidity with body mass index (BMI) of 39.0 to 39.9 in adult    Patient has a history of snoring and nonrestorative sleep.  She has a fairly good story for obstructive sleep apnea.  We will plan to proceed to home sleep testing.  We have discussed potential therapies including CPAP, weight control, oral appliance, and surgery.  We have discussed the " consequences of long-term untreated obstructive sleep apnea.  These include hypertension, diabetes, heart disease, stroke, and dementia.  She is encouraged to lose weight.  She says she is working with weight watchers.  She is encouraged to avoid alcohol and sedatives close to bedtime.  She is encouraged to practice lateral position sleep.    The patient also seems to have some psychophysiologic insomnia and has difficulty getting to sleep without taking alprazolam.  She is encouraged to practice excellent sleep hygiene.  She is to get a regular rise time and a regular bedtime.  She is to get light exposure early in the day.  She is try to get exercise on a regular basis.  She is to cut off caffeine by mid afternoon.  She has developed a nighttime ritual and get off electronic screens of at least an hour before the time she wishes to be asleep.  These issues will need to be addressed further on her return.       I spent 35 minutes caring for Jeane on this date of service. This time includes time spent by me in the following activities:obtaining and/or reviewing a separately obtained history, performing a medically appropriate examination and/or evaluation , counseling and educating the patient/family/caregiver, ordering medications, tests, or procedures and documenting information in the medical record  Follow Up   Return for Follow up after study, Next scheduled follow-up.  Patient was given instructions and counseling regarding her condition or for health maintenance advice. Please see specific information pulled into the AVS if appropriate.   Grady Robertson MD [facility]  Sleep Medicine  Pulmonary and Critical Care Medicine

## 2022-06-20 DIAGNOSIS — F41.9 ANXIETY: ICD-10-CM

## 2022-06-20 RX ORDER — ALPRAZOLAM 0.25 MG/1
0.25 TABLET ORAL NIGHTLY PRN
Qty: 14 TABLET | Refills: 0 | OUTPATIENT
Start: 2022-06-20

## 2022-06-30 ENCOUNTER — HOSPITAL ENCOUNTER (OUTPATIENT)
Dept: SLEEP MEDICINE | Facility: HOSPITAL | Age: 67
Discharge: HOME | End: 2022-06-30
Admitting: INTERNAL MEDICINE

## 2022-06-30 VITALS — BODY MASS INDEX: 36.96 KG/M2 | WEIGHT: 216.49 LBS | HEIGHT: 64 IN

## 2022-06-30 DIAGNOSIS — R06.83 SNORING: ICD-10-CM

## 2022-06-30 DIAGNOSIS — G47.33 OBSTRUCTIVE SLEEP APNEA, ADULT: ICD-10-CM

## 2022-06-30 PROCEDURE — 95806 SLEEP STUDY UNATT&RESP EFFT: CPT

## 2022-06-30 PROCEDURE — 95806 SLEEP STUDY UNATT&RESP EFFT: CPT | Performed by: INTERNAL MEDICINE

## 2022-07-05 DIAGNOSIS — G47.33 OBSTRUCTIVE SLEEP APNEA, ADULT: Primary | ICD-10-CM

## 2022-07-05 DIAGNOSIS — I10 ESSENTIAL HYPERTENSION: ICD-10-CM

## 2022-07-06 ENCOUNTER — TELEPHONE (OUTPATIENT)
Dept: SLEEP MEDICINE | Facility: HOSPITAL | Age: 67
End: 2022-07-06

## 2022-09-22 ENCOUNTER — OFFICE VISIT (OUTPATIENT)
Dept: SLEEP MEDICINE | Facility: HOSPITAL | Age: 67
End: 2022-09-22

## 2022-09-22 VITALS
HEIGHT: 62 IN | OXYGEN SATURATION: 98 % | SYSTOLIC BLOOD PRESSURE: 144 MMHG | HEART RATE: 58 BPM | BODY MASS INDEX: 41.11 KG/M2 | WEIGHT: 223.4 LBS | DIASTOLIC BLOOD PRESSURE: 65 MMHG

## 2022-09-22 DIAGNOSIS — G47.33 OBSTRUCTIVE SLEEP APNEA, ADULT: Primary | ICD-10-CM

## 2022-09-22 PROCEDURE — 99213 OFFICE O/P EST LOW 20 MIN: CPT | Performed by: NURSE PRACTITIONER

## 2022-09-22 RX ORDER — CHLORAL HYDRATE 500 MG
CAPSULE ORAL
COMMUNITY

## 2022-09-22 NOTE — PROGRESS NOTES
Sleep Clinic Follow Up Note    Chief Complaint  Follow-up    Subjective     History of Present Illness (from previous encounter on 5/31/2022):  Patient says she has been noted by her  to have snoring and apneas.  She has had both noted for about 2 years.  She denies any recent changes in her weight.  She denies awakening gasping for breath.  She is not rested on arising in the morning.  She denies having morning headaches.  She has been found to have retinal hemorrhage.     She has awaken with a dry mouth.  She denies ever breaking her nose or having trouble breathing through her nose.  She denies kicking or jerking her legs at night.  She denies having chronic pain that keeps her awake.  She will have a fall asleep to sitting quietly during the day.  She denies having problems driving.     She goes to bed about 11:30 PM.  She will fall asleep in 30 minutes if she takes her alprazolam.  It takes her 2 hours to fall asleep if she does not use medications.  She awakens 4-5 times during the night.  She gets about 8 hours of sleep but is not rested.  She has had hypertension known for 6 years.  She denies any history of diabetes or coronary artery disease.  She does have a history of anemia. (End copied text).     -A home sleep test was obtained on 7/1/2022 revealing mild obstructive sleep apnea.  PAP therapy was initiated     Interval History:  Jeane Martines is a 66 y.o. female returns for follow up and compliance of CPAP therapy. The patient was last seen on 5/31/2022. Overall the patient feels good with regard to therapy. The device appears to be working appropriately. On average the patient sleeps 8 hours per night. The patient wakes 0 times per night.      The patient reports the following changes to their medical and medication history since they were last seen: None            Further details are as follows:    [place] Scale is: 2/24  Weight:    Weight change in the last year:  loss: 0 lbs    The  patient's relevant past medical, surgical, family, and social history reviewed and updated in Epic as appropriate.    PMH:    Past Medical History:   Diagnosis Date   • Allergic 3 yrs   • Anemia    • Depression    • Hypertension    • Thyroid disease      Past Surgical History:   Procedure Laterality Date   • COLONOSCOPY     • HYSTERECTOMY     • OOPHORECTOMY     • TUBAL ABDOMINAL LIGATION     • WISDOM TOOTH EXTRACTION       OB History        1    Para   1    Term   1            AB        Living           SAB        IAB        Ectopic        Molar        Multiple        Live Births                  No Known Allergies    MEDS:  Prior to Admission medications    Medication Sig Start Date End Date Taking? Authorizing Provider   ALPRAZolam (Xanax) 0.25 MG tablet Take 1 tablet by mouth At Night As Needed for Anxiety or Sleep.  Take 1 tablet every day by oral route as needed. 11/15/21  Yes Vijaya Figueroa PA-C   Cholecalciferol (VITAMIN D3) 50 MCG (2000 UT) tablet Take 1 tablet by mouth Daily. 19  Yes Vijaya Figueroa PA-C   citalopram (CeleXA) 20 MG tablet Take 1 tablet by mouth Daily. 3/8/22  Yes Vijaya Figueroa PA-C   cyanocobalamin 1000 MCG/ML injection ADMINISTER 1 MILLILITER INTRAMUSCULARLY ONCE MONTHLY FOR PERNICIOUS ANEMIA 3/8/22  Yes Vijaya Figueroa PA-C   fluticasone (FLONASE) 50 MCG/ACT nasal spray 2 sprays into the nostril(s) as directed by provider Daily.   Yes Provider, MD Lam   levothyroxine (Synthroid) 100 MCG tablet Take 1 tablet by mouth Every Morning. New dose, repeat TSH in [place] 3/11/22  Yes Vijaya Figueroa PA-C   loratadine (Claritin) 10 MG tablet Take 1 tablet by mouth Daily. Take 1 tablet every day . 22  Yes Vijaya Figueroa PA-C   metoprolol succinate XL (TOPROL-XL) 50 MG 24 hr tablet Take 1 tablet by mouth Daily. 21  Yes Vijaya Figueroa PA-C   Omega-3 Fatty Acids (fish oil) 1000 MG capsule capsule Take  by mouth Daily With Breakfast.   Yes  "Provider, MD Lam   Syringe/Needle, Disp, (B-D 3CC LUER-LEXX SYR 25GX1\") 25G X 1\" 3 ML misc Use with B12 solution monthly 3/8/22  Yes Vijaya Figueroa PA-C   Timolol Maleate, Once-Daily, 0.5 % solution  4/27/22  Yes Lam Mcdaniel MD   estradiol (Minivelle) 0.1 MG/24HR patch Place 1 patch on the skin as directed by provider 2 (Two) Times a Week for 30 days.  Apply 1 patch twice a week by transderm. route 3/10/22 4/9/22  Vijaya Figueroa PA-C   pantoprazole (PROTONIX) 40 MG EC tablet  4/14/22 9/22/22  ProviderLam MD         FH:  Family History   Problem Relation Age of Onset   • Heart attack Mother    • Hypertension Mother    • Thyroid disease Father    • Hypertension Father    • Hypertension Sister    • Breast cancer Sister 78   • Heart attack Brother    • Breast cancer Paternal Aunt         IN HER 30'S   • Diabetes Maternal Grandmother    • Obesity Other    • Stroke Other    • Heart disease Other    • Ovarian cancer Neg Hx        Objective   Vital Signs:  /65   Pulse 58   Ht 157.5 cm (62\")   Wt 101 kg (223 lb 6.4 oz)   SpO2 98%   BMI 40.86 kg/m²           Physical Exam  Vitals reviewed.   Constitutional:       Appearance: Normal appearance.   HENT:      Head: Normocephalic and atraumatic.      Nose: Nose normal.      Mouth/Throat:      Mouth: Mucous membranes are moist.   Cardiovascular:      Rate and Rhythm: Normal rate and regular rhythm.      Heart sounds: No murmur heard.    No friction rub. No gallop.   Pulmonary:      Effort: Pulmonary effort is normal. No respiratory distress.      Breath sounds: Normal breath sounds. No wheezing or rhonchi.   Neurological:      Mental Status: She is alert and oriented to person, place, and time.   Psychiatric:         Behavior: Behavior normal.         Mallampati Score: III (soft and hard palate and base of uvula visible)      CPAP Report:  AHI: 5.0/h  Days of Usage: 46/48 (96%)  Number of Days Greater than 4 hours: 44/48 (92%)  Average " time (days used): 7 hours 41 minutes  95th Percentile Pressure: 12.2 cm H2O  Settings: Auto set-minimum pressure 8 cm H2O, maximum pressure 18 cm H2O, EPR-full-time, EPR level 3, response soft      Result Review :              Assessment and Plan  This is a 66-year-old female who returns for follow-up and compliance of PAP therapy.  Patient reports much improvement after initiation of therapy.  She has had difficulty with retinal/vision problems and since her start of PAP therapy she has not needed a injection in her eye.  She is benefiting from and continues to wish to use Pap therapy.  AHI is within normal limits at 5.0, and she is in full compliance with a greater than 4-hour usage at 92%.  We will refill supplies and patient return for follow-up compliance in 1 year or sooner should she have questions or concerns.    Diagnoses and all orders for this visit:    1. Obstructive sleep apnea, adult (Primary)  -     PAP Therapy         The patient continues to use and benefit from CPAP therapy.    1. The patient was counseled regarding multimodal approach with healthy nutrition, healthy sleep, regular physical activity, social activities, counseling, and medications. Encouraged to practice lateral sleep position. Avoid alcohol and sedatives close to bedtime.     2.  We will refill supplies x1 year.  Return to clinic 1 year or sooner if symptoms warrant. I have reviewed the results of my evaluation and impression and discussed my recommendations in detail with the patient.    Follow Up  Return in about 1 year (around 9/22/2023).  Patient was given instructions and counseling regarding her condition or for health maintenance advice. Please see specific information pulled into the AVS if appropriate.       OKSANA Lorenzo, [facility]-BC  Pulmonology, Critical Care, and Sleep Medicine  Electronically signed by OKSANA Stewart, 09/22/22, 11:51 AM EDT.

## 2022-09-28 DIAGNOSIS — F41.9 ANXIETY: ICD-10-CM

## 2022-09-28 RX ORDER — ALPRAZOLAM 0.25 MG/1
0.25 TABLET ORAL NIGHTLY PRN
Qty: 14 TABLET | Refills: 0 | OUTPATIENT
Start: 2022-09-28

## 2022-09-28 RX ORDER — ALPRAZOLAM 0.25 MG/1
TABLET ORAL
Qty: 30 TABLET | OUTPATIENT
Start: 2022-09-28

## 2022-10-11 ENCOUNTER — OFFICE VISIT (OUTPATIENT)
Dept: FAMILY MEDICINE CLINIC | Facility: CLINIC | Age: 67
End: 2022-10-11

## 2022-10-11 VITALS
TEMPERATURE: 97.2 F | WEIGHT: 223 LBS | OXYGEN SATURATION: 98 % | HEART RATE: 74 BPM | BODY MASS INDEX: 38.07 KG/M2 | DIASTOLIC BLOOD PRESSURE: 84 MMHG | HEIGHT: 64 IN | SYSTOLIC BLOOD PRESSURE: 136 MMHG

## 2022-10-11 DIAGNOSIS — F41.9 ANXIETY: ICD-10-CM

## 2022-10-11 DIAGNOSIS — Z51.81 ENCOUNTER FOR THERAPEUTIC DRUG MONITORING: Primary | ICD-10-CM

## 2022-10-11 DIAGNOSIS — Z23 IMMUNIZATION DUE: ICD-10-CM

## 2022-10-11 DIAGNOSIS — E03.9 ACQUIRED HYPOTHYROIDISM: Primary | ICD-10-CM

## 2022-10-11 DIAGNOSIS — I10 ESSENTIAL HYPERTENSION: ICD-10-CM

## 2022-10-11 DIAGNOSIS — F41.1 GAD (GENERALIZED ANXIETY DISORDER): ICD-10-CM

## 2022-10-11 DIAGNOSIS — Z12.31 BREAST CANCER SCREENING BY MAMMOGRAM: ICD-10-CM

## 2022-10-11 PROCEDURE — 99213 OFFICE O/P EST LOW 20 MIN: CPT | Performed by: PHYSICIAN ASSISTANT

## 2022-10-11 PROCEDURE — 90662 IIV NO PRSV INCREASED AG IM: CPT | Performed by: PHYSICIAN ASSISTANT

## 2022-10-11 PROCEDURE — G0008 ADMIN INFLUENZA VIRUS VAC: HCPCS | Performed by: PHYSICIAN ASSISTANT

## 2022-10-11 RX ORDER — ALPRAZOLAM 0.25 MG/1
0.25 TABLET ORAL NIGHTLY PRN
Qty: 30 TABLET | Refills: 5 | Status: SHIPPED | OUTPATIENT
Start: 2022-10-11

## 2022-10-11 RX ORDER — METOPROLOL SUCCINATE 50 MG/1
50 TABLET, EXTENDED RELEASE ORAL DAILY
Qty: 30 TABLET | Refills: 11 | Status: SHIPPED | OUTPATIENT
Start: 2022-10-11

## 2022-10-11 RX ORDER — ALPRAZOLAM 0.25 MG/1
30 TABLET ORAL NIGHTLY PRN
Qty: 30 TABLET | Refills: 5 | Status: CANCELLED | OUTPATIENT
Start: 2022-10-11

## 2022-10-11 NOTE — PROGRESS NOTES
Subjective   Jeane Martines is a 67 y.o. female  Anxiety (Follow up on anxiety, refill on alprazolam) and Hypertension (Follow up BP refill on metoprolol)      History of Present Illness    Jeane Martines, date of birth 1955, presents today for follow-up of generalized anxiety disorder and hypertension. She states her vision has gotten worse due to her CRVO and she visits the eye doctor every 8 weeks. She continues to take her blood pressure medication and gets iron infusions. She uses a CPAP for sleep apnea. She takes alprazolam occasionally to help her rest but is out of it right now. She states she had some swelling in her leg last week. She will get a flu shot today.    The following portions of the patient's history were reviewed and updated as appropriate: allergies, current medications, past social history and problem list    Review of Systems   Constitutional: Positive for activity change. Negative for fatigue and unexpected weight change.   Eyes: Positive for visual disturbance.   Respiratory: Negative for cough, chest tightness and shortness of breath.    Cardiovascular: Positive for leg swelling ( left leg last week). Negative for chest pain and palpitations.   Gastrointestinal: Negative for nausea.   Skin: Negative for color change and rash.   Neurological: Negative for dizziness, syncope, weakness and headaches.   Psychiatric/Behavioral: Positive for sleep disturbance ( out of alprazolam). The patient is nervous/anxious.        Objective     Vitals:    10/11/22 1443   BP: 136/84   Pulse: 74   Temp: 97.2 °F (36.2 °C)   SpO2: 98%       Physical Exam  Vitals and nursing note reviewed.   Constitutional:       General: She is not in acute distress.     Appearance: Normal appearance. She is well-developed. She is obese. She is not ill-appearing, toxic-appearing or diaphoretic.      Comments: BMI 38   Neck:      Vascular: No carotid bruit or JVD.   Cardiovascular:      Rate and Rhythm: Normal rate and  regular rhythm.      Pulses: Normal pulses.      Heart sounds: Normal heart sounds. No murmur heard.  Pulmonary:      Effort: Pulmonary effort is normal. No respiratory distress.      Breath sounds: Normal breath sounds.   Abdominal:      Palpations: Abdomen is soft.      Tenderness: There is no abdominal tenderness.   Musculoskeletal:         General: No swelling.      Right lower leg: No edema.      Left lower leg: No edema.   Skin:     General: Skin is warm and dry.   Neurological:      Mental Status: She is alert and oriented to person, place, and time.      Gait: Gait normal.   Psychiatric:         Mood and Affect: Mood normal.         Behavior: Behavior normal.         Thought Content: Thought content normal.         Judgment: Judgment normal.         Assessment & Plan     Diagnoses and all orders for this visit:    1. Acquired hypothyroidism (Primary)  -     TSH; Future  -     T4, Free; Future    2. Breast cancer screening by mammogram  -     Mammo Screening Digital Tomosynthesis Bilateral With CAD; Future    3. Essential hypertension    4. INO (generalized anxiety disorder)    5. Immunization due    Other orders  -     metoprolol succinate XL (TOPROL-XL) 50 MG 24 hr tablet; Take 1 tablet by mouth Daily.  Dispense: 30 tablet; Refill: 11      The patient's blood pressure looks better today than it has in the last couple of times she has been in here. She will continue on her metoprolol.  We will send in her metoprolol and alprazolam. She will follow up with us in 6 months.    Controlled substance agreement updated today, urine drug screen obtained, will electronically submit a refill of current dosage of Xanax 0.25 mg nightly for anxiety and associated insomnia #30 with 5 refills per Dr. Garcia.  Follow-up in 6 months for recheck.    As part of this patient's treatment plan, patient will be prescribed controlled substances. The patient has been made aware of appropriate use of such medications, including  potential risk of somnolence, limited ability to drive and /or work safely, and potential for dependence or overdose. It has also been made clear that these medications are for use by this patient only, without concomitant use of alcohol or other substances unless prescribed.Controlled substance status of medication discussed with patient, discussed risks of medication including abuse potential and diversion potential and need to follow up for reevaluation appointment in order to receive further refills.    Part of this note may be an electronic transcription/translation of spoken language to printed text using the Dragon Dictation System.      Answers for HPI/ROS submitted by the patient on 10/6/2022  What is the primary reason for your visit?: Physical    Transcribed from ambient dictation for Vijaya Figueroa PA-C by Keerthi Mandujano.  10/11/22   15:45 EDT    Patient or patient representative verbalized consent to the visit recording.  I have personally performed the services described in this document as transcribed by the above individual, and it is both accurate and complete.  Vijaya Figueroa PA-C  10/11/2022  16:18 EDT

## 2022-10-25 ENCOUNTER — LAB (OUTPATIENT)
Dept: LAB | Facility: HOSPITAL | Age: 67
End: 2022-10-25

## 2022-10-25 DIAGNOSIS — E03.9 ACQUIRED HYPOTHYROIDISM: ICD-10-CM

## 2022-10-26 LAB
T4 FREE SERPL-MCNC: 1.82 NG/DL (ref 0.93–1.7)
TSH SERPL DL<=0.005 MIU/L-ACNC: 0.77 UIU/ML (ref 0.27–4.2)

## 2022-10-28 RX ORDER — LEVOTHYROXINE SODIUM 0.1 MG/1
100 TABLET ORAL
Qty: 90 TABLET | Refills: 3 | Status: SHIPPED | OUTPATIENT
Start: 2022-10-28

## 2022-11-14 ENCOUNTER — OFFICE VISIT (OUTPATIENT)
Dept: ONCOLOGY | Facility: CLINIC | Age: 67
End: 2022-11-14

## 2022-11-14 ENCOUNTER — LAB (OUTPATIENT)
Dept: LAB | Facility: HOSPITAL | Age: 67
End: 2022-11-14

## 2022-11-14 VITALS
RESPIRATION RATE: 16 BRPM | TEMPERATURE: 96.9 F | HEIGHT: 64 IN | OXYGEN SATURATION: 96 % | DIASTOLIC BLOOD PRESSURE: 70 MMHG | WEIGHT: 222 LBS | BODY MASS INDEX: 37.9 KG/M2 | SYSTOLIC BLOOD PRESSURE: 159 MMHG | HEART RATE: 64 BPM

## 2022-11-14 DIAGNOSIS — D50.9 IRON DEFICIENCY ANEMIA, UNSPECIFIED IRON DEFICIENCY ANEMIA TYPE: ICD-10-CM

## 2022-11-14 DIAGNOSIS — K90.9 IRON MALABSORPTION: ICD-10-CM

## 2022-11-14 DIAGNOSIS — E61.1 IRON DEFICIENCY: Primary | ICD-10-CM

## 2022-11-14 DIAGNOSIS — E53.8 B12 DEFICIENCY: ICD-10-CM

## 2022-11-14 LAB
BASOPHILS # BLD AUTO: 0.02 10*3/MM3 (ref 0–0.2)
BASOPHILS NFR BLD AUTO: 0.3 % (ref 0–1.5)
DEPRECATED RDW RBC AUTO: 40.8 FL (ref 37–54)
EOSINOPHIL # BLD AUTO: 0.26 10*3/MM3 (ref 0–0.4)
EOSINOPHIL NFR BLD AUTO: 3.5 % (ref 0.3–6.2)
ERYTHROCYTE [DISTWIDTH] IN BLOOD BY AUTOMATED COUNT: 13 % (ref 12.3–15.4)
FERRITIN SERPL-MCNC: 15.18 NG/ML (ref 13–150)
HCT VFR BLD AUTO: 37.3 % (ref 34–46.6)
HGB BLD-MCNC: 11.9 G/DL (ref 12–15.9)
IMM GRANULOCYTES # BLD AUTO: 0.02 10*3/MM3 (ref 0–0.05)
IMM GRANULOCYTES NFR BLD AUTO: 0.3 % (ref 0–0.5)
IRON 24H UR-MRATE: 23 MCG/DL (ref 37–145)
IRON SATN MFR SERPL: 5 % (ref 20–50)
LYMPHOCYTES # BLD AUTO: 2 10*3/MM3 (ref 0.7–3.1)
LYMPHOCYTES NFR BLD AUTO: 27.3 % (ref 19.6–45.3)
MCH RBC QN AUTO: 27.2 PG (ref 26.6–33)
MCHC RBC AUTO-ENTMCNC: 31.9 G/DL (ref 31.5–35.7)
MCV RBC AUTO: 85.2 FL (ref 79–97)
MONOCYTES # BLD AUTO: 0.6 10*3/MM3 (ref 0.1–0.9)
MONOCYTES NFR BLD AUTO: 8.2 % (ref 5–12)
NEUTROPHILS NFR BLD AUTO: 4.43 10*3/MM3 (ref 1.7–7)
NEUTROPHILS NFR BLD AUTO: 60.4 % (ref 42.7–76)
PLATELET # BLD AUTO: 307 10*3/MM3 (ref 140–450)
PMV BLD AUTO: 10.4 FL (ref 6–12)
RBC # BLD AUTO: 4.38 10*6/MM3 (ref 3.77–5.28)
TIBC SERPL-MCNC: 441 MCG/DL (ref 298–536)
TRANSFERRIN SERPL-MCNC: 296 MG/DL (ref 200–360)
WBC NRBC COR # BLD: 7.33 10*3/MM3 (ref 3.4–10.8)

## 2022-11-14 PROCEDURE — 83540 ASSAY OF IRON: CPT

## 2022-11-14 PROCEDURE — 99214 OFFICE O/P EST MOD 30 MIN: CPT | Performed by: NURSE PRACTITIONER

## 2022-11-14 PROCEDURE — 85025 COMPLETE CBC W/AUTO DIFF WBC: CPT

## 2022-11-14 PROCEDURE — 82728 ASSAY OF FERRITIN: CPT

## 2022-11-14 PROCEDURE — 84466 ASSAY OF TRANSFERRIN: CPT

## 2022-11-14 PROCEDURE — 36415 COLL VENOUS BLD VENIPUNCTURE: CPT

## 2022-11-14 NOTE — PROGRESS NOTES
DATE OF VISIT: 11/14/2022    REASON FOR VISIT: Followup for microcytic anemia     PROBLEM LIST:  1.  Anemia  2.  Microcytosis:  A.  Negative EGD and colonoscopy done by Dr. Andrade April 12, 2022  3.  Hypothyroid  4.  Hypertension  5.  Depression    HISTORY OF PRESENT ILLNESS: The patient is a very pleasant 67 y.o. female  with past medical history significant for microcytic anemia with iron deficiency diagnosed May 2021. She presented with fatigue, shortness of breath, and blurry vision. Her hemoglobin was found to be 9.2 with MCV 72. The patient received 6 doses of IV iron replacement using Ferrlecit. The patient is here today for scheduled follow up visit with labs.     SUBJECTIVE: The patient is here today by herself. She has been doing well since her last visit. She has been getting injections in her eye secondary to hemorrhage. She is being followed by a retina specialist. She feels like she is having a little more fatigue and she is wondering if she needs more iron replacement.  She had pill cam done with Dr. Andrade that per her report was normal.  She denies evidence of bleeding with bowels or dark stools. She denies fevers or chills.     Past History:  Medical History: has a past medical history of Allergic (3 yrs), Anemia, Depression, Hypertension, and Thyroid disease.   Surgical History: has a past surgical history that includes Oophorectomy; Hysterectomy; Tubal ligation; Colonoscopy; and [place] tooth extraction.   Family History: family history includes Breast cancer in her paternal aunt; Breast cancer (age of onset: 78) in her sister; Diabetes in her maternal grandmother; Heart attack in her brother and mother; Heart disease in an other family member; Hypertension in her father, mother, and sister; Obesity in an other family member; Stroke in an other family member; Thyroid disease in her father.   Social History: reports that she has never smoked. She has never used smokeless tobacco. She reports  "that she does not drink alcohol and does not use drugs.    (Not in a hospital admission)     Allergies: Patient has no known allergies.    Review of Systems   Constitutional: Positive for fatigue.   Eyes: Positive for visual disturbance.   Endocrine:        Night sweats since coming off HRT   All other systems reviewed and are negative.      PHYSICAL EXAMINATION:   /70   Pulse 64   Temp 96.9 °F (36.1 °C) (Temporal)   Resp 16   Ht 162.6 cm (64.02\")   Wt 101 kg (222 lb)   SpO2 96%   BMI 38.09 kg/m²    Pain Score    11/14/22 1345   PainSc: 0-No pain        ECOG score: 0        ECOG Performance Status: 0 - Asymptomatic  General Appearance:  alert, cooperative, no apparent distress and appears stated age   Lungs:   Clear to auscultation bilaterally; respirations regular, even, and unlabored bilaterally   Heart:  Regular rate and rhythm, no murmurs appreciated   Abdomen:   Soft, non-tender, non-distended and no organomegaly     Lab on 11/14/2022   Component Date Value Ref Range Status   • WBC 11/14/2022 7.33  3.40 - 10.80 10*3/mm3 Final   • RBC 11/14/2022 4.38  3.77 - 5.28 10*6/mm3 Final   • Hemoglobin 11/14/2022 11.9 (L)  12.0 - 15.9 g/dL Final   • Hematocrit 11/14/2022 37.3  34.0 - 46.6 % Final   • MCV 11/14/2022 85.2  79.0 - 97.0 fL Final   • MCH 11/14/2022 27.2  26.6 - 33.0 pg Final   • MCHC 11/14/2022 31.9  31.5 - 35.7 g/dL Final   • RDW 11/14/2022 13.0  12.3 - 15.4 % Final   • RDW-SD 11/14/2022 40.8  37.0 - 54.0 fl Final   • MPV 11/14/2022 10.4  6.0 - 12.0 fL Final   • Platelets 11/14/2022 307  140 - 450 10*3/mm3 Final   • Neutrophil % 11/14/2022 60.4  42.7 - 76.0 % Final   • Lymphocyte % 11/14/2022 27.3  19.6 - 45.3 % Final   • Monocyte % 11/14/2022 8.2  5.0 - 12.0 % Final   • Eosinophil % 11/14/2022 3.5  0.3 - 6.2 % Final   • Basophil % 11/14/2022 0.3  0.0 - 1.5 % Final   • Immature Grans % 11/14/2022 0.3  0.0 - 0.5 % Final   • Neutrophils, Absolute 11/14/2022 4.43  1.70 - 7.00 10*3/mm3 Final   • " Lymphocytes, Absolute 11/14/2022 2.00  0.70 - 3.10 10*3/mm3 Final   • Monocytes, Absolute 11/14/2022 0.60  0.10 - 0.90 10*3/mm3 Final   • Eosinophils, Absolute 11/14/2022 0.26  0.00 - 0.40 10*3/mm3 Final   • Basophils, Absolute 11/14/2022 0.02  0.00 - 0.20 10*3/mm3 Final   • Immature Grans, Absolute 11/14/2022 0.02  0.00 - 0.05 10*3/mm3 Final        No results found.    ASSESSMENT: The patient is a very pleasant 67 y.o. female  with microcytic anemia.        PLAN:  1.  Anemia:  A.  I reviewed the lab results from today with the patient. Her hemoglobin has decreased from 13.8 down to 11.9 today. Her MCV is normal at 85.2 with normal WBC and platelet count.   B. She will continue monthly vitamin B12 replacement.    2.  Iron deficiency:  A.  We will follow up on the iron study results and notify her of findings.   B.  We will arrange for repeat IV iron replacement if she has evidence of deficiency with ferritin less than 30 or saturation less than 10%.    C. She has completed her GI work up with capsule endoscopy done by Dr. Andrade. We will request a copy of this report for our chart.     3.  Hypothyroid:  A.  She will continue levothyroxine 100 mcg daily for hypothyroidism.     4.  Depression:  A.  She will continue citalopram 20 mg daily for depression.     5. Intraocular hemorrhage:  A. She will continue follow up with retina specialist with injections monthly.     FOLLOW UP:     Yuliana Wood, APRN  11/14/2022

## 2022-11-15 ENCOUNTER — TELEPHONE (OUTPATIENT)
Dept: ONCOLOGY | Facility: CLINIC | Age: 67
End: 2022-11-15

## 2022-11-15 DIAGNOSIS — D50.9 IRON DEFICIENCY ANEMIA, UNSPECIFIED IRON DEFICIENCY ANEMIA TYPE: Primary | ICD-10-CM

## 2022-11-15 DIAGNOSIS — K90.9 IRON MALABSORPTION: ICD-10-CM

## 2022-11-15 RX ORDER — FAMOTIDINE 10 MG/ML
20 INJECTION, SOLUTION INTRAVENOUS ONCE
Status: CANCELLED | OUTPATIENT
Start: 2022-11-29

## 2022-11-15 RX ORDER — FAMOTIDINE 10 MG/ML
20 INJECTION, SOLUTION INTRAVENOUS ONCE
Status: CANCELLED | OUTPATIENT
Start: 2022-11-22

## 2022-11-15 RX ORDER — DIPHENHYDRAMINE HCL 25 MG
25 CAPSULE ORAL ONCE
Status: CANCELLED | OUTPATIENT
Start: 2022-11-29

## 2022-11-15 RX ORDER — SODIUM CHLORIDE 9 MG/ML
250 INJECTION, SOLUTION INTRAVENOUS ONCE
Status: CANCELLED | OUTPATIENT
Start: 2022-11-22

## 2022-11-15 RX ORDER — DIPHENHYDRAMINE HCL 25 MG
25 CAPSULE ORAL ONCE
Status: CANCELLED | OUTPATIENT
Start: 2022-11-22

## 2022-11-15 RX ORDER — SODIUM CHLORIDE 9 MG/ML
250 INJECTION, SOLUTION INTRAVENOUS ONCE
Status: CANCELLED | OUTPATIENT
Start: 2022-11-29

## 2022-11-15 NOTE — TELEPHONE ENCOUNTER
I called and let patient know she needs set up for IV iron.  I advised that we will try for injectafer again.  She verbalized understanding.

## 2022-11-15 NOTE — TELEPHONE ENCOUNTER
----- Message from France Aden MD sent at 11/14/2022  4:40 PM EST -----  Needs IV iron.  France.    ----- Message -----  From: Lab, Background User  Sent: 11/14/2022   1:45 PM EST  To: France Aden MD

## 2022-11-21 ENCOUNTER — HOSPITAL ENCOUNTER (OUTPATIENT)
Dept: ONCOLOGY | Facility: HOSPITAL | Age: 67
Setting detail: INFUSION SERIES
Discharge: HOME | End: 2022-11-21

## 2022-11-21 VITALS
SYSTOLIC BLOOD PRESSURE: 156 MMHG | TEMPERATURE: 98.3 F | DIASTOLIC BLOOD PRESSURE: 66 MMHG | WEIGHT: 223 LBS | HEIGHT: 64 IN | RESPIRATION RATE: 16 BRPM | HEART RATE: 61 BPM | BODY MASS INDEX: 38.07 KG/M2

## 2022-11-21 DIAGNOSIS — K90.9 IRON MALABSORPTION: ICD-10-CM

## 2022-11-21 DIAGNOSIS — D50.9 IRON DEFICIENCY ANEMIA, UNSPECIFIED IRON DEFICIENCY ANEMIA TYPE: Primary | ICD-10-CM

## 2022-11-21 PROCEDURE — 96365 THER/PROPH/DIAG IV INF INIT: CPT

## 2022-11-21 PROCEDURE — 96374 THER/PROPH/DIAG INJ IV PUSH: CPT

## 2022-11-21 PROCEDURE — 25010000002 FERRIC CARBOXYMALTOSE 750 MG/15ML SOLUTION 15 ML VIAL: Performed by: INTERNAL MEDICINE

## 2022-11-21 PROCEDURE — 96375 TX/PRO/DX INJ NEW DRUG ADDON: CPT

## 2022-11-21 PROCEDURE — 63710000001 DIPHENHYDRAMINE PER 50 MG: Performed by: INTERNAL MEDICINE

## 2022-11-21 RX ORDER — FAMOTIDINE 10 MG/ML
20 INJECTION, SOLUTION INTRAVENOUS ONCE
Status: COMPLETED | OUTPATIENT
Start: 2022-11-21 | End: 2022-11-21

## 2022-11-21 RX ORDER — SODIUM CHLORIDE 9 MG/ML
250 INJECTION, SOLUTION INTRAVENOUS ONCE
Status: COMPLETED | OUTPATIENT
Start: 2022-11-21 | End: 2022-11-21

## 2022-11-21 RX ORDER — DIPHENHYDRAMINE HCL 25 MG
25 CAPSULE ORAL ONCE
Status: COMPLETED | OUTPATIENT
Start: 2022-11-21 | End: 2022-11-21

## 2022-11-21 RX ADMIN — DIPHENHYDRAMINE HYDROCHLORIDE 25 MG: 25 CAPSULE ORAL at 13:53

## 2022-11-21 RX ADMIN — FAMOTIDINE 20 MG: 10 INJECTION INTRAVENOUS at 13:54

## 2022-11-21 RX ADMIN — FERRIC CARBOXYMALTOSE INJECTION 750 MG: 50 INJECTION, SOLUTION INTRAVENOUS at 14:27

## 2022-11-21 RX ADMIN — SODIUM CHLORIDE 250 ML: 9 INJECTION, SOLUTION INTRAVENOUS at 13:53

## 2022-11-29 ENCOUNTER — HOSPITAL ENCOUNTER (OUTPATIENT)
Dept: ONCOLOGY | Facility: HOSPITAL | Age: 67
Setting detail: INFUSION SERIES
Discharge: HOME | End: 2022-11-29

## 2022-11-29 VITALS
HEIGHT: 64 IN | RESPIRATION RATE: 18 BRPM | SYSTOLIC BLOOD PRESSURE: 169 MMHG | BODY MASS INDEX: 38.07 KG/M2 | TEMPERATURE: 97.9 F | DIASTOLIC BLOOD PRESSURE: 63 MMHG | WEIGHT: 223 LBS | HEART RATE: 53 BPM

## 2022-11-29 DIAGNOSIS — D50.9 IRON DEFICIENCY ANEMIA, UNSPECIFIED IRON DEFICIENCY ANEMIA TYPE: Primary | ICD-10-CM

## 2022-11-29 DIAGNOSIS — K90.9 IRON MALABSORPTION: ICD-10-CM

## 2022-11-29 PROCEDURE — 96375 TX/PRO/DX INJ NEW DRUG ADDON: CPT

## 2022-11-29 PROCEDURE — 96365 THER/PROPH/DIAG IV INF INIT: CPT

## 2022-11-29 PROCEDURE — 63710000001 DIPHENHYDRAMINE PER 50 MG: Performed by: INTERNAL MEDICINE

## 2022-11-29 PROCEDURE — 25010000002 FERRIC CARBOXYMALTOSE 750 MG/15ML SOLUTION 15 ML VIAL: Performed by: INTERNAL MEDICINE

## 2022-11-29 PROCEDURE — 96374 THER/PROPH/DIAG INJ IV PUSH: CPT

## 2022-11-29 RX ORDER — DIPHENHYDRAMINE HCL 25 MG
25 CAPSULE ORAL ONCE
Status: COMPLETED | OUTPATIENT
Start: 2022-11-29 | End: 2022-11-29

## 2022-11-29 RX ORDER — SODIUM CHLORIDE 9 MG/ML
250 INJECTION, SOLUTION INTRAVENOUS ONCE
Status: COMPLETED | OUTPATIENT
Start: 2022-11-29 | End: 2022-11-29

## 2022-11-29 RX ORDER — FAMOTIDINE 10 MG/ML
20 INJECTION, SOLUTION INTRAVENOUS ONCE
Status: COMPLETED | OUTPATIENT
Start: 2022-11-29 | End: 2022-11-29

## 2022-11-29 RX ADMIN — SODIUM CHLORIDE 250 ML: 9 INJECTION, SOLUTION INTRAVENOUS at 13:40

## 2022-11-29 RX ADMIN — DIPHENHYDRAMINE HYDROCHLORIDE 25 MG: 25 CAPSULE ORAL at 13:53

## 2022-11-29 RX ADMIN — FERRIC CARBOXYMALTOSE INJECTION 750 MG: 50 INJECTION, SOLUTION INTRAVENOUS at 14:00

## 2022-11-29 RX ADMIN — FAMOTIDINE 20 MG: 10 INJECTION, SOLUTION INTRAVENOUS at 13:40

## 2022-12-08 ENCOUNTER — HOSPITAL ENCOUNTER (OUTPATIENT)
Dept: MAMMOGRAPHY | Facility: HOSPITAL | Age: 67
Discharge: HOME | End: 2022-12-08
Admitting: PHYSICIAN ASSISTANT

## 2022-12-08 DIAGNOSIS — Z12.31 BREAST CANCER SCREENING BY MAMMOGRAM: ICD-10-CM

## 2022-12-08 PROCEDURE — 77067 SCR MAMMO BI INCL CAD: CPT

## 2022-12-08 PROCEDURE — 77063 BREAST TOMOSYNTHESIS BI: CPT

## 2022-12-08 PROCEDURE — 77067 SCR MAMMO BI INCL CAD: CPT | Performed by: RADIOLOGY

## 2022-12-08 PROCEDURE — 77063 BREAST TOMOSYNTHESIS BI: CPT | Performed by: RADIOLOGY

## 2023-03-08 RX ORDER — CITALOPRAM 20 MG/1
TABLET ORAL
Qty: 90 TABLET | Refills: 3 | Status: SHIPPED | OUTPATIENT
Start: 2023-03-08

## 2023-04-11 ENCOUNTER — OFFICE VISIT (OUTPATIENT)
Dept: FAMILY MEDICINE CLINIC | Facility: CLINIC | Age: 68
End: 2023-04-11
Payer: MEDICARE

## 2023-04-11 ENCOUNTER — LAB (OUTPATIENT)
Dept: FAMILY MEDICINE CLINIC | Facility: CLINIC | Age: 68
End: 2023-04-11
Payer: MEDICARE

## 2023-04-11 VITALS
HEART RATE: 68 BPM | BODY MASS INDEX: 38.76 KG/M2 | HEIGHT: 64 IN | WEIGHT: 227 LBS | DIASTOLIC BLOOD PRESSURE: 76 MMHG | SYSTOLIC BLOOD PRESSURE: 134 MMHG | TEMPERATURE: 97.8 F | OXYGEN SATURATION: 99 %

## 2023-04-11 DIAGNOSIS — E03.9 HYPOTHYROIDISM (ACQUIRED): Primary | ICD-10-CM

## 2023-04-11 DIAGNOSIS — E03.9 HYPOTHYROIDISM (ACQUIRED): ICD-10-CM

## 2023-04-11 DIAGNOSIS — F41.9 ANXIETY: ICD-10-CM

## 2023-04-11 PROCEDURE — 3075F SYST BP GE 130 - 139MM HG: CPT | Performed by: PHYSICIAN ASSISTANT

## 2023-04-11 PROCEDURE — 99213 OFFICE O/P EST LOW 20 MIN: CPT | Performed by: PHYSICIAN ASSISTANT

## 2023-04-11 PROCEDURE — 1160F RVW MEDS BY RX/DR IN RCRD: CPT | Performed by: PHYSICIAN ASSISTANT

## 2023-04-11 PROCEDURE — 3078F DIAST BP <80 MM HG: CPT | Performed by: PHYSICIAN ASSISTANT

## 2023-04-11 PROCEDURE — 1159F MED LIST DOCD IN RCRD: CPT | Performed by: PHYSICIAN ASSISTANT

## 2023-04-11 RX ORDER — NEEDLES, SAFETY 22GX1 1/2"
1 NEEDLE, DISPOSABLE MISCELLANEOUS
Qty: 3 EACH | Refills: 4 | Status: SHIPPED | OUTPATIENT
Start: 2023-04-11 | End: 2023-04-11 | Stop reason: ALTCHOICE

## 2023-04-11 RX ORDER — METOPROLOL SUCCINATE 50 MG/1
50 TABLET, EXTENDED RELEASE ORAL DAILY
Qty: 90 TABLET | Refills: 3 | Status: SHIPPED | OUTPATIENT
Start: 2023-04-11

## 2023-04-11 RX ORDER — CYANOCOBALAMIN 1000 UG/ML
INJECTION, SOLUTION INTRAMUSCULAR; SUBCUTANEOUS
Qty: 1 ML | Refills: 11 | Status: SHIPPED | OUTPATIENT
Start: 2023-04-11

## 2023-04-11 RX ORDER — LORATADINE 10 MG/1
10 TABLET ORAL DAILY
Qty: 90 TABLET | Refills: 3 | Status: SHIPPED | OUTPATIENT
Start: 2023-04-11

## 2023-04-11 RX ORDER — NEEDLES, SAFETY 22GX1 1/2"
1 NEEDLE, DISPOSABLE MISCELLANEOUS
Qty: 3 EACH | Refills: 4 | Status: SHIPPED | OUTPATIENT
Start: 2023-04-11

## 2023-04-11 NOTE — PROGRESS NOTES
"Subjective   Jeane Martines is a 67 y.o. female  Anxiety (Refill on alprazolam for anxiety) and b12 deficiency (Refill on b12 and syringes )      History of Present Illness  The patient is a 67-year-old female seen today for follow-up on generalized anxiety disorder and vitamin B12 deficiency as well as follow up on abnormal thyroid labs from 10/2022.    The patient confirms she is doing well with her anxiety medication, feels her mood is good, and is able to manage her anxiety and worries. She confirms she is sleeping well at night. She states that she has sleep apnea and she uses a CPAP machine, which is helping her sleep apnea.     She continues to take citalopram at night. Shep is taking alprazolam 1 tablet every night to help her to \"turn her brain off a little bit\" and takes it most nights. She denies feeling groggy, being off-balance, or waking up in the middle of the night in association with taking alprazolam.     She confirms she is doing vitamin B12 injections once a month. She Denies having trouble administering them at home to herself. She has someone at home to help her administer injections. She asks to have this office examine her syringe. She notes that she has recieved some of the smaller syringes as well. Dr. Lewis is monitoring her blood count. She has a follow-up appointment with Dr. Lewis in 05/2023.    She has been having troule with her eyes and adds she is receiving injections in her eyes from Dr. Harris at [facility]. She sees Dr. Arce every 6 months.     She confirms she buys Flonase over-the-counter.     The following portions of the patient's history were reviewed and updated as appropriate: allergies, current medications, past social history and problem list    Review of Systems   Constitutional: Negative for fatigue and unexpected weight change.   Eyes: Negative for visual disturbance.   Cardiovascular: Negative for chest pain and palpitations.   Gastrointestinal: " "Negative for constipation and diarrhea.   Endocrine: Negative for cold intolerance and heat intolerance.   Neurological: Negative for tremors.   Psychiatric/Behavioral: Negative for agitation. The patient is not nervous/anxious.        Objective     Vitals:    04/11/23 1342   BP: 134/76   Pulse: 68   Temp: 97.8 °F (36.6 °C)   SpO2: 99%       Physical Exam  Vitals and nursing note reviewed.   Constitutional:       General: She is not in acute distress.     Appearance: Normal appearance. She is well-developed. She is obese. She is not ill-appearing, toxic-appearing or diaphoretic.      Comments: BMI38   HENT:      Head: Normocephalic and atraumatic.   Eyes:      Conjunctiva/sclera: Conjunctivae normal.   Pulmonary:      Effort: Pulmonary effort is normal. No respiratory distress.   Skin:     General: Skin is dry.      Coloration: Skin is not pale.      Findings: No erythema or rash.   Neurological:      Mental Status: She is alert and oriented to person, place, and time.      Coordination: Coordination normal.   Psychiatric:         Attention and Perception: She is attentive.         Mood and Affect: Mood normal.         Speech: Speech normal.         Behavior: Behavior normal.         Thought Content: Thought content normal.         Judgment: Judgment normal.         Assessment & Plan     Diagnoses and all orders for this visit:    1. Hypothyroidism (acquired) (Primary)  -     TSH; Future  -     T4, Free; Future    2. Anxiety    Other orders  -     cyanocobalamin 1000 MCG/ML injection; ADMINISTER 1 MILLILITER INTRAMUSCULARLY ONCE MONTHLY FOR PERNICIOUS ANEMIA  Dispense: 1 mL; Refill: 11  -     Discontinue: Syringe/Needle, Disp, (B-D SYRINGE/NEEDLE 1CC/25GX5/8) 25G X 5/8\" 1 ML misc; 1 mL Every 30 (Thirty) Days.  Dispense: 3 each; Refill: 4  -     Syringe/Needle, Disp, (B-D SYRINGE/NEEDLE 1CC/25GX5/8) 25G X 5/8\" 1 ML misc; 1 mL Every 30 (Thirty) Days.  Dispense: 3 each; Refill: 4  -     loratadine (Claritin) 10 MG " tablet; Take 1 tablet by mouth Daily. Take 1 tablet every day .  Dispense: 90 tablet; Refill: 3  -     metoprolol succinate XL (TOPROL-XL) 50 MG 24 hr tablet; Take 1 tablet by mouth Daily.  Dispense: 90 tablet; Refill: 3     1. Anxiety  - She will continue taking Celexa once daily and alprazolam as needed.    We will electronically submit a refill of current dosage of Xanax 0.25 mg 1 daily as needed for anxiety #30 with 5 refills per Dr. Garcia.  Follow-up in 6 months for recheck.  2. Vitamin B12 deficiency  - Patient has an excessively long syringe.   - [place] syringes are prescribed for the patient.   - She will continue taking vitamin B12 injections once monthly.    3. Health maintenance  - We will obtain laboratory work, with the inclusion of a thyroid panel.  - Patient is prescribed a 90-day supply of loratadine.     Answers for HPI/ROS submitted by the patient on 4/4/2023  Please describe your symptoms.: Thyroid check  Have you had these symptoms before?: Yes  How long have you been having these symptoms?: Greater than 2 weeks  What is the primary reason for your visit?: Other    Transcribed from ambient dictation for Vijaya Figueroa PA-C by Yumiko Gloria.  04/11/23   16:55 EDT    Patient or patient representative verbalized consent to the visit recording.  I have personally performed the services described in this document as transcribed by the above individual, and it is both accurate and complete.  Vijaya Figueroa PA-C  4/12/2023  10:00 EDT

## 2023-04-12 DIAGNOSIS — F41.9 ANXIETY: ICD-10-CM

## 2023-04-12 LAB
T4 FREE SERPL-MCNC: 1.66 NG/DL (ref 0.93–1.7)
TSH SERPL DL<=0.005 MIU/L-ACNC: 0.56 UIU/ML (ref 0.27–4.2)

## 2023-04-12 RX ORDER — ALPRAZOLAM 0.25 MG/1
0.25 TABLET ORAL NIGHTLY PRN
Qty: 30 TABLET | Refills: 5 | Status: SHIPPED | OUTPATIENT
Start: 2023-04-12

## 2023-05-04 ENCOUNTER — TELEPHONE (OUTPATIENT)
Dept: ONCOLOGY | Facility: CLINIC | Age: 68
End: 2023-05-04
Payer: MEDICARE

## 2023-05-04 NOTE — TELEPHONE ENCOUNTER
Caller: Jeane Martines    Relationship to patient: Self    Best call back number:[phone]     Chief complaint:PATIENT CANNOT COME TO RESCHEDULED APPT TIME ON 5/15/23 DUE TO ANOTHER APPT    Type of visit:LABS AND FU    Requested date: REQUESTING Tuesday 5/16/23, IF NOT POSSIBLE PLEASE RETURN CALL AND PATIENT WILL ATTEMPT TO MOVE OTHER APPT

## 2023-05-16 ENCOUNTER — OFFICE VISIT (OUTPATIENT)
Dept: ONCOLOGY | Facility: CLINIC | Age: 68
End: 2023-05-16
Payer: MEDICARE

## 2023-05-16 ENCOUNTER — TELEPHONE (OUTPATIENT)
Dept: ONCOLOGY | Facility: CLINIC | Age: 68
End: 2023-05-16

## 2023-05-16 ENCOUNTER — LAB (OUTPATIENT)
Dept: LAB | Facility: HOSPITAL | Age: 68
End: 2023-05-16
Payer: MEDICARE

## 2023-05-16 VITALS
BODY MASS INDEX: 38.76 KG/M2 | RESPIRATION RATE: 18 BRPM | OXYGEN SATURATION: 95 % | DIASTOLIC BLOOD PRESSURE: 60 MMHG | HEART RATE: 76 BPM | SYSTOLIC BLOOD PRESSURE: 130 MMHG | WEIGHT: 227 LBS | TEMPERATURE: 96.9 F | HEIGHT: 64 IN

## 2023-05-16 DIAGNOSIS — K90.9 IRON MALABSORPTION: ICD-10-CM

## 2023-05-16 DIAGNOSIS — D50.9 IRON DEFICIENCY ANEMIA, UNSPECIFIED IRON DEFICIENCY ANEMIA TYPE: ICD-10-CM

## 2023-05-16 DIAGNOSIS — D50.9 IRON DEFICIENCY ANEMIA, UNSPECIFIED IRON DEFICIENCY ANEMIA TYPE: Primary | ICD-10-CM

## 2023-05-16 DIAGNOSIS — E61.1 IRON DEFICIENCY: ICD-10-CM

## 2023-05-16 LAB
BASOPHILS # BLD AUTO: 0.04 10*3/MM3 (ref 0–0.2)
BASOPHILS NFR BLD AUTO: 0.5 % (ref 0–1.5)
DEPRECATED RDW RBC AUTO: 44.5 FL (ref 37–54)
EOSINOPHIL # BLD AUTO: 0.3 10*3/MM3 (ref 0–0.4)
EOSINOPHIL NFR BLD AUTO: 4.1 % (ref 0.3–6.2)
ERYTHROCYTE [DISTWIDTH] IN BLOOD BY AUTOMATED COUNT: 15.2 % (ref 12.3–15.4)
FERRITIN SERPL-MCNC: 11.48 NG/ML (ref 13–150)
HCT VFR BLD AUTO: 35.8 % (ref 34–46.6)
HGB BLD-MCNC: 10.8 G/DL (ref 12–15.9)
IMM GRANULOCYTES # BLD AUTO: 0.02 10*3/MM3 (ref 0–0.05)
IMM GRANULOCYTES NFR BLD AUTO: 0.3 % (ref 0–0.5)
IRON 24H UR-MRATE: 24 MCG/DL (ref 37–145)
IRON SATN MFR SERPL: 5 % (ref 20–50)
LYMPHOCYTES # BLD AUTO: 2.02 10*3/MM3 (ref 0.7–3.1)
LYMPHOCYTES NFR BLD AUTO: 27.7 % (ref 19.6–45.3)
MCH RBC QN AUTO: 24.2 PG (ref 26.6–33)
MCHC RBC AUTO-ENTMCNC: 30.2 G/DL (ref 31.5–35.7)
MCV RBC AUTO: 80.1 FL (ref 79–97)
MONOCYTES # BLD AUTO: 0.72 10*3/MM3 (ref 0.1–0.9)
MONOCYTES NFR BLD AUTO: 9.9 % (ref 5–12)
NEUTROPHILS NFR BLD AUTO: 4.18 10*3/MM3 (ref 1.7–7)
NEUTROPHILS NFR BLD AUTO: 57.5 % (ref 42.7–76)
PLATELET # BLD AUTO: 306 10*3/MM3 (ref 140–450)
PMV BLD AUTO: 10.4 FL (ref 6–12)
RBC # BLD AUTO: 4.47 10*6/MM3 (ref 3.77–5.28)
TIBC SERPL-MCNC: 448 MCG/DL (ref 298–536)
TRANSFERRIN SERPL-MCNC: 301 MG/DL (ref 200–360)
WBC NRBC COR # BLD: 7.28 10*3/MM3 (ref 3.4–10.8)

## 2023-05-16 PROCEDURE — 85025 COMPLETE CBC W/AUTO DIFF WBC: CPT

## 2023-05-16 PROCEDURE — 84466 ASSAY OF TRANSFERRIN: CPT

## 2023-05-16 PROCEDURE — 99214 OFFICE O/P EST MOD 30 MIN: CPT | Performed by: INTERNAL MEDICINE

## 2023-05-16 PROCEDURE — 36415 COLL VENOUS BLD VENIPUNCTURE: CPT

## 2023-05-16 PROCEDURE — 1126F AMNT PAIN NOTED NONE PRSNT: CPT | Performed by: INTERNAL MEDICINE

## 2023-05-16 PROCEDURE — 83540 ASSAY OF IRON: CPT

## 2023-05-16 PROCEDURE — 3075F SYST BP GE 130 - 139MM HG: CPT | Performed by: INTERNAL MEDICINE

## 2023-05-16 PROCEDURE — 3078F DIAST BP <80 MM HG: CPT | Performed by: INTERNAL MEDICINE

## 2023-05-16 PROCEDURE — 82728 ASSAY OF FERRITIN: CPT

## 2023-05-16 RX ORDER — SODIUM CHLORIDE 9 MG/ML
250 INJECTION, SOLUTION INTRAVENOUS ONCE
OUTPATIENT
Start: 2023-05-24

## 2023-05-16 RX ORDER — FAMOTIDINE 10 MG/ML
20 INJECTION, SOLUTION INTRAVENOUS ONCE
OUTPATIENT
Start: 2023-05-31

## 2023-05-16 RX ORDER — SYRINGE WITH NEEDLE, 1 ML 25GX1"
SYRINGE, EMPTY DISPOSABLE MISCELLANEOUS
COMMUNITY
Start: 2023-04-11

## 2023-05-16 RX ORDER — DIPHENHYDRAMINE HCL 25 MG
25 CAPSULE ORAL ONCE
OUTPATIENT
Start: 2023-05-31

## 2023-05-16 RX ORDER — SODIUM CHLORIDE 9 MG/ML
250 INJECTION, SOLUTION INTRAVENOUS ONCE
OUTPATIENT
Start: 2023-05-31

## 2023-05-16 RX ORDER — DIPHENHYDRAMINE HCL 25 MG
25 CAPSULE ORAL ONCE
OUTPATIENT
Start: 2023-05-24

## 2023-05-16 RX ORDER — FAMOTIDINE 10 MG/ML
20 INJECTION, SOLUTION INTRAVENOUS ONCE
OUTPATIENT
Start: 2023-05-24

## 2023-05-16 RX ORDER — TIMOLOL MALEATE 5 MG/ML
SOLUTION/ DROPS OPHTHALMIC
COMMUNITY
Start: 2023-04-17 | End: 2023-05-16 | Stop reason: SDUPTHER

## 2023-05-16 NOTE — PROGRESS NOTES
DATE OF VISIT: 5/16/2023    REASON FOR VISIT: Followup for microcytic anemia     PROBLEM LIST:  1.  Anemia  2.  Microcytosis:  A.  Negative EGD and colonoscopy done by Dr. Andrade April 12, 2022  3.  Hypothyroid  4.  Hypertension  5.  Depression    HISTORY OF PRESENT ILLNESS: The patient is a very pleasant 67 y.o. female  with past medical history significant for microcytic anemia with iron deficiency diagnosed May 2021. She presented with fatigue, shortness of breath, and blurry vision. Her hemoglobin was found to be 9.2 with MCV 72. The patient received 6 doses of IV iron replacement using Ferrlecit. The patient is here today for scheduled follow up visit with labs.     SUBJECTIVE: Jeane is here today by herself.  She is complaining of mild fatigue.  Denies any bleeding.  She has not been compliant with her B12 replacement.    Past History:  Medical History: has a past medical history of Allergic (3 yrs), Anemia, Depression, Hypertension, Thyroid disease, and Visual impairment.   Surgical History: has a past surgical history that includes Oophorectomy; Hysterectomy; Tubal ligation; Colonoscopy; and [place] tooth extraction.   Family History: family history includes Breast cancer in her paternal aunt; Breast cancer (age of onset: 78) in her sister; Diabetes in her maternal grandmother; Heart attack in her brother and mother; Heart disease in an other family member; Hypertension in her father, mother, and sister; Obesity in an other family member; Stroke in an other family member; Thyroid disease in her father; Vision loss in her father.   Social History: reports that she has never smoked. She has never used smokeless tobacco. She reports that she does not drink alcohol and does not use drugs.    (Not in a hospital admission)     Allergies: Patient has no known allergies.    Review of Systems   Constitutional: Positive for fatigue.   Neurological: Negative.    Psychiatric/Behavioral: The patient is  nervous/anxious.        PHYSICAL EXAMINATION:   There were no vitals taken for this visit.   There were no vitals filed for this visit.              ECOG Performance Status: 1 - Symptomatic but completely ambulatory  General Appearance:  alert, cooperative, no apparent distress and appears stated age   Lungs:   Clear to auscultation bilaterally; respirations regular, even, and unlabored bilaterally   Heart:  Regular rate and rhythm, no murmurs appreciated   Abdomen:   Soft, non-tender, non-distended and no organomegaly     No visits with results within 2 Week(s) from this visit.   Latest known visit with results is:   Lab on 04/11/2023   Component Date Value Ref Range Status   • Free T4 04/11/2023 1.66  0.93 - 1.70 ng/dL Final    Results may be falsely increased if patient taking Biotin.   • TSH 04/11/2023 0.559  0.270 - 4.200 uIU/mL Final        No results found.    ASSESSMENT: The patient is a very pleasant 67 y.o. female  with microcytic anemia.        PLAN:  1.  Anemia:  A.  I reviewed the lab results from today with the patient.  Hemoglobin down to 10.8.  B. She will continue monthly vitamin B12 replacement.  I asked her to be more compliant with her treatment.    2.  Iron deficiency:  A.  We will follow up on the iron study results and notify her of findings.   B.  We will arrange for repeat IV iron replacement if she has evidence of deficiency with ferritin less than 30 or saturation less than 10%.    C. She has completed her GI work up with capsule endoscopy done by Dr. Andrade. We will request a copy of this report for our chart.     3.  Hypothyroid:  A.  She will continue levothyroxine 100 mcg daily for hypothyroidism.     4.  Depression:  A.  She will continue citalopram 20 mg daily for depression.     5. Intraocular hemorrhage:  A. She will continue follow up with retina specialist with injections monthly.     FOLLOW UP: 6 months with CBC vitamin B12 and iron profile.    France Aden MD  5/16/2023

## 2023-05-16 NOTE — TELEPHONE ENCOUNTER
I called and let patient know that her labs show she is iron deficient again and needs more iron. Advised office will call her with appointments.  She verbalized understanding.

## 2023-05-19 RX ORDER — LEVOTHYROXINE SODIUM 0.1 MG/1
100 TABLET ORAL
Qty: 90 TABLET | Refills: 3 | Status: SHIPPED | OUTPATIENT
Start: 2023-05-19

## 2023-05-25 ENCOUNTER — HOSPITAL ENCOUNTER (OUTPATIENT)
Dept: ONCOLOGY | Facility: HOSPITAL | Age: 68
Discharge: HOME | End: 2023-05-25
Admitting: INTERNAL MEDICINE
Payer: MEDICARE

## 2023-05-25 VITALS
HEIGHT: 64 IN | BODY MASS INDEX: 38.93 KG/M2 | TEMPERATURE: 98.2 F | HEART RATE: 57 BPM | RESPIRATION RATE: 16 BRPM | WEIGHT: 228 LBS | SYSTOLIC BLOOD PRESSURE: 163 MMHG | DIASTOLIC BLOOD PRESSURE: 55 MMHG

## 2023-05-25 DIAGNOSIS — K90.9 IRON MALABSORPTION: ICD-10-CM

## 2023-05-25 DIAGNOSIS — D50.9 IRON DEFICIENCY ANEMIA, UNSPECIFIED IRON DEFICIENCY ANEMIA TYPE: Primary | ICD-10-CM

## 2023-05-25 PROCEDURE — 96375 TX/PRO/DX INJ NEW DRUG ADDON: CPT

## 2023-05-25 PROCEDURE — 63710000001 DIPHENHYDRAMINE PER 50 MG: Performed by: INTERNAL MEDICINE

## 2023-05-25 PROCEDURE — 25010000002 FERRIC CARBOXYMALTOSE 750 MG/15ML SOLUTION 15 ML VIAL: Performed by: INTERNAL MEDICINE

## 2023-05-25 PROCEDURE — 96374 THER/PROPH/DIAG INJ IV PUSH: CPT

## 2023-05-25 RX ORDER — DIPHENHYDRAMINE HCL 25 MG
25 CAPSULE ORAL ONCE
Status: COMPLETED | OUTPATIENT
Start: 2023-05-25 | End: 2023-05-25

## 2023-05-25 RX ORDER — SODIUM CHLORIDE 9 MG/ML
250 INJECTION, SOLUTION INTRAVENOUS ONCE
Status: COMPLETED | OUTPATIENT
Start: 2023-05-25 | End: 2023-05-25

## 2023-05-25 RX ORDER — FAMOTIDINE 10 MG/ML
20 INJECTION, SOLUTION INTRAVENOUS ONCE
Status: COMPLETED | OUTPATIENT
Start: 2023-05-25 | End: 2023-05-25

## 2023-05-25 RX ADMIN — FERRIC CARBOXYMALTOSE INJECTION 750 MG: 50 INJECTION, SOLUTION INTRAVENOUS at 14:04

## 2023-05-25 RX ADMIN — FAMOTIDINE 20 MG: 10 INJECTION INTRAVENOUS at 13:28

## 2023-05-25 RX ADMIN — SODIUM CHLORIDE 250 ML: 9 INJECTION, SOLUTION INTRAVENOUS at 13:27

## 2023-05-25 RX ADMIN — DIPHENHYDRAMINE HYDROCHLORIDE 25 MG: 25 CAPSULE ORAL at 13:28

## 2023-06-01 ENCOUNTER — HOSPITAL ENCOUNTER (OUTPATIENT)
Dept: ONCOLOGY | Facility: HOSPITAL | Age: 68
Discharge: HOME | End: 2023-06-01
Admitting: INTERNAL MEDICINE

## 2023-06-01 VITALS
WEIGHT: 228 LBS | RESPIRATION RATE: 16 BRPM | TEMPERATURE: 97.6 F | HEIGHT: 64 IN | SYSTOLIC BLOOD PRESSURE: 138 MMHG | HEART RATE: 67 BPM | DIASTOLIC BLOOD PRESSURE: 63 MMHG | BODY MASS INDEX: 38.93 KG/M2

## 2023-06-01 DIAGNOSIS — D50.9 IRON DEFICIENCY ANEMIA, UNSPECIFIED IRON DEFICIENCY ANEMIA TYPE: Primary | ICD-10-CM

## 2023-06-01 DIAGNOSIS — K90.9 IRON MALABSORPTION: ICD-10-CM

## 2023-06-01 PROCEDURE — 96375 TX/PRO/DX INJ NEW DRUG ADDON: CPT

## 2023-06-01 PROCEDURE — 25010000002 FERRIC CARBOXYMALTOSE 750 MG/15ML SOLUTION 15 ML VIAL: Performed by: INTERNAL MEDICINE

## 2023-06-01 PROCEDURE — 63710000001 DIPHENHYDRAMINE PER 50 MG: Performed by: INTERNAL MEDICINE

## 2023-06-01 PROCEDURE — 96374 THER/PROPH/DIAG INJ IV PUSH: CPT

## 2023-06-01 RX ORDER — SODIUM CHLORIDE 9 MG/ML
250 INJECTION, SOLUTION INTRAVENOUS ONCE
Status: COMPLETED | OUTPATIENT
Start: 2023-06-01 | End: 2023-06-01

## 2023-06-01 RX ORDER — FAMOTIDINE 10 MG/ML
20 INJECTION, SOLUTION INTRAVENOUS ONCE
Status: COMPLETED | OUTPATIENT
Start: 2023-06-01 | End: 2023-06-01

## 2023-06-01 RX ORDER — DIPHENHYDRAMINE HCL 25 MG
25 CAPSULE ORAL ONCE
Status: COMPLETED | OUTPATIENT
Start: 2023-06-01 | End: 2023-06-01

## 2023-06-01 RX ADMIN — SODIUM CHLORIDE 250 ML: 9 INJECTION, SOLUTION INTRAVENOUS at 13:10

## 2023-06-01 RX ADMIN — FAMOTIDINE 20 MG: 10 INJECTION INTRAVENOUS at 12:50

## 2023-06-01 RX ADMIN — FERRIC CARBOXYMALTOSE INJECTION 750 MG: 50 INJECTION, SOLUTION INTRAVENOUS at 13:12

## 2023-06-01 RX ADMIN — DIPHENHYDRAMINE HYDROCHLORIDE 25 MG: 25 CAPSULE ORAL at 12:52

## 2023-07-24 ENCOUNTER — HOSPITAL ENCOUNTER (OUTPATIENT)
Dept: BONE DENSITY | Facility: HOSPITAL | Age: 68
Discharge: HOME | End: 2023-07-24
Admitting: PHYSICIAN ASSISTANT
Payer: MEDICARE

## 2023-07-24 PROCEDURE — 77080 DXA BONE DENSITY AXIAL: CPT

## 2023-09-21 ENCOUNTER — OFFICE VISIT (OUTPATIENT)
Dept: SLEEP MEDICINE | Facility: CLINIC | Age: 68
End: 2023-09-21
Payer: MEDICARE

## 2023-09-21 VITALS
HEART RATE: 89 BPM | SYSTOLIC BLOOD PRESSURE: 112 MMHG | DIASTOLIC BLOOD PRESSURE: 82 MMHG | OXYGEN SATURATION: 100 % | HEIGHT: 64 IN | TEMPERATURE: 97.5 F | WEIGHT: 208.5 LBS | BODY MASS INDEX: 35.59 KG/M2

## 2023-09-21 DIAGNOSIS — G47.33 OBSTRUCTIVE SLEEP APNEA, ADULT: Primary | ICD-10-CM

## 2023-09-21 PROCEDURE — 99213 OFFICE O/P EST LOW 20 MIN: CPT | Performed by: NURSE PRACTITIONER

## 2023-09-21 PROCEDURE — 3079F DIAST BP 80-89 MM HG: CPT | Performed by: NURSE PRACTITIONER

## 2023-09-21 PROCEDURE — 3074F SYST BP LT 130 MM HG: CPT | Performed by: NURSE PRACTITIONER

## 2023-09-21 NOTE — PROGRESS NOTES
"Chief Complaint:   Chief Complaint   Patient presents with    Follow-up       HPI: Patient presents today for follow-up regarding CPAP therapy.  Patient was last seen 9/22/2021.  Patient continues to do well with CPAP therapy.  Patient is sleeping 7 to 8 hours nightly and does feel rested upon awakening.  Patient goes to sleep easily and does not get up during the night.  Patient has no concerns or complaints regarding CPAP use and wishes to continue therapy.    Jeane Martines is a 67 y.o. female here for follow-up of sleep apnea.   Current Outpatient Medications:     ALPRAZolam (Xanax) 0.25 MG tablet, Take 1 tablet by mouth At Night As Needed for Anxiety or Sleep., Disp: 30 tablet, Rfl: 5    citalopram (CeleXA) 20 MG tablet, TAKE ONE TABLET BY MOUTH DAILY, Disp: 90 tablet, Rfl: 3    cyanocobalamin 1000 MCG/ML injection, ADMINISTER 1 MILLILITER INTRAMUSCULARLY ONCE MONTHLY FOR PERNICIOUS ANEMIA, Disp: 1 mL, Rfl: 11    fluticasone (FLONASE) 50 MCG/ACT nasal spray, 2 sprays into the nostril(s) as directed by provider Daily., Disp: , Rfl:     levothyroxine (Synthroid) 100 MCG tablet, Take 1 tablet by mouth Every Morning., Disp: 90 tablet, Rfl: 3    loratadine (Claritin) 10 MG tablet, Take 1 tablet by mouth Daily. Take 1 tablet every day ., Disp: 90 tablet, Rfl: 3    metoprolol succinate XL (TOPROL-XL) 50 MG 24 hr tablet, Take 1 tablet by mouth Daily., Disp: 90 tablet, Rfl: 3    Syringe/Needle, Disp, (B-D SYRINGE/NEEDLE 1CC/25GX5/8) 25G X 5/8\" 1 ML misc, 1 mL Every 30 (Thirty) Days., Disp: 3 each, Rfl: 4    Timolol Maleate, Once-Daily, 0.5 % solution, , Disp: , Rfl:     UltiCare Tuberculin Safety Syr 25G X 5/8\" 1 ML misc, , Disp: , Rfl: .      The patient's relevant past medical, surgical, family and social history were reviewed and updated in Epic as appropriate.       Review of Systems   Eyes:  Positive for visual disturbance.   Respiratory:  Positive for apnea.    Allergic/Immunologic: Positive for environmental " allergies.   Psychiatric/Behavioral:  Positive for sleep disturbance. The patient is nervous/anxious.    All other systems reviewed and are negative.      Objective:    Physical Exam  Constitutional:       Appearance: Normal appearance.   HENT:      Head: Normocephalic and atraumatic.      Mouth/Throat:      Comments: Class 3 airway  Cardiovascular:      Rate and Rhythm: Normal rate and regular rhythm.   Pulmonary:      Effort: Pulmonary effort is normal.      Breath sounds: Normal breath sounds.   Skin:     General: Skin is warm and dry.   Neurological:      Mental Status: She is alert and oriented to person, place, and time.   Psychiatric:         Mood and Affect: Mood normal.         Behavior: Behavior normal.         Thought Content: Thought content normal.         Judgment: Judgment normal.     CPAP Report    Patient is taking her SIM card to [facility] for download  The patient continues to use and benefit from CPAP therapy.    ASSESSMENT/PLAN    Diagnoses and all orders for this visit:    1. Obstructive sleep apnea, adult (Primary)  -     PAP Therapy        Counseled patient regarding multimodal approach with healthy nutrition, healthy sleep, regular physical activity, social activities, counseling, and medications. Encouraged to practice lateral sleep position. Avoid alcohol and sedatives close to bedtime.    Fill supplies x1 year.  Return to clinic 1 year sooner symptoms warrant.    I have reviewed the results of my evaluation and impression and discussed my recommendations in detail with the patient.      Signed by  Henrietta Miller, OKSANA    September 29, 2023      CC: Vijaya Figueroa PA-C         No ref. provider found

## 2023-11-16 DIAGNOSIS — D50.9 IRON DEFICIENCY ANEMIA, UNSPECIFIED IRON DEFICIENCY ANEMIA TYPE: Primary | ICD-10-CM

## 2023-11-16 DIAGNOSIS — E53.8 B12 DEFICIENCY: ICD-10-CM

## 2023-11-20 ENCOUNTER — CLINICAL SUPPORT (OUTPATIENT)
Dept: FAMILY MEDICINE CLINIC | Facility: CLINIC | Age: 68
End: 2023-11-20
Payer: MEDICARE

## 2023-11-20 ENCOUNTER — OFFICE VISIT (OUTPATIENT)
Dept: ONCOLOGY | Facility: CLINIC | Age: 68
End: 2023-11-20
Payer: MEDICARE

## 2023-11-20 ENCOUNTER — TELEPHONE (OUTPATIENT)
Dept: ONCOLOGY | Facility: CLINIC | Age: 68
End: 2023-11-20
Payer: MEDICARE

## 2023-11-20 ENCOUNTER — LAB (OUTPATIENT)
Dept: LAB | Facility: HOSPITAL | Age: 68
End: 2023-11-20
Payer: MEDICARE

## 2023-11-20 VITALS
HEIGHT: 64 IN | BODY MASS INDEX: 36.02 KG/M2 | HEART RATE: 60 BPM | SYSTOLIC BLOOD PRESSURE: 156 MMHG | OXYGEN SATURATION: 99 % | RESPIRATION RATE: 20 BRPM | DIASTOLIC BLOOD PRESSURE: 75 MMHG | TEMPERATURE: 97.1 F | WEIGHT: 211 LBS

## 2023-11-20 DIAGNOSIS — K90.9 IRON MALABSORPTION: Primary | ICD-10-CM

## 2023-11-20 DIAGNOSIS — E53.8 B12 DEFICIENCY: ICD-10-CM

## 2023-11-20 DIAGNOSIS — Z23 IMMUNIZATION DUE: Primary | ICD-10-CM

## 2023-11-20 DIAGNOSIS — D50.9 IRON DEFICIENCY ANEMIA, UNSPECIFIED IRON DEFICIENCY ANEMIA TYPE: ICD-10-CM

## 2023-11-20 DIAGNOSIS — E61.1 IRON DEFICIENCY: Primary | ICD-10-CM

## 2023-11-20 DIAGNOSIS — K90.9 IRON MALABSORPTION: ICD-10-CM

## 2023-11-20 LAB
BASOPHILS # BLD AUTO: 0.04 10*3/MM3 (ref 0–0.2)
BASOPHILS NFR BLD AUTO: 0.6 % (ref 0–1.5)
DEPRECATED RDW RBC AUTO: 42.8 FL (ref 37–54)
EOSINOPHIL # BLD AUTO: 0.24 10*3/MM3 (ref 0–0.4)
EOSINOPHIL NFR BLD AUTO: 3.4 % (ref 0.3–6.2)
ERYTHROCYTE [DISTWIDTH] IN BLOOD BY AUTOMATED COUNT: 14.6 % (ref 12.3–15.4)
FERRITIN SERPL-MCNC: 10.11 NG/ML (ref 13–150)
HCT VFR BLD AUTO: 34.5 % (ref 34–46.6)
HGB BLD-MCNC: 10.7 G/DL (ref 12–15.9)
IMM GRANULOCYTES # BLD AUTO: 0.03 10*3/MM3 (ref 0–0.05)
IMM GRANULOCYTES NFR BLD AUTO: 0.4 % (ref 0–0.5)
IRON 24H UR-MRATE: 17 MCG/DL (ref 37–145)
IRON SATN MFR SERPL: 4 % (ref 20–50)
LYMPHOCYTES # BLD AUTO: 1.85 10*3/MM3 (ref 0.7–3.1)
LYMPHOCYTES NFR BLD AUTO: 26.4 % (ref 19.6–45.3)
MCH RBC QN AUTO: 24.7 PG (ref 26.6–33)
MCHC RBC AUTO-ENTMCNC: 31 G/DL (ref 31.5–35.7)
MCV RBC AUTO: 79.7 FL (ref 79–97)
MONOCYTES # BLD AUTO: 0.69 10*3/MM3 (ref 0.1–0.9)
MONOCYTES NFR BLD AUTO: 9.9 % (ref 5–12)
NEUTROPHILS NFR BLD AUTO: 4.15 10*3/MM3 (ref 1.7–7)
NEUTROPHILS NFR BLD AUTO: 59.3 % (ref 42.7–76)
PLATELET # BLD AUTO: 310 10*3/MM3 (ref 140–450)
PMV BLD AUTO: 10.1 FL (ref 6–12)
RBC # BLD AUTO: 4.33 10*6/MM3 (ref 3.77–5.28)
TIBC SERPL-MCNC: 478 MCG/DL (ref 298–536)
TRANSFERRIN SERPL-MCNC: 321 MG/DL (ref 200–360)
VIT B12 BLD-MCNC: 229 PG/ML (ref 211–946)
WBC NRBC COR # BLD AUTO: 7 10*3/MM3 (ref 3.4–10.8)

## 2023-11-20 PROCEDURE — 3077F SYST BP >= 140 MM HG: CPT | Performed by: NURSE PRACTITIONER

## 2023-11-20 PROCEDURE — 1159F MED LIST DOCD IN RCRD: CPT | Performed by: NURSE PRACTITIONER

## 2023-11-20 PROCEDURE — 82607 VITAMIN B-12: CPT

## 2023-11-20 PROCEDURE — 84466 ASSAY OF TRANSFERRIN: CPT

## 2023-11-20 PROCEDURE — 85025 COMPLETE CBC W/AUTO DIFF WBC: CPT

## 2023-11-20 PROCEDURE — 82728 ASSAY OF FERRITIN: CPT

## 2023-11-20 PROCEDURE — 1126F AMNT PAIN NOTED NONE PRSNT: CPT | Performed by: NURSE PRACTITIONER

## 2023-11-20 PROCEDURE — 99214 OFFICE O/P EST MOD 30 MIN: CPT | Performed by: NURSE PRACTITIONER

## 2023-11-20 PROCEDURE — 1160F RVW MEDS BY RX/DR IN RCRD: CPT | Performed by: NURSE PRACTITIONER

## 2023-11-20 PROCEDURE — 3078F DIAST BP <80 MM HG: CPT | Performed by: NURSE PRACTITIONER

## 2023-11-20 PROCEDURE — 36415 COLL VENOUS BLD VENIPUNCTURE: CPT

## 2023-11-20 PROCEDURE — 83540 ASSAY OF IRON: CPT

## 2023-11-20 RX ORDER — DIPHENHYDRAMINE HCL 25 MG
25 CAPSULE ORAL ONCE
OUTPATIENT
Start: 2023-11-30

## 2023-11-20 RX ORDER — SODIUM CHLORIDE 9 MG/ML
250 INJECTION, SOLUTION INTRAVENOUS ONCE
OUTPATIENT
Start: 2023-11-30

## 2023-11-20 RX ORDER — SODIUM CHLORIDE 9 MG/ML
250 INJECTION, SOLUTION INTRAVENOUS ONCE
OUTPATIENT
Start: 2023-12-07

## 2023-11-20 RX ORDER — FAMOTIDINE 10 MG/ML
20 INJECTION, SOLUTION INTRAVENOUS ONCE
OUTPATIENT
Start: 2023-11-30

## 2023-11-20 RX ORDER — DIPHENHYDRAMINE HCL 25 MG
25 CAPSULE ORAL ONCE
OUTPATIENT
Start: 2023-12-07

## 2023-11-20 RX ORDER — FAMOTIDINE 10 MG/ML
20 INJECTION, SOLUTION INTRAVENOUS ONCE
OUTPATIENT
Start: 2023-12-07

## 2023-11-20 NOTE — PROGRESS NOTES
DATE OF VISIT: 11/20/2023    REASON FOR VISIT: Followup for microcytic anemia     PROBLEM LIST:  1.  Anemia  2.  Microcytosis:  A.  Negative EGD and colonoscopy done by Dr. Andrade April 12, 2022  3.  Hypothyroid  4.  Hypertension  5.  Depression    HISTORY OF PRESENT ILLNESS: The patient is a very pleasant 68 y.o. female  with past medical history significant for microcytic anemia with iron deficiency diagnosed May 2021. She presented with fatigue, shortness of breath, and blurry vision. Her hemoglobin was found to be 9.2 with MCV 72. The patient received 6 doses of IV iron replacement using Ferrlecit. The patient is here today for scheduled follow up visit with labs.     SUBJECTIVE: The patient is here today by herself. She has been doing fairly well since her last visit. She has noticed some increased fatigue over the last couple months. She tried taking some oral iron pills to help with her fatigue, however she had GI upset with nausea and constipation. She denies changes in stool color or evidence of bleeding. She has been compliant with vitamin B12 replacement monthly.     Past History:  Medical History: has a past medical history of Allergic (3 yrs), Anemia, Depression, Hypertension, Thyroid disease, and Visual impairment.   Surgical History: has a past surgical history that includes Oophorectomy; Hysterectomy; Tubal ligation; Colonoscopy; and [place] tooth extraction.   Family History: family history includes Breast cancer in her paternal aunt; Breast cancer (age of onset: 78) in her sister; Diabetes in her maternal grandmother; Heart attack in her brother and mother; Heart disease in an other family member; Hypertension in her father, mother, and sister; Obesity in an other family member; Stroke in an other family member; Thyroid disease in her father; Vision loss in her father.   Social History: reports that she has never smoked. She has never used smokeless tobacco. She reports that she does not drink  "alcohol and does not use drugs.    (Not in a hospital admission)     Allergies: Patient has no known allergies.    Review of Systems   Constitutional:  Positive for fatigue.       PHYSICAL EXAMINATION:   /75 Comment: LUE  Pulse 60   Temp 97.1 °F (36.2 °C) (Infrared)   Resp 20   Ht 162.6 cm (64\")   Wt 95.7 kg (211 lb)   SpO2 99% Comment: RA  BMI 36.22 kg/m²    Pain Score    11/20/23 1140   PainSc: 0-No pain        ECOG score: 0        ECOG Performance Status: 0 - Asymptomatic  General Appearance:  alert, cooperative, no apparent distress and appears stated age   Lungs:   Clear to auscultation bilaterally; respirations regular, even, and unlabored bilaterally   Heart:  Regular rate and rhythm, no murmurs appreciated   Abdomen:   Soft, non-tender, non-distended, and no organomegaly     Lab on 11/20/2023   Component Date Value Ref Range Status    WBC 11/20/2023 7.00  3.40 - 10.80 10*3/mm3 Final    RBC 11/20/2023 4.33  3.77 - 5.28 10*6/mm3 Final    Hemoglobin 11/20/2023 10.7 (L)  12.0 - 15.9 g/dL Final    Hematocrit 11/20/2023 34.5  34.0 - 46.6 % Final    MCV 11/20/2023 79.7  79.0 - 97.0 fL Final    MCH 11/20/2023 24.7 (L)  26.6 - 33.0 pg Final    MCHC 11/20/2023 31.0 (L)  31.5 - 35.7 g/dL Final    RDW 11/20/2023 14.6  12.3 - 15.4 % Final    RDW-SD 11/20/2023 42.8  37.0 - 54.0 fl Final    MPV 11/20/2023 10.1  6.0 - 12.0 fL Final    Platelets 11/20/2023 310  140 - 450 10*3/mm3 Final    Neutrophil % 11/20/2023 59.3  42.7 - 76.0 % Final    Lymphocyte % 11/20/2023 26.4  19.6 - 45.3 % Final    Monocyte % 11/20/2023 9.9  5.0 - 12.0 % Final    Eosinophil % 11/20/2023 3.4  0.3 - 6.2 % Final    Basophil % 11/20/2023 0.6  0.0 - 1.5 % Final    Immature Grans % 11/20/2023 0.4  0.0 - 0.5 % Final    Neutrophils, Absolute 11/20/2023 4.15  1.70 - 7.00 10*3/mm3 Final    Lymphocytes, Absolute 11/20/2023 1.85  0.70 - 3.10 10*3/mm3 Final    Monocytes, Absolute 11/20/2023 0.69  0.10 - 0.90 10*3/mm3 Final    Eosinophils, Absolute " 11/20/2023 0.24  0.00 - 0.40 10*3/mm3 Final    Basophils, Absolute 11/20/2023 0.04  0.00 - 0.20 10*3/mm3 Final    Immature Grans, Absolute 11/20/2023 0.03  0.00 - 0.05 10*3/mm3 Final        No results found.    ASSESSMENT: The patient is a very pleasant 68 y.o. female  with microcytic anemia.        PLAN:  1.  Anemia:  A.  I reviewed the lab results from today with the patient. Her hemoglobin is down to 10.7 with MCV of 79. Her platelet count and WBC are normal.    B. We will follow up on her vitamin B12 and iron levels from today and notify her of results.   C. She will continue monthly vitamin B12 replacement. This is prescribed by her PCP, FLOR Roldan.     2.  Iron deficiency:  A.  We will follow up on the iron study results and notify her of findings.   B.  We will arrange for repeat IV iron replacement if she has evidence of deficiency with ferritin less than 30 or saturation less than 10%.    C. She has completed her GI work up with capsule endoscopy done by Dr. Andrade. She will need repeat colonoscopy in 10 years.      3.  Hypothyroidism:  A.  She will continue levothyroxine 100 mcg daily for hypothyroidism.     4.  Depression:  A.  She will continue citalopram 20 mg daily for depression.     FOLLOW UP: 6 months with CBC vitamin B12 and iron profile.    Yuliana Wood, APRN  11/20/2023

## 2023-12-01 ENCOUNTER — HOSPITAL ENCOUNTER (OUTPATIENT)
Dept: ONCOLOGY | Facility: HOSPITAL | Age: 68
Discharge: HOME | End: 2023-12-01
Payer: MEDICARE

## 2023-12-01 VITALS
BODY MASS INDEX: 36.19 KG/M2 | TEMPERATURE: 97.5 F | HEIGHT: 64 IN | HEART RATE: 59 BPM | WEIGHT: 212 LBS | SYSTOLIC BLOOD PRESSURE: 151 MMHG | RESPIRATION RATE: 16 BRPM | DIASTOLIC BLOOD PRESSURE: 52 MMHG

## 2023-12-01 DIAGNOSIS — K90.9 IRON MALABSORPTION: ICD-10-CM

## 2023-12-01 DIAGNOSIS — D50.9 IRON DEFICIENCY ANEMIA, UNSPECIFIED IRON DEFICIENCY ANEMIA TYPE: Primary | ICD-10-CM

## 2023-12-01 PROCEDURE — 96374 THER/PROPH/DIAG INJ IV PUSH: CPT

## 2023-12-01 PROCEDURE — 63710000001 DIPHENHYDRAMINE PER 50 MG: Performed by: INTERNAL MEDICINE

## 2023-12-01 PROCEDURE — 25810000003 SODIUM CHLORIDE 0.9 % SOLUTION: Performed by: INTERNAL MEDICINE

## 2023-12-01 PROCEDURE — 96365 THER/PROPH/DIAG IV INF INIT: CPT

## 2023-12-01 PROCEDURE — 25010000002 FERRIC CARBOXYMALTOSE 750 MG/15ML SOLUTION 15 ML VIAL: Performed by: INTERNAL MEDICINE

## 2023-12-01 PROCEDURE — 96375 TX/PRO/DX INJ NEW DRUG ADDON: CPT

## 2023-12-01 RX ORDER — SODIUM CHLORIDE 9 MG/ML
250 INJECTION, SOLUTION INTRAVENOUS ONCE
Status: COMPLETED | OUTPATIENT
Start: 2023-12-01 | End: 2023-12-01

## 2023-12-01 RX ORDER — DIPHENHYDRAMINE HCL 25 MG
25 CAPSULE ORAL ONCE
Status: COMPLETED | OUTPATIENT
Start: 2023-12-01 | End: 2023-12-01

## 2023-12-01 RX ORDER — FAMOTIDINE 10 MG/ML
20 INJECTION, SOLUTION INTRAVENOUS ONCE
Status: COMPLETED | OUTPATIENT
Start: 2023-12-01 | End: 2023-12-01

## 2023-12-01 RX ADMIN — FAMOTIDINE 20 MG: 10 INJECTION, SOLUTION INTRAVENOUS at 13:05

## 2023-12-01 RX ADMIN — FERRIC CARBOXYMALTOSE INJECTION 750 MG: 50 INJECTION, SOLUTION INTRAVENOUS at 13:25

## 2023-12-01 RX ADMIN — DIPHENHYDRAMINE HYDROCHLORIDE 25 MG: 25 CAPSULE ORAL at 12:59

## 2023-12-01 RX ADMIN — SODIUM CHLORIDE 250 ML: 9 INJECTION, SOLUTION INTRAVENOUS at 13:05

## 2023-12-15 ENCOUNTER — HOSPITAL ENCOUNTER (OUTPATIENT)
Dept: ONCOLOGY | Facility: HOSPITAL | Age: 68
Discharge: HOME | End: 2023-12-15
Admitting: INTERNAL MEDICINE
Payer: MEDICARE

## 2023-12-15 VITALS
HEART RATE: 69 BPM | TEMPERATURE: 98.3 F | BODY MASS INDEX: 36.7 KG/M2 | DIASTOLIC BLOOD PRESSURE: 75 MMHG | HEIGHT: 64 IN | RESPIRATION RATE: 16 BRPM | SYSTOLIC BLOOD PRESSURE: 121 MMHG | WEIGHT: 215 LBS

## 2023-12-15 DIAGNOSIS — K90.9 IRON MALABSORPTION: ICD-10-CM

## 2023-12-15 DIAGNOSIS — D50.9 IRON DEFICIENCY ANEMIA, UNSPECIFIED IRON DEFICIENCY ANEMIA TYPE: Primary | ICD-10-CM

## 2023-12-15 PROCEDURE — 63710000001 DIPHENHYDRAMINE PER 50 MG: Performed by: INTERNAL MEDICINE

## 2023-12-15 PROCEDURE — 96374 THER/PROPH/DIAG INJ IV PUSH: CPT

## 2023-12-15 PROCEDURE — 96375 TX/PRO/DX INJ NEW DRUG ADDON: CPT

## 2023-12-15 PROCEDURE — 25010000002 FERRIC CARBOXYMALTOSE 750 MG/15ML SOLUTION 15 ML VIAL: Performed by: INTERNAL MEDICINE

## 2023-12-15 PROCEDURE — 25810000003 SODIUM CHLORIDE 0.9 % SOLUTION: Performed by: INTERNAL MEDICINE

## 2023-12-15 PROCEDURE — 96365 THER/PROPH/DIAG IV INF INIT: CPT

## 2023-12-15 RX ORDER — FAMOTIDINE 10 MG/ML
20 INJECTION, SOLUTION INTRAVENOUS ONCE
Status: COMPLETED | OUTPATIENT
Start: 2023-12-15 | End: 2023-12-15

## 2023-12-15 RX ORDER — SODIUM CHLORIDE 9 MG/ML
250 INJECTION, SOLUTION INTRAVENOUS ONCE
Status: COMPLETED | OUTPATIENT
Start: 2023-12-15 | End: 2023-12-15

## 2023-12-15 RX ORDER — DIPHENHYDRAMINE HCL 25 MG
25 CAPSULE ORAL ONCE
Status: COMPLETED | OUTPATIENT
Start: 2023-12-15 | End: 2023-12-15

## 2023-12-15 RX ADMIN — DIPHENHYDRAMINE HYDROCHLORIDE 25 MG: 25 CAPSULE ORAL at 13:28

## 2023-12-15 RX ADMIN — SODIUM CHLORIDE 250 ML: 9 INJECTION, SOLUTION INTRAVENOUS at 13:20

## 2023-12-15 RX ADMIN — FAMOTIDINE 20 MG: 10 INJECTION, SOLUTION INTRAVENOUS at 13:29

## 2023-12-15 RX ADMIN — FERRIC CARBOXYMALTOSE INJECTION 750 MG: 50 INJECTION, SOLUTION INTRAVENOUS at 13:45

## 2024-01-26 DIAGNOSIS — F41.9 ANXIETY: ICD-10-CM

## 2024-01-26 RX ORDER — ALPRAZOLAM 0.25 MG/1
0.25 TABLET ORAL NIGHTLY PRN
Qty: 30 TABLET | Refills: 0 | OUTPATIENT
Start: 2024-01-26

## 2024-02-20 DIAGNOSIS — F41.9 ANXIETY: ICD-10-CM

## 2024-02-20 RX ORDER — ALPRAZOLAM 0.25 MG/1
TABLET ORAL
Qty: 30 TABLET | OUTPATIENT
Start: 2024-02-20

## 2024-03-08 ENCOUNTER — OFFICE VISIT (OUTPATIENT)
Dept: FAMILY MEDICINE CLINIC | Facility: CLINIC | Age: 69
End: 2024-03-08
Payer: MEDICARE

## 2024-03-08 VITALS
TEMPERATURE: 97.9 F | OXYGEN SATURATION: 99 % | BODY MASS INDEX: 36.26 KG/M2 | DIASTOLIC BLOOD PRESSURE: 72 MMHG | HEART RATE: 67 BPM | SYSTOLIC BLOOD PRESSURE: 124 MMHG | HEIGHT: 64 IN | WEIGHT: 212.4 LBS

## 2024-03-08 DIAGNOSIS — E03.9 HYPOTHYROIDISM (ACQUIRED): Primary | ICD-10-CM

## 2024-03-08 DIAGNOSIS — E53.8 B12 DEFICIENCY: ICD-10-CM

## 2024-03-08 DIAGNOSIS — F41.9 ANXIETY: ICD-10-CM

## 2024-03-08 DIAGNOSIS — I10 ESSENTIAL HYPERTENSION: ICD-10-CM

## 2024-03-08 DIAGNOSIS — F41.1 GAD (GENERALIZED ANXIETY DISORDER): ICD-10-CM

## 2024-03-08 DIAGNOSIS — J30.9 ALLERGIC RHINITIS, UNSPECIFIED SEASONALITY, UNSPECIFIED TRIGGER: ICD-10-CM

## 2024-03-08 DIAGNOSIS — Z51.81 ENCOUNTER FOR THERAPEUTIC DRUG MONITORING: Primary | ICD-10-CM

## 2024-03-08 PROBLEM — H34.8122 CENTRAL RETINAL VEIN OCCLUSION OF LEFT EYE: Status: ACTIVE | Noted: 2024-03-08

## 2024-03-08 PROCEDURE — 1160F RVW MEDS BY RX/DR IN RCRD: CPT | Performed by: PHYSICIAN ASSISTANT

## 2024-03-08 PROCEDURE — 1159F MED LIST DOCD IN RCRD: CPT | Performed by: PHYSICIAN ASSISTANT

## 2024-03-08 PROCEDURE — 99214 OFFICE O/P EST MOD 30 MIN: CPT | Performed by: PHYSICIAN ASSISTANT

## 2024-03-08 PROCEDURE — 3078F DIAST BP <80 MM HG: CPT | Performed by: PHYSICIAN ASSISTANT

## 2024-03-08 PROCEDURE — 3074F SYST BP LT 130 MM HG: CPT | Performed by: PHYSICIAN ASSISTANT

## 2024-03-08 RX ORDER — ALPRAZOLAM 0.25 MG/1
0.25 TABLET ORAL NIGHTLY PRN
Qty: 30 TABLET | Refills: 5 | Status: SHIPPED | OUTPATIENT
Start: 2024-03-08

## 2024-03-08 RX ORDER — METOPROLOL SUCCINATE 50 MG/1
50 TABLET, EXTENDED RELEASE ORAL DAILY
Qty: 90 TABLET | Refills: 1 | Status: SHIPPED | OUTPATIENT
Start: 2024-03-08

## 2024-03-08 RX ORDER — CYANOCOBALAMIN 1000 UG/ML
INJECTION, SOLUTION INTRAMUSCULAR; SUBCUTANEOUS
Qty: 1 ML | Refills: 11 | Status: SHIPPED | OUTPATIENT
Start: 2024-03-08

## 2024-03-08 RX ORDER — NEEDLES, SAFETY 22GX1 1/2"
1 NEEDLE, DISPOSABLE MISCELLANEOUS
Qty: 3 EACH | Refills: 4 | Status: SHIPPED | OUTPATIENT
Start: 2024-03-08

## 2024-03-08 RX ORDER — LEVOTHYROXINE SODIUM 0.1 MG/1
100 TABLET ORAL
Qty: 90 TABLET | Refills: 3 | Status: SHIPPED | OUTPATIENT
Start: 2024-03-08

## 2024-03-08 RX ORDER — CITALOPRAM 20 MG/1
20 TABLET ORAL DAILY
Qty: 90 TABLET | Refills: 3 | Status: SHIPPED | OUTPATIENT
Start: 2024-03-08

## 2024-03-08 RX ORDER — LORATADINE 10 MG/1
10 TABLET ORAL DAILY
Qty: 90 TABLET | Refills: 3 | Status: SHIPPED | OUTPATIENT
Start: 2024-03-08

## 2024-03-08 NOTE — PROGRESS NOTES
Subjective   Jeane Martines is a 68 y.o. female  Anxiety (Refill on alprazolam and Celexa), Hypertension (Refill on metoprolol), and Hypothyroidism (Refill on levothyroxine, possible lab check )      History of Present Illness  The patient is a 68-year-old female who presents for follow-up on high blood pressure, hypothyroidism, and anxiety.    Her energy is good. She denies any breathing problems or dyspnea.    She has been out of alprazolam. She replaced alprazolam with melatonin. She feels a little anxious occasionally. She is nervous while driving and riding with her , but it has been worse since she has not taken it. She takes melatonin gummies. She is taking citalopram.    She has not had any thyroid surgery, thyroid nodules, or biopsies. She takes levothyroxine.    She takes Flonase over the counter. She takes loratadine all the time.    She has central retinal vein occlusion with swelling in the left eye. She sees her ophthalmologist every 6 weeks and has an appointment next Friday, 03/15/2024. She gets an injection in her eyes, which helps. She has blurry vision and gets spots in her eyes. She is getting infusions every 6 months, which have been helpful.    She has an appointment with a dermatologist on 04/2024 for a mole.    The following portions of the patient's history were reviewed and updated as appropriate: allergies, current medications, past social history and problem list    Review of Systems   Constitutional:  Negative for fatigue and unexpected weight change.   Eyes:  Negative for visual disturbance.   Respiratory:  Negative for cough, chest tightness and shortness of breath.    Cardiovascular:  Negative for chest pain, palpitations and leg swelling.   Gastrointestinal:  Negative for constipation, diarrhea and nausea.   Endocrine: Negative for cold intolerance and heat intolerance.   Skin:  Negative for color change and rash.   Neurological:  Negative for dizziness, tremors, syncope,  weakness and headaches.   Psychiatric/Behavioral:  Negative for agitation. The patient is not nervous/anxious.         Stable on medication       Objective     Vitals:    03/08/24 1340   BP: 124/72   Pulse: 67   Temp: 97.9 °F (36.6 °C)   SpO2: 99%       Physical Exam  Vitals and nursing note reviewed.   Constitutional:       General: She is not in acute distress.     Appearance: Normal appearance. She is well-developed. She is not ill-appearing, toxic-appearing or diaphoretic.      Comments: BMI36   HENT:      Head: Normocephalic and atraumatic.   Eyes:      Comments: No exopthalmos noted   Neck:      Thyroid: No thyroid mass, thyromegaly or thyroid tenderness.      Vascular: No carotid bruit or JVD.   Cardiovascular:      Rate and Rhythm: Normal rate and regular rhythm.      Pulses: Normal pulses.      Heart sounds: Normal heart sounds. No murmur heard.  Pulmonary:      Effort: Pulmonary effort is normal. No respiratory distress.      Breath sounds: Normal breath sounds.   Abdominal:      Palpations: Abdomen is soft.      Tenderness: There is no abdominal tenderness.   Lymphadenopathy:      Cervical: No cervical adenopathy.   Skin:     General: Skin is warm and dry.   Neurological:      Mental Status: She is alert and oriented to person, place, and time.      Coordination: Coordination normal.   Psychiatric:         Mood and Affect: Mood normal.         Behavior: Behavior normal.         Assessment & Plan     Diagnoses and all orders for this visit:    1. Hypothyroidism (acquired) (Primary)  -     TSH; Future  -     T4, Free; Future    2. Essential hypertension    3. Allergic rhinitis, unspecified seasonality, unspecified trigger    4. INO (generalized anxiety disorder)    5. B12 deficiency    Other orders  -     citalopram (CeleXA) 20 MG tablet; Take 1 tablet by mouth Daily.  Dispense: 90 tablet; Refill: 3  -     metoprolol succinate XL (TOPROL-XL) 50 MG 24 hr tablet; Take 1 tablet by mouth Daily.  Dispense: 90  "tablet; Refill: 1  -     levothyroxine (Synthroid) 100 MCG tablet; Take 1 tablet by mouth Every Morning.  Dispense: 90 tablet; Refill: 3  -     loratadine (Claritin) 10 MG tablet; Take 1 tablet by mouth Daily. Take 1 tablet every day .  Dispense: 90 tablet; Refill: 3  -     cyanocobalamin 1000 MCG/ML injection; ADMINISTER 1 MILLILITER INTRAMUSCULARLY ONCE MONTHLY FOR PERNICIOUS ANEMIA  Dispense: 1 mL; Refill: 11  -     Syringe/Needle, Disp, (B-D SYRINGE/NEEDLE 1CC/25GX5/8) 25G X 5/8\" 1 ML misc; Use 1 mL Every 30 (Thirty) Days.  Dispense: 3 each; Refill: 4       1. Anxiety.  I will refill her alprazolam 1 nightly as needed. She can take melatonin with it.    2. Hypertension.  Her blood pressure looks excellent. She will continue metoprolol.    3. Hypothyroidism.  It has been a year since she had her thyroid level checked. She will continue levothyroxine. I will check her thyroid level.    4. Seasonal allergies.  She will continue loratadine.    5. Central retinal vein occlusion with swelling in the left eye.  This is stable. She will ask her eye doctor to send her reports to us.    6. Allergies.  She will continue Flonase. I will refill loratadine.    Follow-up  The patient will follow up in 6 months for her Medicare annual wellness.    As part of this patient's treatment plan, patient will be prescribed controlled substances. The patient has been made aware of appropriate use of such medications, including potential risk of somnolence, limited ability to drive and /or work safely, and potential for dependence or overdose. It has also been made clear that these medications are for use by this patient only, without concomitant use of alcohol or other substances unless prescribed.Controlled substance status of medication discussed with patient, discussed risks of medication including abuse potential and diversion potential and need to follow up for reevaluation appointment in order to receive further refills.    Part " of this note may be an electronic transcription/translation of spoken language to printed text using the Dragon Dictation System.      Electronic prescription will be sent for current dosage of Xanax 0.25 mg 1 nightly as needed for anxiety #30 with 5 refills per Dr. Garcia.  Controlled substance agreement updated today UDS obtained.  Follow-up in 6 months for recheck and for annual wellness visit at that time.  Answers submitted by the patient for this visit:  Other (Submitted on 3/7/2024)  Please describe your symptoms.: None  Have you had these symptoms before?: No  How long have you been having these symptoms?: 1-4 days  Primary Reason for Visit (Submitted on 3/7/2024)  What is the primary reason for your visit?: Other    Transcribed from ambient dictation for Vijaya Figueroa PA-C by Jeff Garrido  03/08/24   15:17 EST    Patient or patient representative verbalized consent to the visit recording.  I have personally performed the services described in this document as transcribed by the above individual, and it is both accurate and complete.

## 2024-05-24 ENCOUNTER — OFFICE VISIT (OUTPATIENT)
Dept: ONCOLOGY | Facility: CLINIC | Age: 69
End: 2024-05-24
Payer: MEDICARE

## 2024-05-24 ENCOUNTER — LAB (OUTPATIENT)
Dept: LAB | Facility: HOSPITAL | Age: 69
End: 2024-05-24
Payer: MEDICARE

## 2024-05-24 ENCOUNTER — TELEPHONE (OUTPATIENT)
Dept: ONCOLOGY | Facility: CLINIC | Age: 69
End: 2024-05-24

## 2024-05-24 VITALS
OXYGEN SATURATION: 96 % | RESPIRATION RATE: 16 BRPM | WEIGHT: 214 LBS | BODY MASS INDEX: 36.54 KG/M2 | HEART RATE: 63 BPM | HEIGHT: 64 IN | DIASTOLIC BLOOD PRESSURE: 56 MMHG | SYSTOLIC BLOOD PRESSURE: 116 MMHG | TEMPERATURE: 96.6 F

## 2024-05-24 DIAGNOSIS — K90.9 IRON MALABSORPTION: Primary | ICD-10-CM

## 2024-05-24 DIAGNOSIS — D50.9 IRON DEFICIENCY ANEMIA, UNSPECIFIED IRON DEFICIENCY ANEMIA TYPE: Primary | ICD-10-CM

## 2024-05-24 DIAGNOSIS — E03.9 HYPOTHYROIDISM, UNSPECIFIED TYPE: Primary | ICD-10-CM

## 2024-05-24 DIAGNOSIS — K90.9 IRON MALABSORPTION: ICD-10-CM

## 2024-05-24 DIAGNOSIS — E53.8 B12 DEFICIENCY: ICD-10-CM

## 2024-05-24 DIAGNOSIS — E61.1 IRON DEFICIENCY: ICD-10-CM

## 2024-05-24 DIAGNOSIS — D50.9 IRON DEFICIENCY ANEMIA, UNSPECIFIED IRON DEFICIENCY ANEMIA TYPE: ICD-10-CM

## 2024-05-24 DIAGNOSIS — E03.9 HYPOTHYROIDISM (ACQUIRED): ICD-10-CM

## 2024-05-24 LAB
BASOPHILS # BLD AUTO: 0.03 10*3/MM3 (ref 0–0.2)
BASOPHILS NFR BLD AUTO: 0.4 % (ref 0–1.5)
DEPRECATED RDW RBC AUTO: 41.6 FL (ref 37–54)
EOSINOPHIL # BLD AUTO: 0.24 10*3/MM3 (ref 0–0.4)
EOSINOPHIL NFR BLD AUTO: 3.3 % (ref 0.3–6.2)
ERYTHROCYTE [DISTWIDTH] IN BLOOD BY AUTOMATED COUNT: 14.4 % (ref 12.3–15.4)
FERRITIN SERPL-MCNC: 13.13 NG/ML (ref 13–150)
HCT VFR BLD AUTO: 33.1 % (ref 34–46.6)
HGB BLD-MCNC: 10.3 G/DL (ref 12–15.9)
IMM GRANULOCYTES # BLD AUTO: 0.03 10*3/MM3 (ref 0–0.05)
IMM GRANULOCYTES NFR BLD AUTO: 0.4 % (ref 0–0.5)
IRON 24H UR-MRATE: 19 MCG/DL (ref 37–145)
IRON SATN MFR SERPL: 4 % (ref 20–50)
LYMPHOCYTES # BLD AUTO: 1.64 10*3/MM3 (ref 0.7–3.1)
LYMPHOCYTES NFR BLD AUTO: 22.7 % (ref 19.6–45.3)
MCH RBC QN AUTO: 24.5 PG (ref 26.6–33)
MCHC RBC AUTO-ENTMCNC: 31.1 G/DL (ref 31.5–35.7)
MCV RBC AUTO: 78.6 FL (ref 79–97)
MONOCYTES # BLD AUTO: 0.7 10*3/MM3 (ref 0.1–0.9)
MONOCYTES NFR BLD AUTO: 9.7 % (ref 5–12)
NEUTROPHILS NFR BLD AUTO: 4.59 10*3/MM3 (ref 1.7–7)
NEUTROPHILS NFR BLD AUTO: 63.5 % (ref 42.7–76)
PLATELET # BLD AUTO: 328 10*3/MM3 (ref 140–450)
PMV BLD AUTO: 10.3 FL (ref 6–12)
RBC # BLD AUTO: 4.21 10*6/MM3 (ref 3.77–5.28)
T4 FREE SERPL-MCNC: 1.52 NG/DL (ref 0.92–1.68)
TIBC SERPL-MCNC: 438 MCG/DL (ref 298–536)
TRANSFERRIN SERPL-MCNC: 294 MG/DL (ref 200–360)
TSH SERPL DL<=0.05 MIU/L-ACNC: 1.16 UIU/ML (ref 0.27–4.2)
VIT B12 BLD-MCNC: 235 PG/ML (ref 211–946)
WBC NRBC COR # BLD AUTO: 7.23 10*3/MM3 (ref 3.4–10.8)

## 2024-05-24 PROCEDURE — 3078F DIAST BP <80 MM HG: CPT | Performed by: INTERNAL MEDICINE

## 2024-05-24 PROCEDURE — 82607 VITAMIN B-12: CPT

## 2024-05-24 PROCEDURE — 1126F AMNT PAIN NOTED NONE PRSNT: CPT | Performed by: INTERNAL MEDICINE

## 2024-05-24 PROCEDURE — 84466 ASSAY OF TRANSFERRIN: CPT

## 2024-05-24 PROCEDURE — 36415 COLL VENOUS BLD VENIPUNCTURE: CPT

## 2024-05-24 PROCEDURE — 84439 ASSAY OF FREE THYROXINE: CPT

## 2024-05-24 PROCEDURE — 84443 ASSAY THYROID STIM HORMONE: CPT

## 2024-05-24 PROCEDURE — 3074F SYST BP LT 130 MM HG: CPT | Performed by: INTERNAL MEDICINE

## 2024-05-24 PROCEDURE — 85025 COMPLETE CBC W/AUTO DIFF WBC: CPT

## 2024-05-24 PROCEDURE — 82728 ASSAY OF FERRITIN: CPT

## 2024-05-24 PROCEDURE — 99214 OFFICE O/P EST MOD 30 MIN: CPT | Performed by: INTERNAL MEDICINE

## 2024-05-24 PROCEDURE — 83540 ASSAY OF IRON: CPT

## 2024-05-24 RX ORDER — SODIUM CHLORIDE 9 MG/ML
250 INJECTION, SOLUTION INTRAVENOUS ONCE
OUTPATIENT
Start: 2024-06-11

## 2024-05-24 RX ORDER — SODIUM CHLORIDE 9 MG/ML
250 INJECTION, SOLUTION INTRAVENOUS ONCE
OUTPATIENT
Start: 2024-06-04

## 2024-05-24 RX ORDER — DIPHENHYDRAMINE HCL 25 MG
25 CAPSULE ORAL ONCE
OUTPATIENT
Start: 2024-06-11

## 2024-05-24 RX ORDER — FAMOTIDINE 10 MG/ML
20 INJECTION, SOLUTION INTRAVENOUS ONCE
OUTPATIENT
Start: 2024-06-04

## 2024-05-24 RX ORDER — DIPHENHYDRAMINE HCL 25 MG
25 CAPSULE ORAL ONCE
OUTPATIENT
Start: 2024-06-04

## 2024-05-24 RX ORDER — FAMOTIDINE 10 MG/ML
20 INJECTION, SOLUTION INTRAVENOUS ONCE
OUTPATIENT
Start: 2024-06-11

## 2024-05-24 NOTE — TELEPHONE ENCOUNTER
RN called patient to let her know that she needs IV iron. We will plan on 2 more doses of injectafer. Patient would like to get these infusions in [place] whenever they open. RN told patient that she would reach out to the infusion  and see if they can reach out to her with dates. Patient v/u and appreciation.

## 2024-05-24 NOTE — PROGRESS NOTES
DATE OF VISIT: 5/24/2024    REASON FOR VISIT: Followup for microcytic anemia     PROBLEM LIST:  1.  Anemia  2.  Microcytosis:  A.  Negative EGD and colonoscopy done by Dr. Andrade April 12, 2022  3.  Hypothyroid  4.  Hypertension  5.  Depression    HISTORY OF PRESENT ILLNESS: The patient is a very pleasant 68 y.o. female  with past medical history significant for microcytic anemia with iron deficiency diagnosed May 2021. She presented with fatigue, shortness of breath, and blurry vision. Her hemoglobin was found to be 9.2 with MCV 72. The patient received 6 doses of IV iron replacement using Ferrlecit. The patient is here today for scheduled follow up visit with labs.     SUBJECTIVE: Jeane is here today by herself.  She is complaining of fatigue.  No active bleeding.    Past History:  Medical History: has a past medical history of Allergic (3 yrs), Anemia, Depression, Hypertension, Thyroid disease, and Visual impairment.   Surgical History: has a past surgical history that includes Oophorectomy; Hysterectomy; Tubal ligation; Colonoscopy; and [place] tooth extraction.   Family History: family history includes Breast cancer in her paternal aunt; Breast cancer (age of onset: 78) in her sister; Diabetes in her maternal grandmother; Heart attack in her brother and mother; Heart disease in an other family member; Hypertension in her father, mother, and sister; Obesity in an other family member; Stroke in an other family member; Thyroid disease in her father; Vision loss in her father.   Social History: reports that she has never smoked. She has never used smokeless tobacco. She reports that she does not drink alcohol and does not use drugs.    (Not in a hospital admission)     Allergies: Patient has no known allergies.    Review of Systems   Constitutional:  Positive for fatigue.       PHYSICAL EXAMINATION:   There were no vitals taken for this visit.   There were no vitals filed for this visit.                ECOG  Performance Status: 1 - Symptomatic but completely ambulatory  General Appearance:  alert, cooperative, no apparent distress and appears stated age   Lungs:   Clear to auscultation bilaterally; respirations regular, even, and unlabored bilaterally   Heart:  Regular rate and rhythm, no murmurs appreciated   Abdomen:   Soft, non-tender, non-distended, and no organomegaly     Lab on 05/24/2024   Component Date Value Ref Range Status    WBC 05/24/2024 7.23  3.40 - 10.80 10*3/mm3 Final    RBC 05/24/2024 4.21  3.77 - 5.28 10*6/mm3 Final    Hemoglobin 05/24/2024 10.3 (L)  12.0 - 15.9 g/dL Final    Hematocrit 05/24/2024 33.1 (L)  34.0 - 46.6 % Final    MCV 05/24/2024 78.6 (L)  79.0 - 97.0 fL Final    MCH 05/24/2024 24.5 (L)  26.6 - 33.0 pg Final    MCHC 05/24/2024 31.1 (L)  31.5 - 35.7 g/dL Final    RDW 05/24/2024 14.4  12.3 - 15.4 % Final    RDW-SD 05/24/2024 41.6  37.0 - 54.0 fl Final    MPV 05/24/2024 10.3  6.0 - 12.0 fL Final    Platelets 05/24/2024 328  140 - 450 10*3/mm3 Final    Neutrophil % 05/24/2024 63.5  42.7 - 76.0 % Final    Lymphocyte % 05/24/2024 22.7  19.6 - 45.3 % Final    Monocyte % 05/24/2024 9.7  5.0 - 12.0 % Final    Eosinophil % 05/24/2024 3.3  0.3 - 6.2 % Final    Basophil % 05/24/2024 0.4  0.0 - 1.5 % Final    Immature Grans % 05/24/2024 0.4  0.0 - 0.5 % Final    Neutrophils, Absolute 05/24/2024 4.59  1.70 - 7.00 10*3/mm3 Final    Lymphocytes, Absolute 05/24/2024 1.64  0.70 - 3.10 10*3/mm3 Final    Monocytes, Absolute 05/24/2024 0.70  0.10 - 0.90 10*3/mm3 Final    Eosinophils, Absolute 05/24/2024 0.24  0.00 - 0.40 10*3/mm3 Final    Basophils, Absolute 05/24/2024 0.03  0.00 - 0.20 10*3/mm3 Final    Immature Grans, Absolute 05/24/2024 0.03  0.00 - 0.05 10*3/mm3 Final        No results found.    ASSESSMENT: The patient is a very pleasant 68 y.o. female  with microcytic anemia.        PLAN:  1.  Anemia:  A.  I did go over the CBC result the patient from today her hemoglobin down to 10.3 with low MCV of  79.  B. We will follow up on her vitamin B12 and iron levels from today and notify her of results.   C. She will continue monthly vitamin B12 replacement. This is prescribed by her PCP, FLOR Roldan.     2.  Iron deficiency:  A.  We will follow up on the iron study results and notify her of findings.   B.  We will arrange for repeat IV iron replacement if she has evidence of deficiency with ferritin less than 30 or saturation less than 10%.    C. She has completed her GI work up with capsule endoscopy done by Dr. Andrade. She will need repeat colonoscopy in 10 years.      3.  Hypothyroidism:  A.  She will continue levothyroxine 100 mcg daily for hypothyroidism.     4.  Depression:  A.  She will continue citalopram 20 mg daily for depression.     FOLLOW UP: 3 months with CBC vitamin B12 and iron profile.    France Aden MD  5/24/2024

## 2024-06-04 ENCOUNTER — HOSPITAL ENCOUNTER (OUTPATIENT)
Facility: HOSPITAL | Age: 69
Discharge: HOME | End: 2024-06-04
Admitting: INTERNAL MEDICINE
Payer: MEDICARE

## 2024-06-04 VITALS
DIASTOLIC BLOOD PRESSURE: 86 MMHG | HEART RATE: 54 BPM | SYSTOLIC BLOOD PRESSURE: 127 MMHG | HEIGHT: 64 IN | WEIGHT: 215.4 LBS | BODY MASS INDEX: 36.77 KG/M2 | TEMPERATURE: 98 F | RESPIRATION RATE: 16 BRPM

## 2024-06-04 DIAGNOSIS — K90.9 IRON MALABSORPTION: ICD-10-CM

## 2024-06-04 DIAGNOSIS — D50.9 IRON DEFICIENCY ANEMIA, UNSPECIFIED IRON DEFICIENCY ANEMIA TYPE: Primary | ICD-10-CM

## 2024-06-04 PROCEDURE — 96374 THER/PROPH/DIAG INJ IV PUSH: CPT

## 2024-06-04 PROCEDURE — 25810000003 SODIUM CHLORIDE 0.9 % SOLUTION: Performed by: INTERNAL MEDICINE

## 2024-06-04 PROCEDURE — 63710000001 DIPHENHYDRAMINE PER 50 MG: Performed by: INTERNAL MEDICINE

## 2024-06-04 PROCEDURE — 25010000002 FERRIC CARBOXYMALTOSE 750 MG/15ML SOLUTION 15 ML VIAL: Performed by: INTERNAL MEDICINE

## 2024-06-04 PROCEDURE — 96375 TX/PRO/DX INJ NEW DRUG ADDON: CPT

## 2024-06-04 RX ORDER — SODIUM CHLORIDE 9 MG/ML
250 INJECTION, SOLUTION INTRAVENOUS ONCE
Status: COMPLETED | OUTPATIENT
Start: 2024-06-04 | End: 2024-06-04

## 2024-06-04 RX ORDER — FAMOTIDINE 10 MG/ML
20 INJECTION, SOLUTION INTRAVENOUS ONCE
Status: COMPLETED | OUTPATIENT
Start: 2024-06-04 | End: 2024-06-04

## 2024-06-04 RX ORDER — DIPHENHYDRAMINE HCL 25 MG
25 CAPSULE ORAL ONCE
Status: COMPLETED | OUTPATIENT
Start: 2024-06-04 | End: 2024-06-04

## 2024-06-04 RX ADMIN — DIPHENHYDRAMINE HYDROCHLORIDE 25 MG: 25 CAPSULE ORAL at 09:57

## 2024-06-04 RX ADMIN — SODIUM CHLORIDE 250 ML: 9 INJECTION, SOLUTION INTRAVENOUS at 09:55

## 2024-06-04 RX ADMIN — FAMOTIDINE 20 MG: 10 INJECTION, SOLUTION INTRAVENOUS at 09:58

## 2024-06-04 RX ADMIN — FERRIC CARBOXYMALTOSE INJECTION 750 MG: 50 INJECTION, SOLUTION INTRAVENOUS at 10:32

## 2024-06-11 ENCOUNTER — HOSPITAL ENCOUNTER (OUTPATIENT)
Facility: HOSPITAL | Age: 69
Discharge: HOME | End: 2024-06-11
Admitting: INTERNAL MEDICINE
Payer: MEDICARE

## 2024-06-11 VITALS
DIASTOLIC BLOOD PRESSURE: 58 MMHG | HEART RATE: 56 BPM | WEIGHT: 213.2 LBS | TEMPERATURE: 98.3 F | BODY MASS INDEX: 36.4 KG/M2 | RESPIRATION RATE: 16 BRPM | SYSTOLIC BLOOD PRESSURE: 138 MMHG | HEIGHT: 64 IN

## 2024-06-11 DIAGNOSIS — D50.9 IRON DEFICIENCY ANEMIA, UNSPECIFIED IRON DEFICIENCY ANEMIA TYPE: Primary | ICD-10-CM

## 2024-06-11 DIAGNOSIS — K90.9 IRON MALABSORPTION: ICD-10-CM

## 2024-06-11 PROCEDURE — 25810000003 SODIUM CHLORIDE 0.9 % SOLUTION: Performed by: INTERNAL MEDICINE

## 2024-06-11 PROCEDURE — 25010000002 FERRIC CARBOXYMALTOSE 750 MG/15ML SOLUTION 15 ML VIAL: Performed by: INTERNAL MEDICINE

## 2024-06-11 PROCEDURE — 96375 TX/PRO/DX INJ NEW DRUG ADDON: CPT

## 2024-06-11 PROCEDURE — 63710000001 DIPHENHYDRAMINE PER 50 MG: Performed by: INTERNAL MEDICINE

## 2024-06-11 PROCEDURE — 96365 THER/PROPH/DIAG IV INF INIT: CPT

## 2024-06-11 RX ORDER — SODIUM CHLORIDE 9 MG/ML
250 INJECTION, SOLUTION INTRAVENOUS ONCE
Status: COMPLETED | OUTPATIENT
Start: 2024-06-11 | End: 2024-06-11

## 2024-06-11 RX ORDER — FAMOTIDINE 10 MG/ML
20 INJECTION, SOLUTION INTRAVENOUS ONCE
Status: COMPLETED | OUTPATIENT
Start: 2024-06-11 | End: 2024-06-11

## 2024-06-11 RX ORDER — DIPHENHYDRAMINE HCL 25 MG
25 CAPSULE ORAL ONCE
Status: COMPLETED | OUTPATIENT
Start: 2024-06-11 | End: 2024-06-11

## 2024-06-11 RX ADMIN — FERRIC CARBOXYMALTOSE INJECTION 750 MG: 50 INJECTION, SOLUTION INTRAVENOUS at 10:37

## 2024-06-11 RX ADMIN — FAMOTIDINE 20 MG: 10 INJECTION, SOLUTION INTRAVENOUS at 10:06

## 2024-06-11 RX ADMIN — DIPHENHYDRAMINE HYDROCHLORIDE 25 MG: 25 CAPSULE ORAL at 10:06

## 2024-06-11 RX ADMIN — SODIUM CHLORIDE 250 ML: 9 INJECTION, SOLUTION INTRAVENOUS at 10:06

## 2024-08-21 ENCOUNTER — LAB (OUTPATIENT)
Dept: LAB | Facility: HOSPITAL | Age: 69
End: 2024-08-21
Payer: MEDICARE

## 2024-08-21 DIAGNOSIS — D50.9 IRON DEFICIENCY ANEMIA, UNSPECIFIED IRON DEFICIENCY ANEMIA TYPE: ICD-10-CM

## 2024-08-21 LAB
BASOPHILS # BLD AUTO: 0.06 10*3/MM3 (ref 0–0.2)
BASOPHILS NFR BLD AUTO: 1 % (ref 0–1.5)
DEPRECATED RDW RBC AUTO: 42.2 FL (ref 37–54)
EOSINOPHIL # BLD AUTO: 0.25 10*3/MM3 (ref 0–0.4)
EOSINOPHIL NFR BLD AUTO: 4.2 % (ref 0.3–6.2)
ERYTHROCYTE [DISTWIDTH] IN BLOOD BY AUTOMATED COUNT: 14 % (ref 12.3–15.4)
FERRITIN SERPL-MCNC: 16.94 NG/ML (ref 13–150)
HCT VFR BLD AUTO: 37 % (ref 34–46.6)
HGB BLD-MCNC: 11.9 G/DL (ref 12–15.9)
IMM GRANULOCYTES # BLD AUTO: 0.02 10*3/MM3 (ref 0–0.05)
IMM GRANULOCYTES NFR BLD AUTO: 0.3 % (ref 0–0.5)
IRON 24H UR-MRATE: 23 MCG/DL (ref 37–145)
IRON SATN MFR SERPL: 6 % (ref 20–50)
LYMPHOCYTES # BLD AUTO: 1.42 10*3/MM3 (ref 0.7–3.1)
LYMPHOCYTES NFR BLD AUTO: 23.9 % (ref 19.6–45.3)
MCH RBC QN AUTO: 27.1 PG (ref 26.6–33)
MCHC RBC AUTO-ENTMCNC: 32.2 G/DL (ref 31.5–35.7)
MCV RBC AUTO: 84.3 FL (ref 79–97)
MONOCYTES # BLD AUTO: 0.5 10*3/MM3 (ref 0.1–0.9)
MONOCYTES NFR BLD AUTO: 8.4 % (ref 5–12)
NEUTROPHILS NFR BLD AUTO: 3.68 10*3/MM3 (ref 1.7–7)
NEUTROPHILS NFR BLD AUTO: 62.2 % (ref 42.7–76)
NRBC BLD AUTO-RTO: 0 /100 WBC (ref 0–0.2)
PLATELET # BLD AUTO: 293 10*3/MM3 (ref 140–450)
PMV BLD AUTO: 10.7 FL (ref 6–12)
RBC # BLD AUTO: 4.39 10*6/MM3 (ref 3.77–5.28)
TIBC SERPL-MCNC: 393 MCG/DL (ref 298–536)
TRANSFERRIN SERPL-MCNC: 264 MG/DL (ref 200–360)
WBC NRBC COR # BLD AUTO: 5.93 10*3/MM3 (ref 3.4–10.8)

## 2024-08-21 PROCEDURE — 36415 COLL VENOUS BLD VENIPUNCTURE: CPT

## 2024-08-21 PROCEDURE — 82607 VITAMIN B-12: CPT

## 2024-08-21 PROCEDURE — 83540 ASSAY OF IRON: CPT

## 2024-08-21 PROCEDURE — 82728 ASSAY OF FERRITIN: CPT

## 2024-08-21 PROCEDURE — 85025 COMPLETE CBC W/AUTO DIFF WBC: CPT

## 2024-08-21 PROCEDURE — 84466 ASSAY OF TRANSFERRIN: CPT

## 2024-08-22 LAB — VIT B12 BLD-MCNC: 313 PG/ML (ref 211–946)

## 2024-08-23 ENCOUNTER — OFFICE VISIT (OUTPATIENT)
Age: 69
End: 2024-08-23
Payer: MEDICARE

## 2024-08-23 VITALS
BODY MASS INDEX: 36.52 KG/M2 | SYSTOLIC BLOOD PRESSURE: 144 MMHG | TEMPERATURE: 98.2 F | DIASTOLIC BLOOD PRESSURE: 84 MMHG | WEIGHT: 213.9 LBS | RESPIRATION RATE: 16 BRPM | HEIGHT: 64 IN | HEART RATE: 61 BPM | OXYGEN SATURATION: 98 %

## 2024-08-23 DIAGNOSIS — K90.9 IRON MALABSORPTION: Primary | ICD-10-CM

## 2024-08-23 DIAGNOSIS — D50.9 IRON DEFICIENCY ANEMIA, UNSPECIFIED IRON DEFICIENCY ANEMIA TYPE: ICD-10-CM

## 2024-08-23 PROCEDURE — 1126F AMNT PAIN NOTED NONE PRSNT: CPT | Performed by: NURSE PRACTITIONER

## 2024-08-23 PROCEDURE — 3077F SYST BP >= 140 MM HG: CPT | Performed by: NURSE PRACTITIONER

## 2024-08-23 PROCEDURE — 1160F RVW MEDS BY RX/DR IN RCRD: CPT | Performed by: NURSE PRACTITIONER

## 2024-08-23 PROCEDURE — 3079F DIAST BP 80-89 MM HG: CPT | Performed by: NURSE PRACTITIONER

## 2024-08-23 PROCEDURE — 1159F MED LIST DOCD IN RCRD: CPT | Performed by: NURSE PRACTITIONER

## 2024-08-23 PROCEDURE — 99214 OFFICE O/P EST MOD 30 MIN: CPT | Performed by: NURSE PRACTITIONER

## 2024-08-23 RX ORDER — DIPHENHYDRAMINE HCL 25 MG
25 CAPSULE ORAL ONCE
OUTPATIENT
Start: 2024-08-30

## 2024-08-23 RX ORDER — SODIUM CHLORIDE 9 MG/ML
250 INJECTION, SOLUTION INTRAVENOUS ONCE
OUTPATIENT
Start: 2024-08-30

## 2024-08-23 RX ORDER — DIPHENHYDRAMINE HCL 25 MG
25 CAPSULE ORAL ONCE
OUTPATIENT
Start: 2024-09-06

## 2024-08-23 RX ORDER — SODIUM CHLORIDE 9 MG/ML
250 INJECTION, SOLUTION INTRAVENOUS ONCE
OUTPATIENT
Start: 2024-09-06

## 2024-08-23 RX ORDER — FAMOTIDINE 10 MG/ML
20 INJECTION, SOLUTION INTRAVENOUS ONCE
OUTPATIENT
Start: 2024-08-30

## 2024-08-23 RX ORDER — FAMOTIDINE 10 MG/ML
20 INJECTION, SOLUTION INTRAVENOUS ONCE
OUTPATIENT
Start: 2024-09-06

## 2024-08-23 NOTE — PROGRESS NOTES
"DATE OF VISIT: 8/23/2024    REASON FOR VISIT: Followup for microcytic anemia     PROBLEM LIST:  1.  Anemia  2.  Microcytosis:  A.  Negative EGD and colonoscopy done by Dr. Andrade April 12, 2022  3.  Hypothyroid  4.  Hypertension  5.  Depression    HISTORY OF PRESENT ILLNESS: The patient is a very pleasant 68 y.o. female with past medical history significant for microcytic anemia with iron deficiency diagnosed May 2021. She presented with fatigue, shortness of breath, and blurry vision. Her hemoglobin was found to be 9.2 with MCV 72. The patient received 6 doses of IV iron replacement using Ferrlecit. The patient is here today for scheduled follow up visit with labs.     SUBJECTIVE:     Past History:  Medical History: has a past medical history of Allergic (3 yrs), Anemia, Depression, Hypertension, Thyroid disease, and Visual impairment.   Surgical History: has a past surgical history that includes Oophorectomy; Hysterectomy; Tubal ligation; Colonoscopy; and Combes tooth extraction.   Family History: family history includes Breast cancer in her paternal aunt; Breast cancer (age of onset: 78) in her sister; Diabetes in her maternal grandmother; Heart attack in her brother and mother; Heart disease in an other family member; Hypertension in her father, mother, and sister; Obesity in an other family member; Stroke in an other family member; Thyroid disease in her father; Vision loss in her father.   Social History: reports that she has never smoked. She has never used smokeless tobacco. She reports that she does not drink alcohol and does not use drugs.    (Not in a hospital admission)     Allergies: Patient has no known allergies.    Review of Systems   Constitutional:  Positive for fatigue.   Endocrine:        Hot flashes       PHYSICAL EXAMINATION:   /84   Pulse 61   Temp 98.2 °F (36.8 °C) (Temporal)   Resp 16   Ht 162.6 cm (64.02\")   Wt 97 kg (213 lb 14.4 oz)   SpO2 98%   BMI 36.70 kg/m²    Pain " Score    08/23/24 1300   PainSc: 0-No pain          ECOG score: 0        ECOG Performance Status: 0 - Asymptomatic  General Appearance:  alert, cooperative, no apparent distress and appears stated age   Lungs:   Clear to auscultation bilaterally; respirations regular, even, and unlabored bilaterally   Heart:  Regular rate and rhythm, no murmurs appreciated   Abdomen:   Soft, non-tender, non-distended, and no organomegaly     Lab on 08/21/2024   Component Date Value Ref Range Status    Iron 08/21/2024 23 (L)  37 - 145 mcg/dL Final    Iron Saturation (TSAT) 08/21/2024 6 (L)  20 - 50 % Final    Transferrin 08/21/2024 264  200 - 360 mg/dL Final    TIBC 08/21/2024 393  298 - 536 mcg/dL Final    Ferritin 08/21/2024 16.94  13.00 - 150.00 ng/mL Final    Vitamin B-12 08/21/2024 313  211 - 946 pg/mL Final    WBC 08/21/2024 5.93  3.40 - 10.80 10*3/mm3 Final    RBC 08/21/2024 4.39  3.77 - 5.28 10*6/mm3 Final    Hemoglobin 08/21/2024 11.9 (L)  12.0 - 15.9 g/dL Final    Hematocrit 08/21/2024 37.0  34.0 - 46.6 % Final    MCV 08/21/2024 84.3  79.0 - 97.0 fL Final    MCH 08/21/2024 27.1  26.6 - 33.0 pg Final    MCHC 08/21/2024 32.2  31.5 - 35.7 g/dL Final    RDW 08/21/2024 14.0  12.3 - 15.4 % Final    RDW-SD 08/21/2024 42.2  37.0 - 54.0 fl Final    MPV 08/21/2024 10.7  6.0 - 12.0 fL Final    Platelets 08/21/2024 293  140 - 450 10*3/mm3 Final    Neutrophil % 08/21/2024 62.2  42.7 - 76.0 % Final    Lymphocyte % 08/21/2024 23.9  19.6 - 45.3 % Final    Monocyte % 08/21/2024 8.4  5.0 - 12.0 % Final    Eosinophil % 08/21/2024 4.2  0.3 - 6.2 % Final    Basophil % 08/21/2024 1.0  0.0 - 1.5 % Final    Immature Grans % 08/21/2024 0.3  0.0 - 0.5 % Final    Neutrophils, Absolute 08/21/2024 3.68  1.70 - 7.00 10*3/mm3 Final    Lymphocytes, Absolute 08/21/2024 1.42  0.70 - 3.10 10*3/mm3 Final    Monocytes, Absolute 08/21/2024 0.50  0.10 - 0.90 10*3/mm3 Final    Eosinophils, Absolute 08/21/2024 0.25  0.00 - 0.40 10*3/mm3 Final    Basophils,  Absolute 08/21/2024 0.06  0.00 - 0.20 10*3/mm3 Final    Immature Grans, Absolute 08/21/2024 0.02  0.00 - 0.05 10*3/mm3 Final    nRBC 08/21/2024 0.0  0.0 - 0.2 /100 WBC Final        No results found.    ASSESSMENT: The patient is a very pleasant 68 y.o. female  with microcytic anemia.        PLAN:  1.  Anemia:  A.  I reviewed the lab results from 8/21/2024 with the patient. Her hemoglobin has improved to 11.9 with MCV of 84.3, with normal WBC and platelet count.   B. Her vitamin B12 level is normal at 313. She will continue monthly vitamin B12 replacement. This is prescribed by her PCP, FLOR Roldan.   C. We will repeat her labs including CBC and vitamin levels in 3 months.     2.  Iron deficiency:  A.  I reviewed with the patient that she is still iron deficient with iron saturation of 6% and ferritin of 17.   B.  We will arrange for repeat IV iron replacement using Injectafer x 2 doses starting next week.   C. She has completed her GI work up with capsule endoscopy done by Dr. Andrade. We will request a copy of this report.  She will need repeat colonoscopy in 10 years (April 2032).  Her colonoscopy and EGD were completed in April 2022.     3.  Hypothyroidism:  A.  She will continue levothyroxine 100 mcg daily for hypothyroidism.     4.  Depression:  A.  She will continue citalopram 20 mg daily for depression.     FOLLOW UP: 3 months with CBC, vitamin B12, and iron profile.    Yuliana Wood, APRN  8/23/2024

## 2024-08-30 ENCOUNTER — HOSPITAL ENCOUNTER (OUTPATIENT)
Facility: HOSPITAL | Age: 69
Discharge: HOME | End: 2024-08-30
Payer: MEDICARE

## 2024-08-30 VITALS
DIASTOLIC BLOOD PRESSURE: 67 MMHG | TEMPERATURE: 99.1 F | HEIGHT: 64 IN | WEIGHT: 213.8 LBS | BODY MASS INDEX: 36.5 KG/M2 | HEART RATE: 64 BPM | RESPIRATION RATE: 16 BRPM | SYSTOLIC BLOOD PRESSURE: 153 MMHG

## 2024-08-30 DIAGNOSIS — D50.9 IRON DEFICIENCY ANEMIA, UNSPECIFIED IRON DEFICIENCY ANEMIA TYPE: Primary | ICD-10-CM

## 2024-08-30 DIAGNOSIS — K90.9 IRON MALABSORPTION: ICD-10-CM

## 2024-08-30 PROCEDURE — 96375 TX/PRO/DX INJ NEW DRUG ADDON: CPT

## 2024-08-30 PROCEDURE — 63710000001 DIPHENHYDRAMINE PER 50 MG: Performed by: NURSE PRACTITIONER

## 2024-08-30 PROCEDURE — 25810000003 SODIUM CHLORIDE 0.9 % SOLUTION: Performed by: NURSE PRACTITIONER

## 2024-08-30 PROCEDURE — 96374 THER/PROPH/DIAG INJ IV PUSH: CPT

## 2024-08-30 PROCEDURE — 25010000002 FERRIC CARBOXYMALTOSE 750 MG/15ML SOLUTION 15 ML VIAL: Performed by: NURSE PRACTITIONER

## 2024-08-30 RX ORDER — DIPHENHYDRAMINE HCL 25 MG
25 CAPSULE ORAL ONCE
Status: COMPLETED | OUTPATIENT
Start: 2024-08-30 | End: 2024-08-30

## 2024-08-30 RX ORDER — SODIUM CHLORIDE 9 MG/ML
250 INJECTION, SOLUTION INTRAVENOUS ONCE
Status: COMPLETED | OUTPATIENT
Start: 2024-08-30 | End: 2024-08-30

## 2024-08-30 RX ORDER — FAMOTIDINE 10 MG/ML
20 INJECTION, SOLUTION INTRAVENOUS ONCE
Status: COMPLETED | OUTPATIENT
Start: 2024-08-30 | End: 2024-08-30

## 2024-08-30 RX ADMIN — FERRIC CARBOXYMALTOSE INJECTION 750 MG: 50 INJECTION, SOLUTION INTRAVENOUS at 13:46

## 2024-08-30 RX ADMIN — SODIUM CHLORIDE 250 ML: 9 INJECTION, SOLUTION INTRAVENOUS at 13:14

## 2024-08-30 RX ADMIN — FAMOTIDINE 20 MG: 10 INJECTION, SOLUTION INTRAVENOUS at 13:15

## 2024-08-30 RX ADMIN — DIPHENHYDRAMINE HYDROCHLORIDE 25 MG: 25 CAPSULE ORAL at 13:15

## 2024-09-06 ENCOUNTER — HOSPITAL ENCOUNTER (OUTPATIENT)
Facility: HOSPITAL | Age: 69
Discharge: HOME | End: 2024-09-06
Payer: MEDICARE

## 2024-09-06 VITALS
DIASTOLIC BLOOD PRESSURE: 70 MMHG | TEMPERATURE: 98.3 F | RESPIRATION RATE: 16 BRPM | HEIGHT: 64 IN | WEIGHT: 214.6 LBS | SYSTOLIC BLOOD PRESSURE: 133 MMHG | HEART RATE: 52 BPM | BODY MASS INDEX: 36.64 KG/M2

## 2024-09-06 DIAGNOSIS — K90.9 IRON MALABSORPTION: ICD-10-CM

## 2024-09-06 DIAGNOSIS — D50.9 IRON DEFICIENCY ANEMIA, UNSPECIFIED IRON DEFICIENCY ANEMIA TYPE: Primary | ICD-10-CM

## 2024-09-06 PROCEDURE — 25810000003 SODIUM CHLORIDE 0.9 % SOLUTION: Performed by: NURSE PRACTITIONER

## 2024-09-06 PROCEDURE — 96374 THER/PROPH/DIAG INJ IV PUSH: CPT

## 2024-09-06 PROCEDURE — 25010000002 FERRIC CARBOXYMALTOSE 750 MG/15ML SOLUTION 15 ML VIAL: Performed by: NURSE PRACTITIONER

## 2024-09-06 PROCEDURE — 63710000001 DIPHENHYDRAMINE PER 50 MG: Performed by: NURSE PRACTITIONER

## 2024-09-06 PROCEDURE — 96375 TX/PRO/DX INJ NEW DRUG ADDON: CPT

## 2024-09-06 RX ORDER — DIPHENHYDRAMINE HCL 25 MG
25 CAPSULE ORAL ONCE
Status: COMPLETED | OUTPATIENT
Start: 2024-09-06 | End: 2024-09-06

## 2024-09-06 RX ORDER — FAMOTIDINE 10 MG/ML
20 INJECTION, SOLUTION INTRAVENOUS ONCE
Status: COMPLETED | OUTPATIENT
Start: 2024-09-06 | End: 2024-09-06

## 2024-09-06 RX ORDER — SODIUM CHLORIDE 9 MG/ML
250 INJECTION, SOLUTION INTRAVENOUS ONCE
Status: COMPLETED | OUTPATIENT
Start: 2024-09-06 | End: 2024-09-06

## 2024-09-06 RX ADMIN — SODIUM CHLORIDE 250 ML: 9 INJECTION, SOLUTION INTRAVENOUS at 13:13

## 2024-09-06 RX ADMIN — FERRIC CARBOXYMALTOSE INJECTION 750 MG: 50 INJECTION, SOLUTION INTRAVENOUS at 13:45

## 2024-09-06 RX ADMIN — DIPHENHYDRAMINE HYDROCHLORIDE 25 MG: 25 CAPSULE ORAL at 13:14

## 2024-09-06 RX ADMIN — FAMOTIDINE 20 MG: 10 INJECTION, SOLUTION INTRAVENOUS at 13:14

## 2024-09-12 ENCOUNTER — OFFICE VISIT (OUTPATIENT)
Dept: FAMILY MEDICINE CLINIC | Facility: CLINIC | Age: 69
End: 2024-09-12
Payer: MEDICARE

## 2024-09-12 VITALS
HEART RATE: 57 BPM | SYSTOLIC BLOOD PRESSURE: 134 MMHG | TEMPERATURE: 98.5 F | WEIGHT: 215 LBS | HEIGHT: 64 IN | DIASTOLIC BLOOD PRESSURE: 78 MMHG | BODY MASS INDEX: 36.7 KG/M2 | OXYGEN SATURATION: 98 %

## 2024-09-12 DIAGNOSIS — F41.9 ANXIETY: Primary | ICD-10-CM

## 2024-09-12 DIAGNOSIS — E53.8 B12 DEFICIENCY: ICD-10-CM

## 2024-09-12 DIAGNOSIS — F41.9 ANXIETY: ICD-10-CM

## 2024-09-12 DIAGNOSIS — D50.9 IRON DEFICIENCY ANEMIA, UNSPECIFIED IRON DEFICIENCY ANEMIA TYPE: ICD-10-CM

## 2024-09-12 DIAGNOSIS — E03.9 ACQUIRED HYPOTHYROIDISM: ICD-10-CM

## 2024-09-12 PROCEDURE — 3075F SYST BP GE 130 - 139MM HG: CPT | Performed by: PHYSICIAN ASSISTANT

## 2024-09-12 PROCEDURE — 1160F RVW MEDS BY RX/DR IN RCRD: CPT | Performed by: PHYSICIAN ASSISTANT

## 2024-09-12 PROCEDURE — 1126F AMNT PAIN NOTED NONE PRSNT: CPT | Performed by: PHYSICIAN ASSISTANT

## 2024-09-12 PROCEDURE — 1159F MED LIST DOCD IN RCRD: CPT | Performed by: PHYSICIAN ASSISTANT

## 2024-09-12 PROCEDURE — 99213 OFFICE O/P EST LOW 20 MIN: CPT | Performed by: PHYSICIAN ASSISTANT

## 2024-09-12 PROCEDURE — 3078F DIAST BP <80 MM HG: CPT | Performed by: PHYSICIAN ASSISTANT

## 2024-09-12 RX ORDER — ALPRAZOLAM 0.25 MG
0.25 TABLET ORAL NIGHTLY PRN
Qty: 30 TABLET | Refills: 5 | Status: SHIPPED | OUTPATIENT
Start: 2024-09-12

## 2024-09-12 NOTE — PROGRESS NOTES
Subjective   Jeane Martines is a 68 y.o. female  Anxiety (Refill on alprazolam)      History of Present Illness  History of Present Illness  The patient is a 68-year-old female who is coming in today to follow up on generalized anxiety disorder. She was seen recently by hematology on 08/23/2023 for her chronic anemia. I have reviewed this note in full detail.    She reports feeling well overall. She received two iron infusions last week, which she believes have improved her condition. She continues to receive B12 injections. She takes Xanax nightly, which aids her sleep. She also receives injections in her left eye every six weeks to maintain her vision. She notes that she often feels hot.    The following portions of the patient's history were reviewed and updated as appropriate: allergies, current medications, past social history and problem list    Review of Systems   Constitutional:  Negative for appetite change and fatigue.   Respiratory:  Negative for chest tightness and shortness of breath.    Gastrointestinal:  Negative for abdominal pain, diarrhea and nausea.   Neurological:  Negative for dizziness, tremors, weakness, light-headedness and headaches.   Psychiatric/Behavioral:  Positive for sleep disturbance. Negative for agitation, behavioral problems, confusion, decreased concentration, dysphoric mood and suicidal ideas. The patient is nervous/anxious.         Stable on medication       Objective     Vitals:    09/12/24 1153   BP: 134/78   Pulse: 57   Temp: 98.5 °F (36.9 °C)   SpO2: 98%       Physical Exam  Vitals and nursing note reviewed.   Constitutional:       General: She is not in acute distress.     Appearance: Normal appearance. She is well-developed. She is not ill-appearing, toxic-appearing or diaphoretic.   Eyes:      Conjunctiva/sclera: Conjunctivae normal.   Cardiovascular:      Rate and Rhythm: Normal rate and regular rhythm.   Pulmonary:      Effort: Pulmonary effort is normal.       Breath sounds: Normal breath sounds.   Neurological:      Mental Status: She is alert and oriented to person, place, and time.      Coordination: Coordination normal.   Psychiatric:         Attention and Perception: She is attentive.         Mood and Affect: Mood normal.         Behavior: Behavior normal.         Thought Content: Thought content normal.         Judgment: Judgment normal.       Physical Exam      Assessment & Plan   Assessment & Plan  1. Generalized Anxiety Disorder.  Her blood pressure readings are within the normal range. She reports that Xanax helps her rest well at night. A refill for Xanax will be sent to her [facility] in [place]. She is advised to contact the pharmacist to ensure they pull up the new prescription number when needed.    2. Chronic Anemia.  She recently received two iron infusions and reports feeling much better afterward. Hematology will recheck her levels in 3 months. She is advised to come in and check her labs if she starts feeling tired again. She will continue her B12 shots, and a year's worth of refills was provided in March.    3. Central Retinal Vein Occlusion of the Left Eye.  She receives eye injections every 6 weeks, which are helping to maintain her vision. She reports that the procedure involves numbing drops and is not too uncomfortable.    4. Medication Management.  Refills for B12, thyroid medication, and citalopram were provided in March and should last until then.       Diagnoses and all orders for this visit:    1. Anxiety (Primary)    2. Iron deficiency anemia, unspecified iron deficiency anemia type    3. B12 deficiency    4. Acquired hypothyroidism    Electronic prescription will be submitted for a 1 month supply of current dosage of Xanax 0.25 mg 1 nightly as needed for anxiety #30 with 5 refills per Dr. Garcia.  Follow-up in 6 months for recheck.  Patient will also be following up next month for her annual wellness visit for Medicare.    As  part of this patient's treatment plan, patient will be prescribed controlled substances. The patient has been made aware of appropriate use of such medications, including potential risk of somnolence, limited ability to drive and /or work safely, and potential for dependence or overdose. It has also been made clear that these medications are for use by this patient only, without concomitant use of alcohol or other substances unless prescribed.Controlled substance status of medication discussed with patient, discussed risks of medication including abuse potential and diversion potential and need to follow up for reevaluation appointment in order to receive further refills.    Part of this note may be an electronic transcription/translation of spoken language to printed text using the Dragon Dictation System.        Patient or patient representative verbalized consent for the use of Ambient Listening during the visit with  Vijaya Figueroa PA-C for chart documentation. 9/12/2024  13:10 EDT  Answers submitted by the patient for this visit:  Other (Submitted on 9/11/2024)  Please describe your symptoms.: Medicare wellness check up and refills  Have you had these symptoms before?: Yes  How long have you been having these symptoms?: Greater than 2 weeks  Primary Reason for Visit (Submitted on 9/11/2024)  What is the primary reason for your visit?: Problem Not Listed

## 2024-10-09 ENCOUNTER — TRANSCRIBE ORDERS (OUTPATIENT)
Dept: ADMINISTRATIVE | Facility: HOSPITAL | Age: 69
End: 2024-10-09
Payer: MEDICARE

## 2024-10-09 DIAGNOSIS — Z12.31 SCREENING MAMMOGRAM FOR BREAST CANCER: Primary | ICD-10-CM

## 2024-10-25 ENCOUNTER — OFFICE VISIT (OUTPATIENT)
Dept: FAMILY MEDICINE CLINIC | Facility: CLINIC | Age: 69
End: 2024-10-25
Payer: MEDICARE

## 2024-10-25 VITALS
BODY MASS INDEX: 36.84 KG/M2 | HEIGHT: 64 IN | OXYGEN SATURATION: 99 % | SYSTOLIC BLOOD PRESSURE: 134 MMHG | DIASTOLIC BLOOD PRESSURE: 76 MMHG | WEIGHT: 215.8 LBS | TEMPERATURE: 97.9 F | HEART RATE: 68 BPM

## 2024-10-25 DIAGNOSIS — L65.9 HAIR LOSS: ICD-10-CM

## 2024-10-25 DIAGNOSIS — Z00.00 MEDICARE ANNUAL WELLNESS VISIT, SUBSEQUENT: Primary | ICD-10-CM

## 2024-10-25 DIAGNOSIS — Z00.00 GENERAL MEDICAL EXAM: ICD-10-CM

## 2024-10-25 DIAGNOSIS — E55.9 VITAMIN D DEFICIENCY: ICD-10-CM

## 2024-10-25 DIAGNOSIS — Z23 IMMUNIZATION DUE: ICD-10-CM

## 2024-10-25 DIAGNOSIS — I10 PRIMARY HYPERTENSION: ICD-10-CM

## 2024-10-25 RX ORDER — METOPROLOL SUCCINATE 50 MG/1
50 TABLET, EXTENDED RELEASE ORAL DAILY
Qty: 90 TABLET | Refills: 1 | Status: SHIPPED | OUTPATIENT
Start: 2024-10-25

## 2024-10-25 NOTE — PROGRESS NOTES
Subjective   Jeane Martines is a 69 y.o. female  Medicare Wellness-subsequent (Medicare wellness), Hypertension (Refill on metoprolol ), and Annual Exam      Hypertension      History of Present Illness    Patient presents today for a preventive medical visit.  Patient is here to determine screening labs and tests that are due and to determine immunization status as well.  Patient will be counseled regarding preventative medicine issues such as regular exercise and healthy diet as well.  The following portions of the patient's history were reviewed and updated as appropriate: allergies, current medications, past social history and problem list    Review of Systems   Constitutional: Negative.    HENT: Negative.     Eyes: Negative.    Respiratory: Negative.     Cardiovascular: Negative.    Gastrointestinal: Negative.    Endocrine: Negative.    Genitourinary: Negative.    Musculoskeletal: Negative.    Skin: Negative.         Hair thinning   Allergic/Immunologic: Negative.    Neurological: Negative.    Hematological: Negative.    Psychiatric/Behavioral: Negative.     All other systems reviewed and are negative.      Objective     Vitals:    10/25/24 1516   BP: 134/76   Pulse: 68   Temp: 97.9 °F (36.6 °C)   SpO2: 99%       Physical Exam  Vitals and nursing note reviewed.   Constitutional:       General: She is not in acute distress.     Appearance: Normal appearance. She is well-developed. She is not ill-appearing, toxic-appearing or diaphoretic.      Comments: BMI37   HENT:      Head: Normocephalic and atraumatic.      Right Ear: External ear normal.      Left Ear: External ear normal.   Eyes:      Conjunctiva/sclera: Conjunctivae normal.      Pupils: Pupils are equal, round, and reactive to light.   Neck:      Thyroid: No thyromegaly.      Vascular: No carotid bruit or JVD.   Cardiovascular:      Rate and Rhythm: Normal rate and regular rhythm.      Pulses: Normal pulses.      Heart sounds: Normal heart sounds. No  murmur heard.  Pulmonary:      Effort: Pulmonary effort is normal. No respiratory distress.      Breath sounds: Normal breath sounds.   Abdominal:      General: Bowel sounds are normal.      Palpations: Abdomen is soft. There is no mass.      Tenderness: There is no abdominal tenderness.   Musculoskeletal:         General: No swelling. Normal range of motion.      Cervical back: Normal range of motion and neck supple.   Lymphadenopathy:      Cervical: No cervical adenopathy.   Skin:     General: Skin is warm and dry.      Findings: No lesion or rash.   Neurological:      Mental Status: She is alert and oriented to person, place, and time.      Cranial Nerves: No cranial nerve deficit.      Sensory: No sensory deficit.      Motor: No weakness.      Coordination: Coordination normal.      Gait: Gait normal.      Deep Tendon Reflexes: Reflexes are normal and symmetric.   Psychiatric:         Mood and Affect: Mood normal.         Behavior: Behavior normal.         Thought Content: Thought content normal.         Judgment: Judgment normal.       ECG 12 Lead    Date/Time: 10/25/2024 4:10 PM  Performed by: Vijaya Figueroa PA-C    Authorized by: Vijaya Figueroa PA-C  Comparison: compared with previous ECG from 7/12/2023  Similar to previous ECG  Rhythm: sinus bradycardia  Rate: bradycardic  BPM: 57  Conduction: conduction normal  ST Segments: ST segments normal  T Waves: T waves normal  QRS axis: normal  Other: no other findings    Clinical impression: non-specific ECG          Physical Exam    Discussed preventative medicine issues with patient including regular exercise, healthy diet, stress reduction, adequate sleep and recommended age-appropriate screening studies.  Assessment & Plan   Assessment & Plan      Diagnoses and all orders for this visit:    1. Medicare annual wellness visit, subsequent (Primary)    2. General medical exam    3. Immunization due  -     Fluzone High-Dose 65+yrs ([id number])    4. Vitamin D  deficiency  -     Vitamin D,25-Hydroxy; Future    5. Hair loss  -     Zinc; Future    Other orders  -     metoprolol succinate XL (TOPROL-XL) 50 MG 24 hr tablet; Take 1 tablet by mouth Daily.  Dispense: 90 tablet; Refill: 1  -     ECG 12 Lead         Patient or patient representative verbalized consent for the use of Ambient Listening during the visit with  Vijaya Figueroa PA-C for chart documentation. 10/25/2024  16:11 EDT

## 2024-10-25 NOTE — PROGRESS NOTES
The ABCs of the Annual Wellness Visit  Subsequent Medicare Wellness Visit    Chief Complaint   Patient presents with    Medicare Wellness-subsequent     Medicare wellness    Hypertension     Refill on metoprolol     Annual Exam      Subjective   History of Present Illness:  Jeane Martines is a 69 y.o. female who presents for a Subsequent Medicare Wellness Visit.  History of Present Illness  The patient is a 69-year-old female who presents today for an annual physical and a Medicare wellness visit.    She reports no history of heart attacks. Her anemia is well-managed with infusions every three months, which have been beneficial. She is not currently taking any aspirin or blood thinners.    She has received her influenza vaccine but has not yet received the shingles vaccine.    She has noticed hair loss and is considering a supplement for hair, skin, and nails but has not started any yet. She reports no scalp irritation or dermatological issues.    Her bowel movements are regular, and she is urinating without difficulty. She maintains good hydration by drinking plenty of water.    IMMUNIZATIONS  She has received her influenza vaccine.    The following portions of the patient's history were reviewed and   updated as appropriate: allergies, current medications, past medical history, past social history, past surgical history, and problem list.    Compared to one year ago, the patient feels her physical   health is better.    Compared to one year ago, the patient feels her mental   health is better.    Recent Hospitalizations:  This patient has had a [facility] admission record on file within the last 365 days.    Current Medical Providers:  Patient Care Team:  Vijaya Figueroa PA-C as PCP - General (Family Medicine)  Henrietta Miller APRN as Nurse Practitioner (Nurse Practitioner)  Yuliana Wood APRN as Nurse Practitioner (Hematology and Oncology)    Outpatient Medications Prior to Visit   Medication  "Sig Dispense Refill    ALPRAZolam (Xanax) 0.25 MG tablet Take 1 tablet by mouth At Night As Needed for Anxiety or Sleep. 30 tablet 5    citalopram (CeleXA) 20 MG tablet Take 1 tablet by mouth Daily. 90 tablet 3    cyanocobalamin 1000 MCG/ML injection ADMINISTER 1 MILLILITER INTRAMUSCULARLY ONCE MONTHLY FOR PERNICIOUS ANEMIA 1 mL 11    fluticasone (FLONASE) 50 MCG/ACT nasal spray Administer 2 sprays into the nostril(s) as directed by provider Daily.      levothyroxine (Synthroid) 100 MCG tablet Take 1 tablet by mouth Every Morning. 90 tablet 3    loratadine (Claritin) 10 MG tablet Take 1 tablet by mouth Daily. Take 1 tablet every day . 90 tablet 3    Syringe/Needle, Disp, (B-D SYRINGE/NEEDLE 1CC/25GX5/8) 25G X 5/8\" 1 ML misc Use 1 mL Every 30 (Thirty) Days. 3 each 4    Timolol Maleate, Once-Daily, 0.5 % solution       UltiCare Tuberculin Safety Syr 25G X 5/8\" 1 ML misc       metoprolol succinate XL (TOPROL-XL) 50 MG 24 hr tablet Take 1 tablet by mouth Daily. 90 tablet 1     No facility-administered medications prior to visit.       No opioid medication identified on active medication list. I have reviewed chart for other potential  high risk medication/s and harmful drug interactions in the elderly.        Aspirin is not on active medication list.  Aspirin use is not indicated based on review of current medical condition/s. Risk of harm outweighs potential benefits.  .    Patient Active Problem List   Diagnosis    Essential hypertension    Hypothyroidism    Allergic rhinitis    Anxiety    B12 deficiency    Iron deficiency    Microcytic anemia    Iron deficiency anemia    Iron malabsorption    PACO (obstructive sleep apnea)    Central retinal vein occlusion of left eye     Advance Care Planning  ACP discussion was held with the patient during this visit.      Review of Systems      Objective      Vitals:    10/25/24 1516   BP: 134/76   Pulse: 68   Temp: 97.9 °F (36.6 °C)   SpO2: 99%   Weight: 97.9 kg (215 lb 12.8 oz) " "  Height: 162.6 cm (64\")   PainSc:   1     BMI Readings from Last 1 Encounters:   10/25/24 37.04 kg/m²   BMI is within normal parameters. No follow-up required.  BMI Readings from Last 1 Encounters:   10/25/24 37.04 kg/m²   BMI is above normal parameters. Recommendations include: Information on healthy weight added to patient's after visit summary    Does the patient have evidence of cognitive impairment? No    Physical Exam  Physical Exam         Results  Testing  EKG shows no changes compared to last year.           HEALTH RISK ASSESSMENT    Smoking Status:  Social History     Tobacco Use   Smoking Status Never   Smokeless Tobacco Never     Alcohol Consumption:  Social History     Substance and Sexual Activity   Alcohol Use Never     Fall Risk Screen:    OM[facility] Fall Risk Assessment was completed, and patient is at LOW risk for falls.Assessment completed on:10/25/2024    Depression Screening:      10/25/2024     3:16 PM   PHQ-2/PHQ-9 Depression Screening   Little interest or pleasure in doing things Several days   Feeling down, depressed, or hopeless Not at all   How difficult have these problems made it for you to do your work, take care of things at home, or get along with other people? Somewhat difficult       Health Habits and Functional and Cognitive Screening:      10/25/2024     3:15 PM   Functional & Cognitive Status   Do you have difficulty preparing food and eating? No   Do you have difficulty bathing yourself, getting dressed or grooming yourself? No   Do you have difficulty using the toilet? No   Do you have difficulty moving around from place to place? No   Do you have trouble with steps or getting out of a bed or a chair? No   Current Diet Well Balanced Diet   Dental Exam Up to date   Eye Exam Up to date   Exercise (times per week) 2 times per week   Current Exercises Include Treadmill;Cardiovascular Workout   Do you need help using the phone?  No   Are you deaf or do you have serious difficulty " hearing?  No   Do you need help to go to places out of walking distance? No   Do you need help shopping? No   Do you need help preparing meals?  No   Do you need help with housework?  No   Do you need help with laundry? No   Do you need help taking your medications? No   Do you need help managing money? No   Do you ever drive or ride in a car without wearing a seat belt? No   Have you felt unusual stress, anger or loneliness in the last month? No   Who do you live with? Spouse   If you need help, do you have trouble finding someone available to you? No   Have you been bothered in the last four weeks by sexual problems? No   Do you have difficulty concentrating, remembering or making decisions? No       Age-appropriate Screening Schedule:  Refer to the list below for future screening recommendations based on patient's age, sex and/or medical conditions. Orders for these recommended tests are listed in the plan section. The patient has been provided with a written plan.    Health Maintenance   Topic Date Due    MAMMOGRAM  12/08/2024    ZOSTER VACCINE (1 of 2) 10/25/2024 (Originally 10/1/2006)    COVID-19 Vaccine (4 - 2023-24 season) 01/14/2025 (Originally 9/1/2024)    Pneumococcal Vaccine 65+ (2 of 2 - PCV) 10/05/2027 (Originally 3/8/2023)    DXA SCAN  07/24/2025    ANNUAL WELLNESS VISIT  10/25/2025    BMI FOLLOWUP  10/25/2025    TDAP/TD VACCINES (3 - Td or Tdap) 12/21/2026    COLORECTAL CANCER SCREENING  04/12/2032    INFLUENZA VACCINE  Completed    HEPATITIS C SCREENING  Addressed              Assessment & Plan     CMS Preventative Services Quick Reference  Risk Factors Identified During Encounter  Immunizations Discussed/Encouraged: Shingrix  The above risks/problems have been discussed with the patient.  Follow up actions/plans if indicated are seen below in the Assessment/Plan Section.  Pertinent information has been shared with the patient in the After Visit Summary.    Diagnoses and all orders for this  visit:    1. Medicare annual wellness visit, subsequent (Primary)    2. General medical exam  -     ECG 12 Lead    3. Immunization due  -     Fluzone High-Dose 65+yrs ([id number])    4. Vitamin D deficiency  -     Vitamin D,25-Hydroxy; Future    5. Hair loss  -     Zinc; Future    6. Primary hypertension  -     ECG 12 Lead    Other orders  -     metoprolol succinate XL (TOPROL-XL) 50 MG 24 hr tablet; Take 1 tablet by mouth Daily.  Dispense: 90 tablet; Refill: 1      Assessment & Plan  1. Annual physical and Medicare wellness visit.  Her EKG results remain consistent with those from the previous year. The pneumonia vaccine is not due for another 3 years. She received her flu shot. She was advised to receive the COVID-19 vaccine at a pharmacy if desired. She was informed about the pros and cons of the shingles vaccine but decided not to receive it at this time. She does not have an advanced directive or living will and declined further information on it.    2. Anemia.  Her anemia is well-managed with regular infusions every 3 months, and her energy levels have improved. She was advised to continue with her current treatment regimen.    3. Hair loss.  She reported significant hair loss and was advised against taking biotin supplements as they can interfere with lab results, particularly thyroid tests. Saw Palmetto, a natural supplement, was recommended for its potential benefits in hair growth. Spironolactone, a mild diuretic, was mentioned as an option often recommended by dermatologists for hair loss. Minoxidil, a blood pressure medication, has shown promising results in small doses. Collagen peptides, which contain protein, were recommended for hair health. She was advised to ensure her thyroid function is optimal and to check her vitamin D and zinc levels. A prescription for Saw Palmetto was provided.     4. Medication Management.  Her current regimen of Celexa and alprazolam is proving beneficial. She is not on any  aspirin or blood thinners.      Follow Up:  Return for Next scheduled follow up.     An After Visit Summary and PPPS were given to the patient.           Patient or patient representative verbalized consent for the use of Ambient Listening during the visit with  Vijaya Figueroa PA-C for chart documentation. 10/25/2024  16:09 EDT

## 2024-11-05 ENCOUNTER — TELEPHONE (OUTPATIENT)
Dept: FAMILY MEDICINE CLINIC | Facility: CLINIC | Age: 69
End: 2024-11-05
Payer: MEDICARE

## 2024-11-05 NOTE — TELEPHONE ENCOUNTER
Patient was not sick when I saw her on 10/25 so I do not have any way of knowing what is going on with her and what medicine to call in without seeing her please offer patient an in person appointment for evaluation

## 2024-11-05 NOTE — TELEPHONE ENCOUNTER
Patient was seen on 10/25 for her wellness visit. Patient is now having a low grade fever, chest tightening and coughing. Patient is requesting something be called in.     Please advise and give patient a call back.

## 2024-11-12 ENCOUNTER — TELEPHONE (OUTPATIENT)
Dept: ONCOLOGY | Facility: CLINIC | Age: 69
End: 2024-11-12

## 2024-11-12 NOTE — TELEPHONE ENCOUNTER
Caller: Jeane Martines    Relationship to patient: Self    Best call back number: [phone]    Chief complaint: PATIENT CALLED TO RESCHEDULE     Type of visit: FOLLOW UP 1    Requested date: 11-22-24 EITHER LOCATION [place] OR [facility]  If rescheduling, when is the original appointment: 11-15-24

## 2024-11-21 ENCOUNTER — LAB (OUTPATIENT)
Dept: LAB | Facility: HOSPITAL | Age: 69
End: 2024-11-21
Payer: MEDICARE

## 2024-11-21 DIAGNOSIS — L65.9 HAIR LOSS: ICD-10-CM

## 2024-11-21 DIAGNOSIS — K90.9 IRON MALABSORPTION: ICD-10-CM

## 2024-11-21 DIAGNOSIS — D50.9 IRON DEFICIENCY ANEMIA, UNSPECIFIED IRON DEFICIENCY ANEMIA TYPE: ICD-10-CM

## 2024-11-21 DIAGNOSIS — E55.9 VITAMIN D DEFICIENCY: ICD-10-CM

## 2024-11-21 LAB
25(OH)D3 SERPL-MCNC: 22.5 NG/ML (ref 30–100)
BASOPHILS # BLD AUTO: 0.06 10*3/MM3 (ref 0–0.2)
BASOPHILS NFR BLD AUTO: 1.1 % (ref 0–1.5)
DEPRECATED RDW RBC AUTO: 42 FL (ref 37–54)
EOSINOPHIL # BLD AUTO: 0.2 10*3/MM3 (ref 0–0.4)
EOSINOPHIL NFR BLD AUTO: 3.5 % (ref 0.3–6.2)
ERYTHROCYTE [DISTWIDTH] IN BLOOD BY AUTOMATED COUNT: 13.5 % (ref 12.3–15.4)
FERRITIN SERPL-MCNC: 19.18 NG/ML (ref 13–150)
HCT VFR BLD AUTO: 35.7 % (ref 34–46.6)
HGB BLD-MCNC: 11.6 G/DL (ref 12–15.9)
IMM GRANULOCYTES # BLD AUTO: 0.02 10*3/MM3 (ref 0–0.05)
IMM GRANULOCYTES NFR BLD AUTO: 0.4 % (ref 0–0.5)
IRON 24H UR-MRATE: 27 MCG/DL (ref 37–145)
IRON SATN MFR SERPL: 7 % (ref 20–50)
LYMPHOCYTES # BLD AUTO: 1.42 10*3/MM3 (ref 0.7–3.1)
LYMPHOCYTES NFR BLD AUTO: 24.9 % (ref 19.6–45.3)
MCH RBC QN AUTO: 28.2 PG (ref 26.6–33)
MCHC RBC AUTO-ENTMCNC: 32.5 G/DL (ref 31.5–35.7)
MCV RBC AUTO: 86.9 FL (ref 79–97)
MONOCYTES # BLD AUTO: 0.48 10*3/MM3 (ref 0.1–0.9)
MONOCYTES NFR BLD AUTO: 8.4 % (ref 5–12)
NEUTROPHILS NFR BLD AUTO: 3.53 10*3/MM3 (ref 1.7–7)
NEUTROPHILS NFR BLD AUTO: 61.7 % (ref 42.7–76)
NRBC BLD AUTO-RTO: 0 /100 WBC (ref 0–0.2)
PLATELET # BLD AUTO: 301 10*3/MM3 (ref 140–450)
PMV BLD AUTO: 11.6 FL (ref 6–12)
RBC # BLD AUTO: 4.11 10*6/MM3 (ref 3.77–5.28)
TIBC SERPL-MCNC: 384 MCG/DL (ref 298–536)
TRANSFERRIN SERPL-MCNC: 258 MG/DL (ref 200–360)
VIT B12 BLD-MCNC: 360 PG/ML (ref 211–946)
WBC NRBC COR # BLD AUTO: 5.71 10*3/MM3 (ref 3.4–10.8)

## 2024-11-21 PROCEDURE — 84466 ASSAY OF TRANSFERRIN: CPT

## 2024-11-21 PROCEDURE — 82607 VITAMIN B-12: CPT

## 2024-11-21 PROCEDURE — 85025 COMPLETE CBC W/AUTO DIFF WBC: CPT

## 2024-11-21 PROCEDURE — 36415 COLL VENOUS BLD VENIPUNCTURE: CPT

## 2024-11-21 PROCEDURE — 82728 ASSAY OF FERRITIN: CPT

## 2024-11-21 PROCEDURE — 83540 ASSAY OF IRON: CPT

## 2024-11-21 PROCEDURE — 82306 VITAMIN D 25 HYDROXY: CPT

## 2024-11-21 PROCEDURE — 84630 ASSAY OF ZINC: CPT

## 2024-11-22 ENCOUNTER — OFFICE VISIT (OUTPATIENT)
Age: 69
End: 2024-11-22
Payer: MEDICARE

## 2024-11-22 VITALS
SYSTOLIC BLOOD PRESSURE: 135 MMHG | BODY MASS INDEX: 36.37 KG/M2 | OXYGEN SATURATION: 98 % | HEART RATE: 66 BPM | DIASTOLIC BLOOD PRESSURE: 70 MMHG | TEMPERATURE: 97.6 F | HEIGHT: 64 IN | WEIGHT: 213 LBS | RESPIRATION RATE: 12 BRPM

## 2024-11-22 DIAGNOSIS — K90.9 IRON MALABSORPTION: ICD-10-CM

## 2024-11-22 DIAGNOSIS — D50.9 IRON DEFICIENCY ANEMIA, UNSPECIFIED IRON DEFICIENCY ANEMIA TYPE: ICD-10-CM

## 2024-11-22 DIAGNOSIS — E61.1 IRON DEFICIENCY: Primary | ICD-10-CM

## 2024-11-22 RX ORDER — DIPHENHYDRAMINE HCL 25 MG
25 CAPSULE ORAL ONCE
OUTPATIENT
Start: 2024-11-29

## 2024-11-22 RX ORDER — FAMOTIDINE 10 MG/ML
20 INJECTION, SOLUTION INTRAVENOUS ONCE
OUTPATIENT
Start: 2024-12-06

## 2024-11-22 RX ORDER — DIPHENHYDRAMINE HCL 25 MG
25 CAPSULE ORAL ONCE
OUTPATIENT
Start: 2024-12-06

## 2024-11-22 RX ORDER — SODIUM CHLORIDE 9 MG/ML
250 INJECTION, SOLUTION INTRAVENOUS ONCE
OUTPATIENT
Start: 2024-12-06

## 2024-11-22 RX ORDER — SODIUM CHLORIDE 9 MG/ML
250 INJECTION, SOLUTION INTRAVENOUS ONCE
OUTPATIENT
Start: 2024-11-29

## 2024-11-22 RX ORDER — FAMOTIDINE 10 MG/ML
20 INJECTION, SOLUTION INTRAVENOUS ONCE
OUTPATIENT
Start: 2024-11-29

## 2024-11-22 NOTE — PROGRESS NOTES
DATE OF VISIT: 11/22/2024    REASON FOR VISIT: Followup for microcytic anemia     PROBLEM LIST:  1.  Anemia  2.  Microcytosis:  A.  Negative EGD and colonoscopy done by Dr. Andrade April 12, 2022  3.  Hypothyroid  4.  Hypertension  5.  Depression    HISTORY OF PRESENT ILLNESS: The patient is a very pleasant 69 y.o. female with past medical history significant for microcytic anemia with iron deficiency diagnosed May 2021. She presented with fatigue, shortness of breath, and blurry vision. Her hemoglobin was found to be 9.2 with MCV 72. The patient received 6 doses of IV iron replacement using Ferrlecit. The patient is here today for scheduled follow up visit with labs.     SUBJECTIVE: The patient is here today by herself. She has been doing fairly well. She had COVID and strep throat about two weeks ago. She has completed her antibiotics and is feeling better, but complains of ongoing fatigue. She denies abdominal pain, changes in stool color, or evidence of overt bleeding. She has not had any dizziness or shortness of breath. She did feel better after receiving iron infusions last time.     Past History:  Medical History: has a past medical history of Allergic (3 yrs), Anemia, Depression, Hypertension, Thyroid disease, and Visual impairment.   Surgical History: has a past surgical history that includes Oophorectomy; Hysterectomy; Tubal ligation; Colonoscopy; and [place] tooth extraction.   Family History: family history includes Breast cancer in her paternal aunt; Breast cancer (age of onset: 78) in her sister; Diabetes in her maternal grandmother; Heart attack in her brother and mother; Heart disease in an other family member; Hypertension in her father, mother, and sister; Obesity in an other family member; Stroke in an other family member; Thyroid disease in her father; Vision loss in her father.   Social History: reports that she has never smoked. She has never used smokeless tobacco. She reports that she does  "not drink alcohol and does not use drugs.    (Not in a hospital admission)     Allergies: Patient has no known allergies.    Review of Systems   Constitutional:  Positive for fatigue.       PHYSICAL EXAMINATION:   /70   Pulse 66   Temp 97.6 °F (36.4 °C)   Resp 12   Ht 162.6 cm (64\")   Wt 96.6 kg (213 lb)   SpO2 98%   BMI 36.56 kg/m²    Pain Score    11/22/24 1033   PainSc: 0-No pain              ECOG Performance Status: 0 - Asymptomatic  General Appearance:  alert, cooperative, no apparent distress and appears stated age   Lungs:   Clear to auscultation bilaterally; respirations regular, even, and unlabored bilaterally   Heart:  Regular rate and rhythm, no murmurs appreciated   Abdomen:   Soft, non-tender, non-distended, and no organomegaly     Lab on 11/21/2024   Component Date Value Ref Range Status    Ferritin 11/21/2024 19.18  13.00 - 150.00 ng/mL Final    Iron 11/21/2024 27 (L)  37 - 145 mcg/dL Final    Iron Saturation (TSAT) 11/21/2024 7 (L)  20 - 50 % Final    Transferrin 11/21/2024 258  200 - 360 mg/dL Final    TIBC 11/21/2024 384  298 - 536 mcg/dL Final    Vitamin B-12 11/21/2024 360  211 - 946 pg/mL Final    25 Hydroxy, Vitamin D 11/21/2024 22.5 (L)  30.0 - 100.0 ng/ml Final    WBC 11/21/2024 5.71  3.40 - 10.80 10*3/mm3 Final    RBC 11/21/2024 4.11  3.77 - 5.28 10*6/mm3 Final    Hemoglobin 11/21/2024 11.6 (L)  12.0 - 15.9 g/dL Final    Hematocrit 11/21/2024 35.7  34.0 - 46.6 % Final    MCV 11/21/2024 86.9  79.0 - 97.0 fL Final    MCH 11/21/2024 28.2  26.6 - 33.0 pg Final    MCHC 11/21/2024 32.5  31.5 - 35.7 g/dL Final    RDW 11/21/2024 13.5  12.3 - 15.4 % Final    RDW-SD 11/21/2024 42.0  37.0 - 54.0 fl Final    MPV 11/21/2024 11.6  6.0 - 12.0 fL Final    Platelets 11/21/2024 301  140 - 450 10*3/mm3 Final    Neutrophil % 11/21/2024 61.7  42.7 - 76.0 % Final    Lymphocyte % 11/21/2024 24.9  19.6 - 45.3 % Final    Monocyte % 11/21/2024 8.4  5.0 - 12.0 % Final    Eosinophil % 11/21/2024 3.5  0.3 - " 6.2 % Final    Basophil % 11/21/2024 1.1  0.0 - 1.5 % Final    Immature Grans % 11/21/2024 0.4  0.0 - 0.5 % Final    Neutrophils, Absolute 11/21/2024 3.53  1.70 - 7.00 10*3/mm3 Final    Lymphocytes, Absolute 11/21/2024 1.42  0.70 - 3.10 10*3/mm3 Final    Monocytes, Absolute 11/21/2024 0.48  0.10 - 0.90 10*3/mm3 Final    Eosinophils, Absolute 11/21/2024 0.20  0.00 - 0.40 10*3/mm3 Final    Basophils, Absolute 11/21/2024 0.06  0.00 - 0.20 10*3/mm3 Final    Immature Grans, Absolute 11/21/2024 0.02  0.00 - 0.05 10*3/mm3 Final    nRBC 11/21/2024 0.0  0.0 - 0.2 /100 WBC Final        No results found.    ASSESSMENT: The patient is a very pleasant 69 y.o. female  with microcytic anemia.        PLAN:  1.  Anemia:  A.  I reviewed the lab results from 11/21/2024 with the patient. Her hemoglobin was down to 11.6 with MCV of 86.9. Her WBC and platelet count are normal.    B. Her vitamin B12 level is normal at 360. She will continue monthly vitamin B12 replacement. This is prescribed by her PCP, FLOR Roldan.   C. We will repeat her labs including CBC and vitamin levels in 3 months.     2.  Iron deficiency:  A.  I reviewed with the patient that she is still iron deficient with iron saturation of 7% and ferritin of 19.   B.  We will arrange for repeat IV iron replacement using Injectafer x 2 doses starting next week.   C. She has completed her GI work up with capsule endoscopy done by Dr. Andrade. We will request a copy of this report.  She will need repeat colonoscopy in 10 years (April 2032).  Her colonoscopy and EGD were completed in April 2022.     3.  Hypothyroidism:  A.  She will continue levothyroxine 100 mcg daily for hypothyroidism.     4.  Depression:  A.  She will continue citalopram 20 mg daily for depression.     FOLLOW UP: 3 months with CBC, vitamin B12, and iron profile.    Yuliana Wood, APRN  11/22/2024

## 2024-11-27 ENCOUNTER — HOSPITAL ENCOUNTER (OUTPATIENT)
Facility: HOSPITAL | Age: 69
Discharge: HOME | End: 2024-11-27
Admitting: NURSE PRACTITIONER
Payer: MEDICARE

## 2024-11-27 VITALS
RESPIRATION RATE: 18 BRPM | DIASTOLIC BLOOD PRESSURE: 76 MMHG | BODY MASS INDEX: 36.97 KG/M2 | WEIGHT: 215.4 LBS | HEART RATE: 73 BPM | SYSTOLIC BLOOD PRESSURE: 138 MMHG | TEMPERATURE: 99.1 F

## 2024-11-27 DIAGNOSIS — D50.9 IRON DEFICIENCY ANEMIA, UNSPECIFIED IRON DEFICIENCY ANEMIA TYPE: Primary | ICD-10-CM

## 2024-11-27 DIAGNOSIS — K90.9 IRON MALABSORPTION: ICD-10-CM

## 2024-11-27 PROCEDURE — 25010000002 FERRIC CARBOXYMALTOSE 750 MG/15ML SOLUTION 15 ML VIAL: Performed by: NURSE PRACTITIONER

## 2024-11-27 PROCEDURE — 96365 THER/PROPH/DIAG IV INF INIT: CPT

## 2024-11-27 PROCEDURE — 63710000001 DIPHENHYDRAMINE PER 50 MG: Performed by: NURSE PRACTITIONER

## 2024-11-27 PROCEDURE — 96375 TX/PRO/DX INJ NEW DRUG ADDON: CPT

## 2024-11-27 RX ORDER — SODIUM CHLORIDE 9 MG/ML
250 INJECTION, SOLUTION INTRAVENOUS ONCE
Status: DISCONTINUED | OUTPATIENT
Start: 2024-11-27 | End: 2024-11-28 | Stop reason: HOSPADM

## 2024-11-27 RX ORDER — FAMOTIDINE 10 MG/ML
20 INJECTION, SOLUTION INTRAVENOUS ONCE
Status: COMPLETED | OUTPATIENT
Start: 2024-11-27 | End: 2024-11-27

## 2024-11-27 RX ORDER — DICLOFENAC SODIUM AND MISOPROSTOL 50; 200 MG/1; UG/1
1 TABLET, DELAYED RELEASE ORAL 2 TIMES DAILY
COMMUNITY

## 2024-11-27 RX ORDER — DIPHENHYDRAMINE HCL 25 MG
25 CAPSULE ORAL ONCE
Status: COMPLETED | OUTPATIENT
Start: 2024-11-27 | End: 2024-11-27

## 2024-11-27 RX ADMIN — FAMOTIDINE 20 MG: 10 INJECTION, SOLUTION INTRAVENOUS at 14:41

## 2024-11-27 RX ADMIN — FERRIC CARBOXYMALTOSE INJECTION 750 MG: 50 INJECTION, SOLUTION INTRAVENOUS at 15:15

## 2024-11-27 RX ADMIN — DIPHENHYDRAMINE HYDROCHLORIDE 25 MG: 25 CAPSULE ORAL at 14:40

## 2024-11-28 LAB — ZINC SERPL-MCNC: 56 UG/DL (ref 44–115)

## 2024-12-04 ENCOUNTER — HOSPITAL ENCOUNTER (OUTPATIENT)
Facility: HOSPITAL | Age: 69
Discharge: HOME | End: 2024-12-04
Admitting: NURSE PRACTITIONER
Payer: MEDICARE

## 2024-12-04 ENCOUNTER — TELEPHONE (OUTPATIENT)
Dept: FAMILY MEDICINE CLINIC | Facility: CLINIC | Age: 69
End: 2024-12-04
Payer: MEDICARE

## 2024-12-04 VITALS
DIASTOLIC BLOOD PRESSURE: 92 MMHG | RESPIRATION RATE: 18 BRPM | TEMPERATURE: 98.7 F | HEIGHT: 64 IN | SYSTOLIC BLOOD PRESSURE: 162 MMHG | BODY MASS INDEX: 36.91 KG/M2 | HEART RATE: 60 BPM | WEIGHT: 216.2 LBS

## 2024-12-04 DIAGNOSIS — D50.9 IRON DEFICIENCY ANEMIA, UNSPECIFIED IRON DEFICIENCY ANEMIA TYPE: Primary | ICD-10-CM

## 2024-12-04 DIAGNOSIS — K90.9 IRON MALABSORPTION: ICD-10-CM

## 2024-12-04 PROCEDURE — 96375 TX/PRO/DX INJ NEW DRUG ADDON: CPT

## 2024-12-04 PROCEDURE — 63710000001 DIPHENHYDRAMINE PER 50 MG: Performed by: NURSE PRACTITIONER

## 2024-12-04 PROCEDURE — 96365 THER/PROPH/DIAG IV INF INIT: CPT

## 2024-12-04 PROCEDURE — 25010000002 FERRIC CARBOXYMALTOSE 750 MG/15ML SOLUTION 15 ML VIAL: Performed by: NURSE PRACTITIONER

## 2024-12-04 RX ORDER — FAMOTIDINE 10 MG/ML
20 INJECTION, SOLUTION INTRAVENOUS ONCE
Status: COMPLETED | OUTPATIENT
Start: 2024-12-04 | End: 2024-12-04

## 2024-12-04 RX ORDER — DIPHENHYDRAMINE HCL 25 MG
25 CAPSULE ORAL ONCE
Status: COMPLETED | OUTPATIENT
Start: 2024-12-04 | End: 2024-12-04

## 2024-12-04 RX ORDER — SODIUM CHLORIDE 9 MG/ML
250 INJECTION, SOLUTION INTRAVENOUS ONCE
Status: DISCONTINUED | OUTPATIENT
Start: 2024-12-04 | End: 2024-12-05 | Stop reason: HOSPADM

## 2024-12-04 RX ADMIN — DIPHENHYDRAMINE HYDROCHLORIDE 25 MG: 25 CAPSULE ORAL at 14:16

## 2024-12-04 RX ADMIN — FERRIC CARBOXYMALTOSE INJECTION 750 MG: 50 INJECTION, SOLUTION INTRAVENOUS at 14:51

## 2024-12-04 RX ADMIN — FAMOTIDINE 20 MG: 10 INJECTION, SOLUTION INTRAVENOUS at 14:17

## 2024-12-04 NOTE — TELEPHONE ENCOUNTER
You have called in a prescription for vitamin D because my test showed it was low, but my hematologist has recommended a multivitamin so should I still take the vitamin D with the multivitamin? Thank you

## 2025-02-21 ENCOUNTER — LAB (OUTPATIENT)
Facility: HOSPITAL | Age: 70
End: 2025-02-21
Payer: MEDICARE

## 2025-02-21 ENCOUNTER — OFFICE VISIT (OUTPATIENT)
Age: 70
End: 2025-02-21
Payer: MEDICARE

## 2025-02-21 VITALS
SYSTOLIC BLOOD PRESSURE: 168 MMHG | WEIGHT: 213 LBS | RESPIRATION RATE: 12 BRPM | TEMPERATURE: 97.3 F | BODY MASS INDEX: 36.37 KG/M2 | OXYGEN SATURATION: 97 % | DIASTOLIC BLOOD PRESSURE: 84 MMHG | HEIGHT: 64 IN | HEART RATE: 63 BPM

## 2025-02-21 DIAGNOSIS — D50.9 IRON DEFICIENCY ANEMIA, UNSPECIFIED IRON DEFICIENCY ANEMIA TYPE: ICD-10-CM

## 2025-02-21 DIAGNOSIS — K90.9 IRON MALABSORPTION: ICD-10-CM

## 2025-02-21 DIAGNOSIS — D50.9 IRON DEFICIENCY ANEMIA, UNSPECIFIED IRON DEFICIENCY ANEMIA TYPE: Primary | ICD-10-CM

## 2025-02-21 DIAGNOSIS — E61.1 IRON DEFICIENCY: ICD-10-CM

## 2025-02-21 LAB
BASOPHILS # BLD AUTO: 0.02 10*3/MM3 (ref 0–0.2)
BASOPHILS NFR BLD AUTO: 0.3 % (ref 0–1.5)
DEPRECATED RDW RBC AUTO: 42.8 FL (ref 37–54)
EOSINOPHIL # BLD AUTO: 0.3 10*3/MM3 (ref 0–0.4)
EOSINOPHIL NFR BLD AUTO: 4 % (ref 0.3–6.2)
ERYTHROCYTE [DISTWIDTH] IN BLOOD BY AUTOMATED COUNT: 13.2 % (ref 12.3–15.4)
FERRITIN SERPL-MCNC: 37.78 NG/ML (ref 13–150)
HCT VFR BLD AUTO: 41.6 % (ref 34–46.6)
HGB BLD-MCNC: 13.7 G/DL (ref 12–15.9)
IMM GRANULOCYTES # BLD AUTO: 0.03 10*3/MM3 (ref 0–0.05)
IMM GRANULOCYTES NFR BLD AUTO: 0.4 % (ref 0–0.5)
IRON 24H UR-MRATE: 47 MCG/DL (ref 37–145)
IRON SATN MFR SERPL: 12 % (ref 20–50)
LYMPHOCYTES # BLD AUTO: 1.7 10*3/MM3 (ref 0.7–3.1)
LYMPHOCYTES NFR BLD AUTO: 22.8 % (ref 19.6–45.3)
MCH RBC QN AUTO: 28.8 PG (ref 26.6–33)
MCHC RBC AUTO-ENTMCNC: 32.9 G/DL (ref 31.5–35.7)
MCV RBC AUTO: 87.6 FL (ref 79–97)
MONOCYTES # BLD AUTO: 0.68 10*3/MM3 (ref 0.1–0.9)
MONOCYTES NFR BLD AUTO: 9.1 % (ref 5–12)
NEUTROPHILS NFR BLD AUTO: 4.71 10*3/MM3 (ref 1.7–7)
NEUTROPHILS NFR BLD AUTO: 63.4 % (ref 42.7–76)
PLATELET # BLD AUTO: 288 10*3/MM3 (ref 140–450)
PMV BLD AUTO: 11.2 FL (ref 6–12)
RBC # BLD AUTO: 4.75 10*6/MM3 (ref 3.77–5.28)
TIBC SERPL-MCNC: 389 MCG/DL (ref 298–536)
TRANSFERRIN SERPL-MCNC: 261 MG/DL (ref 200–360)
VIT B12 BLD-MCNC: 299 PG/ML (ref 211–946)
WBC NRBC COR # BLD AUTO: 7.44 10*3/MM3 (ref 3.4–10.8)

## 2025-02-21 PROCEDURE — 82607 VITAMIN B-12: CPT

## 2025-02-21 PROCEDURE — 83540 ASSAY OF IRON: CPT

## 2025-02-21 PROCEDURE — 82728 ASSAY OF FERRITIN: CPT

## 2025-02-21 PROCEDURE — 3079F DIAST BP 80-89 MM HG: CPT | Performed by: INTERNAL MEDICINE

## 2025-02-21 PROCEDURE — 1126F AMNT PAIN NOTED NONE PRSNT: CPT | Performed by: INTERNAL MEDICINE

## 2025-02-21 PROCEDURE — 99214 OFFICE O/P EST MOD 30 MIN: CPT | Performed by: INTERNAL MEDICINE

## 2025-02-21 PROCEDURE — 3077F SYST BP >= 140 MM HG: CPT | Performed by: INTERNAL MEDICINE

## 2025-02-21 PROCEDURE — 84466 ASSAY OF TRANSFERRIN: CPT

## 2025-02-21 PROCEDURE — 85025 COMPLETE CBC W/AUTO DIFF WBC: CPT

## 2025-02-21 PROCEDURE — 36415 COLL VENOUS BLD VENIPUNCTURE: CPT

## 2025-02-21 NOTE — PROGRESS NOTES
DATE OF VISIT: 2/21/2025    REASON FOR VISIT: Followup for microcytic anemia     PROBLEM LIST:  1.  Anemia  2.  Microcytosis:  A.  Negative EGD and colonoscopy done by Dr. Andrade April 12, 2022  3.  Hypothyroid  4.  Hypertension  5.  Depression    HISTORY OF PRESENT ILLNESS: The patient is a very pleasant 69 y.o. female with past medical history significant for microcytic anemia with iron deficiency diagnosed May 2021. She presented with fatigue, shortness of breath, and blurry vision. Her hemoglobin was found to be 9.2 with MCV 72. The patient received 6 doses of IV iron replacement using Ferrlecit. The patient is here today for scheduled follow up visit with labs.     SUBJECTIVE: Jeane is here today by herself.  All in all she is doing well.  Denies any fever chills or night sweats.  She is trying to stay active.  No bleeding.    Past History:  Medical History: has a past medical history of Allergic (3 yrs), Anemia, Depression, Hypertension, Thyroid disease, and Visual impairment.   Surgical History: has a past surgical history that includes Oophorectomy; Hysterectomy; Tubal ligation; Colonoscopy; and [place] tooth extraction.   Family History: family history includes Breast cancer in her paternal aunt; Breast cancer (age of onset: 78) in her sister; Diabetes in her maternal grandmother; Heart attack in her brother and mother; Heart disease in an other family member; Hypertension in her father, mother, and sister; Obesity in an other family member; Stroke in an other family member; Thyroid disease in her father; Vision loss in her father.   Social History: reports that she has never smoked. She has never used smokeless tobacco. She reports that she does not drink alcohol and does not use drugs.    (Not in a hospital admission)     Allergies: Patient has no known allergies.    Review of Systems   Constitutional:  Positive for fatigue.   Cardiovascular: Negative.    Gastrointestinal: Negative.    Musculoskeletal:  " Positive for arthralgias.       PHYSICAL EXAMINATION:   /84   Pulse 63   Temp 97.3 °F (36.3 °C)   Resp 12   Ht 162.6 cm (64\")   Wt 96.6 kg (213 lb)   SpO2 97%   BMI 36.56 kg/m²    Pain Score    02/21/25 1358   PainSc: 0-No pain                ECOG Performance Status: 1 - Symptomatic but completely ambulatory  General Appearance:  alert, cooperative, no apparent distress and appears stated age   Lungs:   Clear to auscultation bilaterally; respirations regular, even, and unlabored bilaterally   Heart:  Regular rate and rhythm, no murmurs appreciated   Abdomen:   Soft, non-tender, non-distended, and no organomegaly     No visits with results within 2 Week(s) from this visit.   Latest known visit with results is:   Lab on 11/21/2024   Component Date Value Ref Range Status    Ferritin 11/21/2024 19.18  13.00 - 150.00 ng/mL Final    Iron 11/21/2024 27 (L)  37 - 145 mcg/dL Final    Iron Saturation (TSAT) 11/21/2024 7 (L)  20 - 50 % Final    Transferrin 11/21/2024 258  200 - 360 mg/dL Final    TIBC 11/21/2024 384  298 - 536 mcg/dL Final    Vitamin B-12 11/21/2024 360  211 - 946 pg/mL Final    Zinc 11/21/2024 56  44 - 115 ug/dL Final                                    Detection Limit = 5    25 Hydroxy, Vitamin D 11/21/2024 22.5 (L)  30.0 - 100.0 ng/ml Final    WBC 11/21/2024 5.71  3.40 - 10.80 10*3/mm3 Final    RBC 11/21/2024 4.11  3.77 - 5.28 10*6/mm3 Final    Hemoglobin 11/21/2024 11.6 (L)  12.0 - 15.9 g/dL Final    Hematocrit 11/21/2024 35.7  34.0 - 46.6 % Final    MCV 11/21/2024 86.9  79.0 - 97.0 fL Final    MCH 11/21/2024 28.2  26.6 - 33.0 pg Final    MCHC 11/21/2024 32.5  31.5 - 35.7 g/dL Final    RDW 11/21/2024 13.5  12.3 - 15.4 % Final    RDW-SD 11/21/2024 42.0  37.0 - 54.0 fl Final    MPV 11/21/2024 11.6  6.0 - 12.0 fL Final    Platelets 11/21/2024 301  140 - 450 10*3/mm3 Final    Neutrophil % 11/21/2024 61.7  42.7 - 76.0 % Final    Lymphocyte % 11/21/2024 24.9  19.6 - 45.3 % Final    Monocyte % " 11/21/2024 8.4  5.0 - 12.0 % Final    Eosinophil % 11/21/2024 3.5  0.3 - 6.2 % Final    Basophil % 11/21/2024 1.1  0.0 - 1.5 % Final    Immature Grans % 11/21/2024 0.4  0.0 - 0.5 % Final    Neutrophils, Absolute 11/21/2024 3.53  1.70 - 7.00 10*3/mm3 Final    Lymphocytes, Absolute 11/21/2024 1.42  0.70 - 3.10 10*3/mm3 Final    Monocytes, Absolute 11/21/2024 0.48  0.10 - 0.90 10*3/mm3 Final    Eosinophils, Absolute 11/21/2024 0.20  0.00 - 0.40 10*3/mm3 Final    Basophils, Absolute 11/21/2024 0.06  0.00 - 0.20 10*3/mm3 Final    Immature Grans, Absolute 11/21/2024 0.02  0.00 - 0.05 10*3/mm3 Final    nRBC 11/21/2024 0.0  0.0 - 0.2 /100 WBC Final        No results found.    ASSESSMENT: The patient is a very pleasant 69 y.o. female  with microcytic anemia.        PLAN:  1.  Anemia:  A.  I discussed with the patient with the results from November 21, 2024..    B. We will repeat her labs including CBC and vitamin levels in 6 months.     2.  Iron deficiency:  A.  I will follow-up on iron profile from today.  B.  The patient be treated with as needed IV iron for ferritin less than 30 or saturation less than 10%.  C. She has completed her GI work up with capsule endoscopy done by Dr. Andrade. We will request a copy of this report.  She will need repeat colonoscopy in 10 years (April 2032).  Her colonoscopy and EGD were completed in April 2022.     3.  Hypothyroidism:  A.  She will continue levothyroxine 100 mcg daily for hypothyroidism.     4.  Depression:  A.  She will continue citalopram 20 mg daily for depression.     FOLLOW UP: 6 months with CBC, vitamin B12, and iron profile.    France Aden MD  2/21/2025

## 2025-03-03 RX ORDER — CITALOPRAM HYDROBROMIDE 20 MG/1
20 TABLET ORAL DAILY
Qty: 90 TABLET | Refills: 3 | Status: SHIPPED | OUTPATIENT
Start: 2025-03-03

## 2025-03-14 ENCOUNTER — OFFICE VISIT (OUTPATIENT)
Dept: FAMILY MEDICINE CLINIC | Facility: CLINIC | Age: 70
End: 2025-03-14
Payer: MEDICARE

## 2025-03-14 VITALS
WEIGHT: 211.3 LBS | HEART RATE: 61 BPM | HEIGHT: 64 IN | OXYGEN SATURATION: 100 % | BODY MASS INDEX: 36.07 KG/M2 | DIASTOLIC BLOOD PRESSURE: 70 MMHG | SYSTOLIC BLOOD PRESSURE: 138 MMHG | TEMPERATURE: 98.9 F

## 2025-03-14 DIAGNOSIS — F41.1 GAD (GENERALIZED ANXIETY DISORDER): Primary | ICD-10-CM

## 2025-03-14 DIAGNOSIS — Z12.31 BREAST CANCER SCREENING BY MAMMOGRAM: ICD-10-CM

## 2025-03-14 DIAGNOSIS — Z51.81 ENCOUNTER FOR THERAPEUTIC DRUG MONITORING: ICD-10-CM

## 2025-03-14 DIAGNOSIS — M79.672 BILATERAL FOOT PAIN: ICD-10-CM

## 2025-03-14 DIAGNOSIS — M79.671 BILATERAL FOOT PAIN: ICD-10-CM

## 2025-03-14 DIAGNOSIS — F41.9 ANXIETY: ICD-10-CM

## 2025-03-14 RX ORDER — ALPRAZOLAM 0.25 MG
0.25 TABLET ORAL NIGHTLY PRN
Qty: 30 TABLET | Refills: 5 | Status: SHIPPED | OUTPATIENT
Start: 2025-03-14

## 2025-03-14 NOTE — PROGRESS NOTES
Subjective   Jeane Martines is a 69 y.o. female  Anxiety (Refill on alprazolam)      History of Present Illness  History of Present Illness  The patient is a 69-year-old female who presents today for follow-up on generalized anxiety disorder and foot pain.    She has been experiencing bilateral foot pain since the end of October 2024 or beginning of November 2024, which has significantly impacted her mobility. She has sought medical attention from a podiatrist, Dr. Ivan Hairston, who prescribed diclofenac, an oral anti-inflammatory medication, which she reports as beneficial. She has requested a second opinion from Dr. Ivan Conteh at [facility]. An MRI scan revealed a fracture in her right foot, for which she was advised to wear a boot for a duration of 6 weeks. She continues to wear the boot intermittently, excluding when she is driving. She also reports that the use of [employer]chers footwear has provided some relief. She expresses concern about the possibility of blood clots in her legs. The prolonged use of the boot has resulted in discomfort in her knee, hip, and back.    She is currently on citalopram and alprazolam, the latter of which she takes at night to aid sleep. She uses [facility] in [place].  She feels that her anxiety is currently well-controlled on medication and she is sleeping well with the assistance of her medications.  She denies any adverse effects from her current prescription medications.    She is overdue for her mammogram.    MEDICATIONS  Current: citalopram, alprazolam, diclofenac    The following portions of the patient's history were reviewed and updated as appropriate: allergies, current medications, past social history and problem list    Review of Systems   Constitutional:  Positive for activity change. Negative for appetite change and fatigue.   Respiratory:  Negative for chest tightness and shortness of breath.    Gastrointestinal:  Negative for abdominal pain, diarrhea  and nausea.   Musculoskeletal:  Positive for arthralgias, gait problem and myalgias. Negative for joint swelling.   Skin: Negative.    Neurological:  Negative for dizziness, tremors, weakness, light-headedness, numbness and headaches.   Psychiatric/Behavioral:  Positive for sleep disturbance. Negative for agitation, behavioral problems, confusion, decreased concentration, dysphoric mood and suicidal ideas. The patient is nervous/anxious.        Objective     Vitals:    03/14/25 1208   BP: 138/70   Pulse: 61   Temp: 98.9 °F (37.2 °C)   SpO2: 100%       Physical Exam  Vitals and nursing note reviewed.   Constitutional:       General: She is not in acute distress.     Appearance: Normal appearance. She is well-developed. She is not ill-appearing, toxic-appearing or diaphoretic.   Eyes:      Conjunctiva/sclera: Conjunctivae normal.   Cardiovascular:      Rate and Rhythm: Normal rate and regular rhythm.   Pulmonary:      Effort: Pulmonary effort is normal.      Breath sounds: Normal breath sounds.   Neurological:      Mental Status: She is alert and oriented to person, place, and time.      Coordination: Coordination normal.      Gait: Gait abnormal (slight limp).   Psychiatric:         Attention and Perception: She is attentive.         Mood and Affect: Mood normal.         Behavior: Behavior normal.         Thought Content: Thought content normal.         Judgment: Judgment normal.       Physical Exam      Assessment & Plan   Assessment & Plan  1. Generalized anxiety disorder.  She is currently taking citalopram and alprazolam (Xanax) at night to manage her symptoms. A prescription refill for Xanax will be provided to ensure she does not run out of her medication. She is advised to continue her current medication regimen.    2. Bilateral foot pain.  She has been experiencing bilateral foot pain since October or November. She has been using diclofenac oral medication, which helps manage the pain. An MRI revealed a  fracture in her right foot, for which she wore a boot for 6 weeks. She continues to wear the boot occasionally but reports it causes discomfort in her knee, hip, and back. A referral to Dr. Ivan Conteh at [facility] for a second opinion has been initiated. She is advised to schedule an appointment directly with them.    3. Health maintenance.  She is overdue for her mammogram. A referral for a mammogram at [facility] in [place] has been placed. She is advised to schedule the mammogram at her earliest convenience.    Diagnoses and all orders for this visit:    1. INO (generalized anxiety disorder) (Primary)    2. Bilateral foot pain  -     Ambulatory Referral to Orthopedic Surgery    3. Breast cancer screening by mammogram  -     Mammo Screening Digital Tomosynthesis Bilateral With CAD; Future    4. Encounter for therapeutic drug monitoring  -     Compliance Drug Analysis, Ur - Urine, Clean Catch; Future       Controlled substance agreement updated, urine drug screen obtained.  Electronic prescription will be submitted for a refill of Xanax at current dosage 0.25 mg nightly #30 with 5 refills per Dr. Garcia.  Follow-up in 6 months for recheck.    As part of this patient's treatment plan, patient will be prescribed controlled substances. The patient has been made aware of appropriate use of such medications, including potential risk of somnolence, limited ability to drive and /or work safely, and potential for dependence or overdose. It has also been made clear that these medications are for use by this patient only, without concomitant use of alcohol or other substances unless prescribed.Controlled substance status of medication discussed with patient, discussed risks of medication including abuse potential and diversion potential and need to follow up for reevaluation appointment in order to receive further refills.    Part of this note may be an electronic transcription/translation of spoken  language to printed text using the Dragon Dictation System.      Patient or patient representative verbalized consent for the use of Ambient Listening during the visit with  Vijaya Figueroa PA-C for chart documentation. 3/14/2025  16:22 EDT

## 2025-03-28 DIAGNOSIS — F41.9 ANXIETY: ICD-10-CM

## 2025-03-28 RX ORDER — FLUTICASONE PROPIONATE 50 MCG
2 SPRAY, SUSPENSION (ML) NASAL DAILY
Qty: 16 G | Refills: 5 | Status: SHIPPED | OUTPATIENT
Start: 2025-03-28

## 2025-03-28 RX ORDER — LORATADINE 10 MG/1
10 TABLET ORAL DAILY
Qty: 90 TABLET | Refills: 3 | Status: SHIPPED | OUTPATIENT
Start: 2025-03-28

## 2025-03-28 RX ORDER — CYANOCOBALAMIN 1000 UG/ML
INJECTION, SOLUTION INTRAMUSCULAR; SUBCUTANEOUS
Qty: 1 ML | Refills: 11 | Status: SHIPPED | OUTPATIENT
Start: 2025-03-28

## 2025-03-28 RX ORDER — METOPROLOL SUCCINATE 50 MG/1
50 TABLET, EXTENDED RELEASE ORAL DAILY
Qty: 90 TABLET | Refills: 1 | Status: SHIPPED | OUTPATIENT
Start: 2025-03-28

## 2025-03-28 NOTE — TELEPHONE ENCOUNTER
Caller: Jeane Martines    Relationship: Self    Best call back number: [phone]     Requested Prescriptions:   Requested Prescriptions     Pending Prescriptions Disp Refills    cyanocobalamin 1000 MCG/ML injection [Pharmacy Med Name: CYANOCOBALAMIN 1,000 MCG/ML MDV] 1 mL 11     Sig: ADMINISTER 1 MILLILITER INTRAMUSCULARLY ONCE MONTHLY FOR PERNICIOUS ANEMIA    metoprolol succinate XL (TOPROL-XL) 50 MG 24 hr tablet 90 tablet 1     Sig: Take 1 tablet by mouth Daily.    fluticasone (FLONASE) 50 MCG/ACT nasal spray       Sig: Administer 2 sprays into the nostril(s) as directed by provider Daily.    melatonin 5 MG tablet tablet       Sig: Take 1 tablet by mouth At Night As Needed.    loratadine (Claritin) 10 MG tablet 90 tablet 3     Sig: Take 1 tablet by mouth Daily. Take 1 tablet every day .        Pharmacy where request should be sent: [facility] [id number] [address] [phone] [facility] [phone]      Last office visit with prescribing clinician: 3/14/2025   Last telemedicine visit with prescribing clinician: Visit date not found   Next office visit with prescribing clinician: 10/27/2025     Additional details provided by patient: OUT OF MEDICATION     Does the patient have less than a 3 day supply:  [x] Yes  [] No    Would you like a call back once the refill request has been completed: [] Yes [x] No    If the office needs to give you a call back, can they leave a voicemail: [] Yes [x] No    Sumi Spencer Rep   03/28/25 15:01 EDT

## 2025-04-24 ENCOUNTER — HOSPITAL ENCOUNTER (OUTPATIENT)
Dept: MAMMOGRAPHY | Facility: HOSPITAL | Age: 70
Discharge: HOME | End: 2025-04-24
Admitting: PHYSICIAN ASSISTANT
Payer: MEDICARE

## 2025-04-24 DIAGNOSIS — Z12.31 BREAST CANCER SCREENING BY MAMMOGRAM: ICD-10-CM

## 2025-04-24 PROCEDURE — 77067 SCR MAMMO BI INCL CAD: CPT

## 2025-04-24 PROCEDURE — 77063 BREAST TOMOSYNTHESIS BI: CPT

## 2025-04-28 RX ORDER — NEEDLES, SAFETY 22GX1 1/2"
NEEDLE, DISPOSABLE MISCELLANEOUS
Qty: 3 EACH | Refills: 4 | Status: SHIPPED | OUTPATIENT
Start: 2025-04-28

## 2025-05-22 RX ORDER — LEVOTHYROXINE SODIUM 100 UG/1
100 TABLET ORAL
Qty: 90 TABLET | Refills: 3 | Status: SHIPPED | OUTPATIENT
Start: 2025-05-22